# Patient Record
Sex: FEMALE | Race: WHITE | Employment: UNEMPLOYED | URBAN - METROPOLITAN AREA
[De-identification: names, ages, dates, MRNs, and addresses within clinical notes are randomized per-mention and may not be internally consistent; named-entity substitution may affect disease eponyms.]

---

## 2021-01-07 ENCOUNTER — OFFICE VISIT (OUTPATIENT)
Dept: FAMILY MEDICINE CLINIC | Age: 71
End: 2021-01-07
Payer: MEDICARE

## 2021-01-07 VITALS
BODY MASS INDEX: 27.49 KG/M2 | SYSTOLIC BLOOD PRESSURE: 128 MMHG | HEIGHT: 65 IN | DIASTOLIC BLOOD PRESSURE: 78 MMHG | HEART RATE: 106 BPM | WEIGHT: 165 LBS | OXYGEN SATURATION: 98 % | TEMPERATURE: 97.6 F

## 2021-01-07 DIAGNOSIS — R63.4 UNINTENDED WEIGHT LOSS: ICD-10-CM

## 2021-01-07 DIAGNOSIS — R10.30 LOWER ABDOMINAL PAIN: Primary | ICD-10-CM

## 2021-01-07 DIAGNOSIS — K14.3 BROWN HAIRY TONGUE: ICD-10-CM

## 2021-01-07 DIAGNOSIS — R63.0 DECREASED APPETITE: ICD-10-CM

## 2021-01-07 PROCEDURE — 1123F ACP DISCUSS/DSCN MKR DOCD: CPT | Performed by: NURSE PRACTITIONER

## 2021-01-07 PROCEDURE — G8427 DOCREV CUR MEDS BY ELIG CLIN: HCPCS | Performed by: NURSE PRACTITIONER

## 2021-01-07 PROCEDURE — 1036F TOBACCO NON-USER: CPT | Performed by: NURSE PRACTITIONER

## 2021-01-07 PROCEDURE — 1090F PRES/ABSN URINE INCON ASSESS: CPT | Performed by: NURSE PRACTITIONER

## 2021-01-07 PROCEDURE — 4040F PNEUMOC VAC/ADMIN/RCVD: CPT | Performed by: NURSE PRACTITIONER

## 2021-01-07 PROCEDURE — 3017F COLORECTAL CA SCREEN DOC REV: CPT | Performed by: NURSE PRACTITIONER

## 2021-01-07 PROCEDURE — G8484 FLU IMMUNIZE NO ADMIN: HCPCS | Performed by: NURSE PRACTITIONER

## 2021-01-07 PROCEDURE — G8400 PT W/DXA NO RESULTS DOC: HCPCS | Performed by: NURSE PRACTITIONER

## 2021-01-07 PROCEDURE — 99204 OFFICE O/P NEW MOD 45 MIN: CPT | Performed by: NURSE PRACTITIONER

## 2021-01-07 PROCEDURE — G8417 CALC BMI ABV UP PARAM F/U: HCPCS | Performed by: NURSE PRACTITIONER

## 2021-01-07 RX ORDER — IBUPROFEN 200 MG
200 TABLET ORAL DAILY PRN
COMMUNITY
End: 2021-01-14

## 2021-01-07 RX ORDER — MULTIVIT WITH MINERALS/LUTEIN
500 TABLET ORAL DAILY
COMMUNITY

## 2021-01-07 RX ORDER — CYANOCOBALAMIN (VITAMIN B-12) 1000 MCG
2 TABLET, EXTENDED RELEASE ORAL DAILY
Status: ON HOLD | COMMUNITY
End: 2021-02-18 | Stop reason: HOSPADM

## 2021-01-07 SDOH — ECONOMIC STABILITY: TRANSPORTATION INSECURITY
IN THE PAST 12 MONTHS, HAS LACK OF TRANSPORTATION KEPT YOU FROM MEETINGS, WORK, OR FROM GETTING THINGS NEEDED FOR DAILY LIVING?: NO

## 2021-01-07 SDOH — ECONOMIC STABILITY: TRANSPORTATION INSECURITY
IN THE PAST 12 MONTHS, HAS THE LACK OF TRANSPORTATION KEPT YOU FROM MEDICAL APPOINTMENTS OR FROM GETTING MEDICATIONS?: NO

## 2021-01-07 SDOH — ECONOMIC STABILITY: INCOME INSECURITY: HOW HARD IS IT FOR YOU TO PAY FOR THE VERY BASICS LIKE FOOD, HOUSING, MEDICAL CARE, AND HEATING?: NOT ASKED

## 2021-01-07 ASSESSMENT — PATIENT HEALTH QUESTIONNAIRE - PHQ9
2. FEELING DOWN, DEPRESSED OR HOPELESS: 0
1. LITTLE INTEREST OR PLEASURE IN DOING THINGS: 0
SUM OF ALL RESPONSES TO PHQ QUESTIONS 1-9: 0

## 2021-01-07 ASSESSMENT — ENCOUNTER SYMPTOMS
NAUSEA: 0
VOMITING: 0
CONSTIPATION: 0
ABDOMINAL PAIN: 1
DIARRHEA: 0

## 2021-01-07 NOTE — PATIENT INSTRUCTIONS
Patient Education        Abdominal Pain: Care Instructions  Your Care Instructions     Abdominal pain has many possible causes. Some aren't serious and get better on their own in a few days. Others need more testing and treatment. If your pain continues or gets worse, you need to be rechecked and may need more tests to find out what is wrong. You may need surgery to correct the problem. Don't ignore new symptoms, such as fever, nausea and vomiting, urination problems, pain that gets worse, and dizziness. These may be signs of a more serious problem. Your doctor may have recommended a follow-up visit in the next 8 to 12 hours. If you are not getting better, you may need more tests or treatment. The doctor has checked you carefully, but problems can develop later. If you notice any problems or new symptoms, get medical treatment right away. Follow-up care is a key part of your treatment and safety. Be sure to make and go to all appointments, and call your doctor if you are having problems. It's also a good idea to know your test results and keep a list of the medicines you take. How can you care for yourself at home? · Rest until you feel better. · To prevent dehydration, drink plenty of fluids, enough so that your urine is light yellow or clear like water. Choose water and other caffeine-free clear liquids until you feel better. If you have kidney, heart, or liver disease and have to limit fluids, talk with your doctor before you increase the amount of fluids you drink. · If your stomach is upset, eat mild foods, such as rice, dry toast or crackers, bananas, and applesauce. Try eating several small meals instead of two or three large ones. · Wait until 48 hours after all symptoms have gone away before you have spicy foods, alcohol, and drinks that contain caffeine. · Do not eat foods that are high in fat. · Avoid anti-inflammatory medicines such as aspirin, ibuprofen (Advil, Motrin), and naproxen (Aleve). These can cause stomach upset. Talk to your doctor if you take daily aspirin for another health problem. When should you call for help? Call 911 anytime you think you may need emergency care. For example, call if:    · You passed out (lost consciousness).     · You pass maroon or very bloody stools.     · You vomit blood or what looks like coffee grounds.     · You have new, severe belly pain. Call your doctor now or seek immediate medical care if:    · Your pain gets worse, especially if it becomes focused in one area of your belly.     · You have a new or higher fever.     · Your stools are black and look like tar, or they have streaks of blood.     · You have unexpected vaginal bleeding.     · You have symptoms of a urinary tract infection. These may include:  ? Pain when you urinate. ? Urinating more often than usual.  ? Blood in your urine.     · You are dizzy or lightheaded, or you feel like you may faint. Watch closely for changes in your health, and be sure to contact your doctor if:    · You are not getting better after 1 day (24 hours). Where can you learn more? Go to https://WeDemand.[employer]. org and sign in to your Ezakus account. Enter D466 in the UpNext box to learn more about \"Abdominal Pain: Care Instructions. \"     If you do not have an account, please click on the \"Sign Up Now\" link. Current as of: June 26, 2019               Content Version: 12.6  © 9355-1579 [employer], [employer]. Care instructions adapted under license by [employer]. If you have questions about a medical condition or this instruction, always ask your healthcare professional. [place] [address] any warranty or liability for your use of this information.

## 2021-01-07 NOTE — PROGRESS NOTES
 Smokeless tobacco: Never Used   Substance and Sexual Activity    Alcohol use: Yes     Comment: occasional- socially    Drug use: Not on file    Sexual activity: Yes   Lifestyle    Physical activity     Days per week: Not on file     Minutes per session: Not on file    Stress: Not on file   Relationships    Social connections     Talks on phone: Not on file     Gets together: Not on file     Attends Caodaism service: Not on file     Active member of club or organization: Not on file     Attends meetings of clubs or organizations: Not on file     Relationship status: Not on file    Intimate partner violence     Fear of current or ex partner: Not on file     Emotionally abused: Not on file     Physically abused: Not on file     Forced sexual activity: Not on file   Other Topics Concern    Not on file   Social History Narrative    Not on file     Family History   Problem Relation Age of Onset    Breast Cancer Mother     Heart Disease Father     Atrial Fibrillation Brother      Allergies   Allergen Reactions    Aspirin Other (See Comments)     GI issue     Current Outpatient Medications   Medication Sig Dispense Refill    ibuprofen (ADVIL;MOTRIN) 200 MG tablet Take 200 mg by mouth daily as needed for Pain      calcium citrate-vitamin D (CITRICAL + D) 315-250 MG-UNIT TABS per tablet Take 2 tablets by mouth daily      Ascorbic Acid (VITAMIN C) 250 MG tablet Take 500 mg by mouth daily       No current facility-administered medications for this visit. Review of Systems   Constitutional: Positive for appetite change and unexpected weight change. Negative for chills, fatigue and fever. HENT: Negative for congestion, rhinorrhea, sinus pressure, sinus pain and sore throat. Respiratory: Negative for cough, shortness of breath and wheezing. Gastrointestinal: Positive for abdominal pain. Negative for constipation, diarrhea, nausea and vomiting. Genitourinary: Negative for difficulty urinating and pelvic pain. Musculoskeletal: Negative for myalgias. Skin: Negative for rash. Neurological: Negative for dizziness, light-headedness and headaches. Psychiatric/Behavioral: Negative for agitation, confusion and decreased concentration. The patient is not nervous/anxious. Objective:   /78 (Site: Right Upper Arm, Position: Sitting, Cuff Size: Large Adult)   Pulse 106   Temp 97.6 °F (36.4 °C) (Temporal)   Ht 5' 5\" (1.651 m)   Wt 165 lb (74.8 kg)   SpO2 98%   BMI 27.46 kg/m²     Physical Exam  Vitals signs reviewed. Constitutional:       General: She is not in acute distress. Appearance: Normal appearance. She is normal weight. She is not ill-appearing. HENT:      Head: Normocephalic and atraumatic. Right Ear: Hearing, ear canal and external ear normal. No middle ear effusion. No foreign body. Tympanic membrane is not injected, erythematous or bulging. Left Ear: Hearing, ear canal and external ear normal.  No middle ear effusion. No foreign body. Tympanic membrane is not injected, erythematous or bulging. Nose: Nose normal. No congestion or rhinorrhea. Right Nostril: No foreign body. Left Nostril: No foreign body. Right Turbinates: Not enlarged. Left Turbinates: Not enlarged. Right Sinus: No maxillary sinus tenderness or frontal sinus tenderness. Left Sinus: No maxillary sinus tenderness or frontal sinus tenderness. Mouth/Throat:      Lips: Pink. Mouth: Mucous membranes are moist.      Pharynx: Uvula midline. No pharyngeal swelling, oropharyngeal exudate, posterior oropharyngeal erythema or uvula swelling. Tonsils: No tonsillar exudate or tonsillar abscesses. Comments: She has a brown coat on her tongue, worse on the left side   Eyes:      General: Lids are normal.         Right eye: No foreign body. Left eye: No foreign body. Extraocular Movements: Extraocular movements intact. Conjunctiva/sclera: Conjunctivae normal.   Neck:      Musculoskeletal: Normal range of motion. Cardiovascular:      Rate and Rhythm: Normal rate and regular rhythm. Pulses: Normal pulses. Heart sounds: Normal heart sounds, S1 normal and S2 normal. No murmur. Comments: Apical  HR 92  Pulmonary:      Effort: Pulmonary effort is normal. No tachypnea, accessory muscle usage or respiratory distress. Breath sounds: Normal breath sounds. No wheezing or rhonchi. Abdominal:      General: Abdomen is flat. Bowel sounds are normal.      Palpations: Abdomen is soft. There is no mass. Tenderness: There is no abdominal tenderness. There is no guarding. Negative signs include Lopez's sign and McBurney's sign. Comments: There is no pain at this time of exam even on palpation    Musculoskeletal: Normal range of motion. Right lower leg: No edema. Left lower leg: No edema. Lymphadenopathy:      Cervical: No cervical adenopathy. Upper Body:      Right upper body: No supraclavicular adenopathy. Left upper body: No supraclavicular adenopathy. Skin:     General: Skin is warm and dry. Capillary Refill: Capillary refill takes less than 2 seconds. Neurological:      General: No focal deficit present. Mental Status: She is alert and oriented to person, place, and time. Cranial Nerves: Cranial nerves are intact. Gait: Gait is intact. Psychiatric:         Attention and Perception: Attention normal.         Mood and Affect: Mood normal.         Speech: Speech normal.         Behavior: Behavior normal. Behavior is cooperative. Thought Content: Thought content normal.         Judgment: Judgment normal.         Assessment:       Diagnosis Orders   1. Lower abdominal pain  XR ABDOMEN (2 VIEWS)   2. Decreased appetite     3. Unintended weight loss     4.  Brown hairy tongue No results found for this visit on 01/07/21. Plan:   Encouraged her to switch to tylenol instead of Motrin for now. Encouraged her to brush her tongue when she brushes her teeth and to use mouth wash. Explained the importance of establishing care to catch up on routine screenings and dig deeper into this. Her abd Xray came back that she does have moderate stool. She does admit to having hard stools sometimes, she doesn't go everyday. Discussed trying miralax daily to see if she feels any improvement. Then titrate to where she is having good BMs but not loose. She is going to follow up with PCP. Assessment & Plan   Gustavo Franco was seen today for abdominal pain and anorexia. Diagnoses and all orders for this visit:    Lower abdominal pain  -     XR ABDOMEN (2 VIEWS); Future    Decreased appetite    Unintended weight loss    Brown hairy tongue      Orders Placed This Encounter   Procedures    XR ABDOMEN (2 VIEWS)     Standing Status:   Future     Number of Occurrences:   1     Standing Expiration Date:   1/7/2022     Order Specific Question:   Reason for exam:     Answer:   abd pain for one month     No orders of the defined types were placed in this encounter. There are no discontinued medications. Return for Follow up with PCP-establish care ASAP. Reviewed with the patient/family: current clinical status & medications. Side effects of the medication prescribed today, as well as treatment plan/rationale and result expectations have been discussed with the patient/family who expresses understanding. Patient will be discharged home in stable condition. Follow up with PCP to evaluate treatment results or return if symptoms worsen or fail to improve. Discussed signs and symptoms which require immediate follow-up in ED/call to 911. Understanding verbalized. I have reviewed the patient's medical history in detail and updated the computerized patient record.     Vanita García, APRN - CNP

## 2021-01-10 ASSESSMENT — ENCOUNTER SYMPTOMS
SINUS PRESSURE: 0
SORE THROAT: 0
COUGH: 0
WHEEZING: 0
SHORTNESS OF BREATH: 0
RHINORRHEA: 0
SINUS PAIN: 0

## 2021-01-14 ENCOUNTER — OFFICE VISIT (OUTPATIENT)
Dept: FAMILY MEDICINE CLINIC | Age: 71
End: 2021-01-14
Payer: MEDICARE

## 2021-01-14 VITALS
DIASTOLIC BLOOD PRESSURE: 78 MMHG | SYSTOLIC BLOOD PRESSURE: 130 MMHG | BODY MASS INDEX: 26.66 KG/M2 | RESPIRATION RATE: 12 BRPM | OXYGEN SATURATION: 98 % | WEIGHT: 160 LBS | HEART RATE: 111 BPM | TEMPERATURE: 97.4 F | HEIGHT: 65 IN

## 2021-01-14 DIAGNOSIS — Z12.31 ENCOUNTER FOR SCREENING MAMMOGRAM FOR MALIGNANT NEOPLASM OF BREAST: ICD-10-CM

## 2021-01-14 DIAGNOSIS — Z11.59 NEED FOR HEPATITIS C SCREENING TEST: ICD-10-CM

## 2021-01-14 DIAGNOSIS — Z13.220 SCREENING, LIPID: ICD-10-CM

## 2021-01-14 DIAGNOSIS — R10.9 ABDOMINAL PAIN, UNSPECIFIED ABDOMINAL LOCATION: ICD-10-CM

## 2021-01-14 DIAGNOSIS — R10.9 ABDOMINAL PAIN, UNSPECIFIED ABDOMINAL LOCATION: Primary | ICD-10-CM

## 2021-01-14 DIAGNOSIS — Z12.11 COLON CANCER SCREENING: ICD-10-CM

## 2021-01-14 LAB
ALBUMIN SERPL-MCNC: 4.1 G/DL (ref 3.5–4.6)
ALP BLD-CCNC: 112 U/L (ref 40–130)
ALT SERPL-CCNC: 15 U/L (ref 0–33)
ANION GAP SERPL CALCULATED.3IONS-SCNC: 21 MEQ/L (ref 9–15)
AST SERPL-CCNC: 18 U/L (ref 0–35)
BILIRUB SERPL-MCNC: 0.6 MG/DL (ref 0.2–0.7)
BUN BLDV-MCNC: 17 MG/DL (ref 8–23)
CALCIUM SERPL-MCNC: 9.8 MG/DL (ref 8.5–9.9)
CHLORIDE BLD-SCNC: 97 MEQ/L (ref 95–107)
CHOLESTEROL, FASTING: 157 MG/DL (ref 0–199)
CO2: 25 MEQ/L (ref 20–31)
CREAT SERPL-MCNC: 0.72 MG/DL (ref 0.5–0.9)
GFR AFRICAN AMERICAN: >60
GFR NON-AFRICAN AMERICAN: >60
GLOBULIN: 3.3 G/DL (ref 2.3–3.5)
GLUCOSE BLD-MCNC: 126 MG/DL (ref 70–99)
HDLC SERPL-MCNC: 45 MG/DL (ref 40–59)
HEPATITIS C ANTIBODY INTERPRETATION: NORMAL
LDL CHOLESTEROL CALCULATED: 87 MG/DL (ref 0–129)
POTASSIUM SERPL-SCNC: 3.5 MEQ/L (ref 3.4–4.9)
SODIUM BLD-SCNC: 143 MEQ/L (ref 135–144)
TOTAL PROTEIN: 7.4 G/DL (ref 6.3–8)
TRIGLYCERIDE, FASTING: 123 MG/DL (ref 0–150)

## 2021-01-14 PROCEDURE — 1036F TOBACCO NON-USER: CPT | Performed by: FAMILY MEDICINE

## 2021-01-14 PROCEDURE — G8427 DOCREV CUR MEDS BY ELIG CLIN: HCPCS | Performed by: FAMILY MEDICINE

## 2021-01-14 PROCEDURE — G8484 FLU IMMUNIZE NO ADMIN: HCPCS | Performed by: FAMILY MEDICINE

## 2021-01-14 PROCEDURE — 1123F ACP DISCUSS/DSCN MKR DOCD: CPT | Performed by: FAMILY MEDICINE

## 2021-01-14 PROCEDURE — 3017F COLORECTAL CA SCREEN DOC REV: CPT | Performed by: FAMILY MEDICINE

## 2021-01-14 PROCEDURE — 4040F PNEUMOC VAC/ADMIN/RCVD: CPT | Performed by: FAMILY MEDICINE

## 2021-01-14 PROCEDURE — G8400 PT W/DXA NO RESULTS DOC: HCPCS | Performed by: FAMILY MEDICINE

## 2021-01-14 PROCEDURE — G8417 CALC BMI ABV UP PARAM F/U: HCPCS | Performed by: FAMILY MEDICINE

## 2021-01-14 PROCEDURE — 1090F PRES/ABSN URINE INCON ASSESS: CPT | Performed by: FAMILY MEDICINE

## 2021-01-14 PROCEDURE — 99203 OFFICE O/P NEW LOW 30 MIN: CPT | Performed by: FAMILY MEDICINE

## 2021-01-14 NOTE — PROGRESS NOTES
General appearance - alert, well appearing, and in no distress  Mental Status - alert, oriented to person, place, and time  Eyes - pupils equal and reactive, extraocular eye movements intact   Ears - bilateral TM's and external ear canals normal   Nose - normal and patent, no erythema, discharge or polyps   Sinuses - Normal paranasal sinuses without tenderness   Throat - mucous membranes moist, pharynx normal without lesions   Neck - supple, no significant adenopathy   Thyroid - thyroid is normal in size without nodules or tenderness    Chest - clear to auscultation, no wheezes, rales or rhonchi, symmetric air entry   Heart - normal rate, regular rhythm, normal S1, S2, no murmurs, rubs, clicks or gallops  Abdomen - soft, nontender, nondistended, no masses or organomegaly. Bowel sounds are active  Back exam - full range of motion, no tenderness, palpable spasm or pain on motion   Neurological - alert, oriented, normal speech, no focal findings or movement disorder noted   Musculoskeletal - no joint tenderness, deformity or swelling   Extremities - peripheral pulses normal, no pedal edema, no clubbing or cyanosis   Skin - normal coloration and turgor, no rashes, no suspicious skin lesions noted    Labs   No results found for: TSHREFLEX  No results found for: TSH        A/P: Clementina Lick 69 y.o. female presenting for     1. Abdominal pain, unspecified abdominal location  Resolved. Still having decreased appetite. Will monitor   - Comprehensive Metabolic Panel; Future    2. Encounter for screening mammogram for malignant neoplasm of breast    - JUANA DIGITAL SCREEN W OR WO CAD BILATERAL; Future    3. Screening, lipid    - Lipid, Fasting; Future    4. Need for hepatitis C screening test    - Hepatitis C Antibody; Future    5. Colon cancer screening    - Cologuard;  Future          Please note, this report has been partially produced using speech recognition software and may cause  and /or contain errors related to that system including grammar, punctuation and spelling as well as words and phrases that may seem inappropriate. If there are questions or concerns please feel free to contact me to clarify.

## 2021-01-14 NOTE — PATIENT INSTRUCTIONS
Patient Education        Panic Attacks: Care Instructions  Your Care Instructions     During a panic attack, you may have a feeling of intense fear or terror, trouble breathing, chest pain or tightness, heartbeat changes, dizziness, sweating, and shaking. A panic attack starts suddenly and usually lasts from 5 to 20 minutes but may last even longer. You have the most anxiety about 10 minutes after the attack starts. An attack can begin with a stressful event, or it can happen without a cause. Although panic attacks can cause scary symptoms, you can learn to manage them with self-care, counseling, and medicine. Follow-up care is a key part of your treatment and safety. Be sure to make and go to all appointments, and call your doctor if you are having problems. It's also a good idea to know your test results and keep a list of the medicines you take. How can you care for yourself at home? · Take your medicine exactly as directed. Call your doctor if you think you are having a problem with your medicine. · Go to your counseling sessions and follow-up appointments. · Recognize and accept your anxiety. Then, when you are in a situation that makes you anxious, say to yourself, \"This is not an emergency. I feel uncomfortable, but I am not in danger. I can keep going even if I feel anxious. \"  · Be kind to your body:  ? Relieve tension with exercise or a massage. ? Get enough rest.  ? Avoid alcohol, caffeine, nicotine, and illegal drugs. They can increase your anxiety level, cause sleep problems, or trigger a panic attack. ? Learn and do relaxation techniques. See below for more about these techniques. · Engage your mind. Get out and do something you enjoy. Go to a funny movie, or take a walk or hike. Plan your day. Having too much or too little to do can make you anxious. · Keep a record of your symptoms. Discuss your fears with a good friend or family member, or join a support group for people with similar problems. Talking to others sometimes relieves stress. · Get involved in social groups, or volunteer to help others. Being alone sometimes makes things seem worse than they are. · Get at least 30 minutes of exercise on most days of the week to relieve stress. Walking is a good choice. You also may want to do other activities, such as running, swimming, cycling, or playing tennis or team sports. Relaxation techniques  Do relaxation exercises for 10 to 20 minutes a day. You can play soothing, relaxing music while you do them, if you wish. · Tell others in your house that you are going to do your relaxation exercises. Ask them not to disturb you. · Find a comfortable place, away from all distractions and noise. · Lie down on your back, or sit with your back straight. · Focus on your breathing. Make it slow and steady. · Breathe in through your nose. Breathe out through either your nose or mouth. · Breathe deeply, filling up the area between your navel and your rib cage. Breathe so that your belly goes up and down. · Do not hold your breath. · Breathe like this for 5 to 10 minutes. Notice the feeling of calmness throughout your whole body. As you continue to breathe slowly and deeply, relax by doing the following for another 5 to 10 minutes:  · Tighten and relax each muscle group in your body. You can begin at your toes and work your way up to your head. · Imagine your muscle groups relaxing and becoming heavy. · Empty your mind of all thoughts. · Let yourself relax more and more deeply. · Become aware of the state of calmness that surrounds you. · When your relaxation time is over, you can bring yourself back to alertness by moving your fingers and toes and then your hands and feet and then stretching and moving your entire body. Sometimes people fall asleep during relaxation, but they usually wake up shortly afterward. · Always give yourself time to return to full alertness before you drive a car or do anything that might cause an accident if you are not fully alert. Never play a relaxation tape while driving a car. When should you call for help? Call 911 anytime you think you may need emergency care. For example, call if:    · You feel you cannot stop from hurting yourself or someone else. Watch closely for changes in your health, and be sure to contact your doctor if:    · Your panic attacks get worse.     · You have new or different anxiety.     · You are not getting better as expected. Where can you learn more? Go to https://Embibe.Ethonova. org and sign in to your SignStorey account. Enter H601 in the PreViser box to learn more about \"Panic Attacks: Care Instructions. \"     If you do not have an account, please click on the \"Sign Up Now\" link. Current as of: January 31, 2020               Content Version: 12.6  © 2006-2020 [employer]. Care instructions adapted under license by [facility] ([facility]). If you have questions about a medical condition or this instruction, always ask your healthcare professional. [place] [address] any warranty or liability for your use of this information.

## 2021-01-19 ENCOUNTER — OFFICE VISIT (OUTPATIENT)
Dept: FAMILY MEDICINE CLINIC | Age: 71
End: 2021-01-19
Payer: MEDICARE

## 2021-01-19 VITALS
OXYGEN SATURATION: 97 % | SYSTOLIC BLOOD PRESSURE: 138 MMHG | WEIGHT: 160.6 LBS | HEIGHT: 65 IN | BODY MASS INDEX: 26.76 KG/M2 | DIASTOLIC BLOOD PRESSURE: 90 MMHG | TEMPERATURE: 97.4 F | HEART RATE: 120 BPM

## 2021-01-19 DIAGNOSIS — F41.9 ANXIETY: ICD-10-CM

## 2021-01-19 DIAGNOSIS — R63.0 DECREASED APPETITE: Primary | ICD-10-CM

## 2021-01-19 DIAGNOSIS — R10.30 LOWER ABDOMINAL PAIN: ICD-10-CM

## 2021-01-19 PROCEDURE — 4040F PNEUMOC VAC/ADMIN/RCVD: CPT | Performed by: FAMILY MEDICINE

## 2021-01-19 PROCEDURE — 1090F PRES/ABSN URINE INCON ASSESS: CPT | Performed by: FAMILY MEDICINE

## 2021-01-19 PROCEDURE — 1036F TOBACCO NON-USER: CPT | Performed by: FAMILY MEDICINE

## 2021-01-19 PROCEDURE — G8417 CALC BMI ABV UP PARAM F/U: HCPCS | Performed by: FAMILY MEDICINE

## 2021-01-19 PROCEDURE — 1123F ACP DISCUSS/DSCN MKR DOCD: CPT | Performed by: FAMILY MEDICINE

## 2021-01-19 PROCEDURE — G8400 PT W/DXA NO RESULTS DOC: HCPCS | Performed by: FAMILY MEDICINE

## 2021-01-19 PROCEDURE — G8484 FLU IMMUNIZE NO ADMIN: HCPCS | Performed by: FAMILY MEDICINE

## 2021-01-19 PROCEDURE — 99214 OFFICE O/P EST MOD 30 MIN: CPT | Performed by: FAMILY MEDICINE

## 2021-01-19 PROCEDURE — 3017F COLORECTAL CA SCREEN DOC REV: CPT | Performed by: FAMILY MEDICINE

## 2021-01-19 PROCEDURE — G8427 DOCREV CUR MEDS BY ELIG CLIN: HCPCS | Performed by: FAMILY MEDICINE

## 2021-01-19 RX ORDER — MIRTAZAPINE 7.5 MG/1
7.5 TABLET, FILM COATED ORAL NIGHTLY
Qty: 30 TABLET | Refills: 0 | Status: SHIPPED | OUTPATIENT
Start: 2021-01-19 | End: 2021-02-01

## 2021-01-19 ASSESSMENT — PATIENT HEALTH QUESTIONNAIRE - PHQ9
SUM OF ALL RESPONSES TO PHQ QUESTIONS 1-9: 1
2. FEELING DOWN, DEPRESSED OR HOPELESS: 1
1. LITTLE INTEREST OR PLEASURE IN DOING THINGS: 0
SUM OF ALL RESPONSES TO PHQ QUESTIONS 1-9: 1
SUM OF ALL RESPONSES TO PHQ9 QUESTIONS 1 & 2: 1
SUM OF ALL RESPONSES TO PHQ QUESTIONS 1-9: 1

## 2021-01-19 NOTE — PROGRESS NOTES
Chief Complaint   Patient presents with    Anorexia     States since she started with the abd pain about 2 weeks ago she has just not had an appetite. States her spouse will make her something & she will maybe take a couple of bites & other times she may not touch it. Denies any vomiting. States she will have like an acid reflux thing & will start coughing & will dry heave. Denies any SOB, chest pain or heart palpitations. Denies any constipation or diarrhea. Abdominal has gone, no longer having. HPI: Sivakumar Kevin 69 y.o. female presenting for    [facility] [place] care     Abdominal Pain   Patient was having lower abdominal pain. Denies any nausea or vomiting. Was associated with some constipation. Patient was given miralax and has been having regular bowel movement. Denies any blood in the stools. Patient currently has a soft diet. f/u  Denies having any more stomach. Abdominal pain is gone . Patient denies any changes in her abdomen. Patient has some ups and down. Denies any nausea or constipation. Patient reports that with her anxiety she gets acid reflux. pateint was taking tums  And it made it worse. Patient has a fear of eating at this time. Has fear that it will reproduce her pain. Panic Attacks   Admits still having panic attacks   Patinet reports with crowds it increases. Current Outpatient Medications   Medication Sig Dispense Refill    mirtazapine (REMERON) 7.5 MG tablet Take 1 tablet by mouth nightly 30 tablet 0    calcium citrate-vitamin D (CITRICAL + D) 315-250 MG-UNIT TABS per tablet Take 2 tablets by mouth daily      Ascorbic Acid (VITAMIN C) 250 MG tablet Take 500 mg by mouth daily       No current facility-administered medications for this visit. ROS  CONSTITUTIONAL: The patient denies fevers, chills, sweats and body ache. HEENT: Denies headache, blurry vision, eye pain, tinnitus, vertigo,  sore throat, neck or thyroid masses. RESPIRATORY: Denies cough, sputum, hemoptysis. CARDIAC: Denies chest pain, pressure, palpitations, Denies lower extremity edema. GASTROINTESTINAL: Denies abdominal pain (resolved), constipation, diarrhea, bleeding in the stools,   GENITOURINARY: Denies dysuria, hematuria, nocturia or frequency, urinary incontinence. NEUROLOGIC: Denies headaches, dizziness, syncope, weakness  MUSCULOSKELETAL: denies changes in range of motion, joint pain, stiffness. ENDOCRINOLOGY: Denies heat or cold intolerance. HEMATOLOGY: Denies easy bleeding or blood transfusion,anemia  DERMATOLOGY: Denies changes in moles or pigmentation changes. PSYCHIATRY: Denies depression, agitation or anxiety.     Past Medical History:   Diagnosis Date    Anxiety     panic attacks    Fibroids 1998    has complete hysterctomy    Kidney stone     14 years ago        Past Surgical History:   Procedure Laterality Date    HYSTERECTOMY, TOTAL ABDOMINAL          Family History   Problem Relation Age of Onset    Breast Cancer Mother     Heart Disease Father     Atrial Fibrillation Brother         Social History     Socioeconomic History    Marital status:      Spouse name: Not on file    Number of children: Not on file    Years of education: Not on file    Highest education level: Not on file   Occupational History    Not on file   Social Needs    Financial resource strain: Not on file    Food insecurity     Worry: Never true     Inability: Never true    Transportation needs     Medical: No     Non-medical: No   Tobacco Use    Smoking status: Never Smoker    Smokeless tobacco: Never Used   Substance and Sexual Activity    Alcohol use: Yes     Comment: occasional- socially    Drug use: Never    Sexual activity: Yes   Lifestyle    Physical activity     Days per week: Not on file     Minutes per session: Not on file    Stress: Not on file   Relationships    Social connections     Talks on phone: Not on file Gets together: Not on file     Attends Buddhist service: Not on file     Active member of club or organization: Not on file     Attends meetings of clubs or organizations: Not on file     Relationship status: Not on file    Intimate partner violence     Fear of current or ex partner: Not on file     Emotionally abused: Not on file     Physically abused: Not on file     Forced sexual activity: Not on file   Other Topics Concern    Not on file   Social History Narrative    Retired- worked with  in law firm     Hobbies- Genology     3 [place]. [place] Others in [address] [facility] up in [address]. BP (!) 138/90   Pulse 120   Temp 97.4 °F (36.3 °C) (Oral)   Ht 5' 5\" (1.651 m)   Wt 160 lb 9.6 oz (72.8 kg)   SpO2 97%   BMI 26.73 kg/m²        Physical Exam:    General appearance - alert, well appearing, and in no distress  Mental Status - alert, oriented to person, place, and time  Eyes - pupils equal and reactive, extraocular eye movements intact   Ears - bilateral TM's and external ear canals normal   Nose - normal and patent, no erythema, discharge or polyps   Sinuses - Normal paranasal sinuses without tenderness   Throat - mucous membranes moist, pharynx normal without lesions   Neck - supple, no significant adenopathy   Thyroid - thyroid is normal in size without nodules or tenderness    Chest - clear to auscultation, no wheezes, rales or rhonchi, symmetric air entry   Heart - normal rate, regular rhythm, normal S1, S2, no murmurs, rubs, clicks or gallops  Abdomen - soft, nontender, nondistended, no masses or organomegaly.  Bowel sounds are active  Back exam - full range of motion, no tenderness, palpable spasm or pain on motion   Neurological - alert, oriented, normal speech, no focal findings or movement disorder noted   Musculoskeletal - no joint tenderness, deformity or swelling   Extremities - peripheral pulses normal, no pedal edema, no clubbing or cyanosis Skin - normal coloration and turgor, no rashes, no suspicious skin lesions noted    Labs   No results found for: TSHREFLEX  No results found for: TSH    Lab Results   Component Value Date     01/14/2021    K 3.5 01/14/2021    CL 97 01/14/2021    CO2 25 01/14/2021    BUN 17 01/14/2021    CREATININE 0.72 01/14/2021    GLUCOSE 126 (H) 01/14/2021    CALCIUM 9.8 01/14/2021    PROT 7.4 01/14/2021    LABALBU 4.1 01/14/2021    BILITOT 0.6 01/14/2021    ALKPHOS 112 01/14/2021    AST 18 01/14/2021    ALT 15 01/14/2021    LABGLOM >60.0 01/14/2021    GFRAA >60.0 01/14/2021    GLOB 3.3 01/14/2021       No results found for: WBC, HGB, HCT, MCV, PLT  No results found for: LABA1C  No results found for: EAG      A/P: Roel Quezada 69 y.o. female presenting for     1. Decreased appetite  Decreased appetite despite improvement in abdominal pain. I wonder if this is secondary to anxiety. Will start Remeron to see if this can help with her symptoms. We will continue to monitor and have patient follow-up in 2 weeks. 2. Lower abdominal pain      3. Anxiety  We will start Remeron to help with her symptoms. We will have patient follow-up in 2 weeks. Please note, this report has been partially produced using speech recognition software  and may cause  and /or contain errors related to that system including grammar, punctuation and spelling as well as words and phrases that may seem inappropriate. If there are questions or concerns please feel free to contact me to clarify.

## 2021-02-01 ENCOUNTER — VIRTUAL VISIT (OUTPATIENT)
Dept: FAMILY MEDICINE CLINIC | Age: 71
End: 2021-02-01
Payer: MEDICARE

## 2021-02-01 ENCOUNTER — HOSPITAL ENCOUNTER (INPATIENT)
Age: 71
LOS: 17 days | Discharge: HOSPICE | DRG: 981 | End: 2021-02-18
Attending: EMERGENCY MEDICINE | Admitting: INTERNAL MEDICINE
Payer: MEDICARE

## 2021-02-01 ENCOUNTER — APPOINTMENT (OUTPATIENT)
Dept: CT IMAGING | Age: 71
DRG: 981 | End: 2021-02-01
Payer: MEDICARE

## 2021-02-01 DIAGNOSIS — E86.0 DEHYDRATION: ICD-10-CM

## 2021-02-01 DIAGNOSIS — R53.83 FATIGUE, UNSPECIFIED TYPE: Primary | ICD-10-CM

## 2021-02-01 DIAGNOSIS — R11.11 VOMITING WITHOUT NAUSEA, INTRACTABILITY OF VOMITING NOT SPECIFIED, UNSPECIFIED VOMITING TYPE: Primary | ICD-10-CM

## 2021-02-01 DIAGNOSIS — R19.8 GAGGING EPISODE: ICD-10-CM

## 2021-02-01 DIAGNOSIS — R18.0 MALIGNANT ASCITES: ICD-10-CM

## 2021-02-01 DIAGNOSIS — R11.2 INTRACTABLE VOMITING WITH NAUSEA, UNSPECIFIED VOMITING TYPE: ICD-10-CM

## 2021-02-01 LAB
ALBUMIN SERPL-MCNC: 3.8 G/DL (ref 3.5–4.6)
ALP BLD-CCNC: 193 U/L (ref 40–130)
ALT SERPL-CCNC: 49 U/L (ref 0–33)
ANION GAP SERPL CALCULATED.3IONS-SCNC: 16 MEQ/L (ref 9–15)
AST SERPL-CCNC: 53 U/L (ref 0–35)
BASOPHILS ABSOLUTE: 0 K/UL (ref 0–0.2)
BASOPHILS RELATIVE PERCENT: 0.8 %
BILIRUB SERPL-MCNC: 0.9 MG/DL (ref 0.2–0.7)
BUN BLDV-MCNC: 25 MG/DL (ref 8–23)
CALCIUM SERPL-MCNC: 9.6 MG/DL (ref 8.5–9.9)
CHLORIDE BLD-SCNC: 90 MEQ/L (ref 95–107)
CO2: 28 MEQ/L (ref 20–31)
CREAT SERPL-MCNC: 1.05 MG/DL (ref 0.5–0.9)
EOSINOPHILS ABSOLUTE: 0.1 K/UL (ref 0–0.7)
EOSINOPHILS RELATIVE PERCENT: 1 %
GFR AFRICAN AMERICAN: >60
GFR NON-AFRICAN AMERICAN: 51.5
GLOBULIN: 3.8 G/DL (ref 2.3–3.5)
GLUCOSE BLD-MCNC: 191 MG/DL (ref 70–99)
HCT VFR BLD CALC: 49.4 % (ref 37–47)
HEMOGLOBIN: 16.4 G/DL (ref 12–16)
INR BLD: 1
LACTIC ACID: 3.4 MMOL/L (ref 0.5–2.2)
LIPASE: 59 U/L (ref 12–95)
LYMPHOCYTES ABSOLUTE: 1.9 K/UL (ref 1–4.8)
LYMPHOCYTES RELATIVE PERCENT: 14 %
MCH RBC QN AUTO: 29.2 PG (ref 27–31.3)
MCHC RBC AUTO-ENTMCNC: 33.2 % (ref 33–37)
MCV RBC AUTO: 88.1 FL (ref 82–100)
METAMYELOCYTES RELATIVE PERCENT: 1 %
MONOCYTES ABSOLUTE: 1.4 K/UL (ref 0.2–0.8)
MONOCYTES RELATIVE PERCENT: 9.6 %
NEUTROPHILS ABSOLUTE: 10.4 K/UL (ref 1.4–6.5)
NEUTROPHILS RELATIVE PERCENT: 74 %
PDW BLD-RTO: 12.8 % (ref 11.5–14.5)
PLATELET # BLD: 469 K/UL (ref 130–400)
PLATELET SLIDE REVIEW: ABNORMAL
POC CREATININE WHOLE BLOOD: 1.1
POTASSIUM SERPL-SCNC: 5.5 MEQ/L (ref 3.4–4.9)
PROTHROMBIN TIME: 13.3 SEC (ref 12.3–14.9)
RBC # BLD: 5.61 M/UL (ref 4.2–5.4)
RBC # BLD: NORMAL 10*6/UL
SARS-COV-2, NAAT: NOT DETECTED
SODIUM BLD-SCNC: 134 MEQ/L (ref 135–144)
TOTAL PROTEIN: 7.6 G/DL (ref 6.3–8)
WBC # BLD: 13.9 K/UL (ref 4.8–10.8)

## 2021-02-01 PROCEDURE — 80053 COMPREHEN METABOLIC PANEL: CPT

## 2021-02-01 PROCEDURE — 36415 COLL VENOUS BLD VENIPUNCTURE: CPT

## 2021-02-01 PROCEDURE — 6360000004 HC RX CONTRAST MEDICATION: Performed by: EMERGENCY MEDICINE

## 2021-02-01 PROCEDURE — 2580000003 HC RX 258: Performed by: INTERNAL MEDICINE

## 2021-02-01 PROCEDURE — 85610 PROTHROMBIN TIME: CPT

## 2021-02-01 PROCEDURE — 6360000002 HC RX W HCPCS: Performed by: EMERGENCY MEDICINE

## 2021-02-01 PROCEDURE — 1210000000 HC MED SURG R&B

## 2021-02-01 PROCEDURE — 81003 URINALYSIS AUTO W/O SCOPE: CPT

## 2021-02-01 PROCEDURE — 83605 ASSAY OF LACTIC ACID: CPT

## 2021-02-01 PROCEDURE — 6360000002 HC RX W HCPCS: Performed by: INTERNAL MEDICINE

## 2021-02-01 PROCEDURE — 6370000000 HC RX 637 (ALT 250 FOR IP): Performed by: INTERNAL MEDICINE

## 2021-02-01 PROCEDURE — 2580000003 HC RX 258: Performed by: EMERGENCY MEDICINE

## 2021-02-01 PROCEDURE — 74177 CT ABD & PELVIS W/CONTRAST: CPT

## 2021-02-01 PROCEDURE — 83690 ASSAY OF LIPASE: CPT

## 2021-02-01 PROCEDURE — U0002 COVID-19 LAB TEST NON-CDC: HCPCS

## 2021-02-01 PROCEDURE — 85025 COMPLETE CBC W/AUTO DIFF WBC: CPT

## 2021-02-01 PROCEDURE — 96374 THER/PROPH/DIAG INJ IV PUSH: CPT

## 2021-02-01 PROCEDURE — 96361 HYDRATE IV INFUSION ADD-ON: CPT

## 2021-02-01 PROCEDURE — 99442 PR PHYS/QHP TELEPHONE EVALUATION 11-20 MIN: CPT | Performed by: FAMILY MEDICINE

## 2021-02-01 PROCEDURE — 99284 EMERGENCY DEPT VISIT MOD MDM: CPT

## 2021-02-01 RX ORDER — ONDANSETRON 2 MG/ML
4 INJECTION INTRAMUSCULAR; INTRAVENOUS ONCE
Status: COMPLETED | OUTPATIENT
Start: 2021-02-01 | End: 2021-02-01

## 2021-02-01 RX ORDER — 0.9 % SODIUM CHLORIDE 0.9 %
1000 INTRAVENOUS SOLUTION INTRAVENOUS ONCE
Status: COMPLETED | OUTPATIENT
Start: 2021-02-01 | End: 2021-02-01

## 2021-02-01 RX ORDER — PROMETHAZINE HYDROCHLORIDE 12.5 MG/1
12.5 TABLET ORAL EVERY 6 HOURS PRN
Status: DISCONTINUED | OUTPATIENT
Start: 2021-02-01 | End: 2021-02-18 | Stop reason: HOSPADM

## 2021-02-01 RX ORDER — ONDANSETRON 4 MG/1
4 TABLET, FILM COATED ORAL EVERY 12 HOURS PRN
Qty: 30 TABLET | Refills: 0 | Status: SHIPPED | OUTPATIENT
Start: 2021-02-01

## 2021-02-01 RX ORDER — SODIUM CHLORIDE 0.9 % (FLUSH) 0.9 %
10 SYRINGE (ML) INJECTION EVERY 12 HOURS SCHEDULED
Status: DISCONTINUED | OUTPATIENT
Start: 2021-02-01 | End: 2021-02-18 | Stop reason: HOSPADM

## 2021-02-01 RX ORDER — SENNA PLUS 8.6 MG/1
2 TABLET ORAL NIGHTLY
Status: DISCONTINUED | OUTPATIENT
Start: 2021-02-01 | End: 2021-02-18 | Stop reason: HOSPADM

## 2021-02-01 RX ORDER — ONDANSETRON 2 MG/ML
4 INJECTION INTRAMUSCULAR; INTRAVENOUS EVERY 6 HOURS PRN
Status: DISCONTINUED | OUTPATIENT
Start: 2021-02-01 | End: 2021-02-18 | Stop reason: HOSPADM

## 2021-02-01 RX ORDER — ACETAMINOPHEN 650 MG/1
650 SUPPOSITORY RECTAL EVERY 6 HOURS PRN
Status: DISCONTINUED | OUTPATIENT
Start: 2021-02-01 | End: 2021-02-18 | Stop reason: HOSPADM

## 2021-02-01 RX ORDER — ACETAMINOPHEN 325 MG/1
650 TABLET ORAL EVERY 6 HOURS PRN
Status: DISCONTINUED | OUTPATIENT
Start: 2021-02-01 | End: 2021-02-18 | Stop reason: HOSPADM

## 2021-02-01 RX ORDER — ONDANSETRON 4 MG/1
4 TABLET, FILM COATED ORAL EVERY 12 HOURS PRN
Qty: 30 TABLET | Refills: 0 | Status: SHIPPED | OUTPATIENT
Start: 2021-02-01 | End: 2021-02-01

## 2021-02-01 RX ORDER — SODIUM CHLORIDE 0.9 % (FLUSH) 0.9 %
10 SYRINGE (ML) INJECTION ONCE
Status: DISCONTINUED | OUTPATIENT
Start: 2021-02-01 | End: 2021-02-08 | Stop reason: HOSPADM

## 2021-02-01 RX ORDER — DOCUSATE SODIUM 100 MG/1
100 CAPSULE, LIQUID FILLED ORAL 2 TIMES DAILY
Status: DISCONTINUED | OUTPATIENT
Start: 2021-02-01 | End: 2021-02-09

## 2021-02-01 RX ORDER — SODIUM CHLORIDE 0.9 % (FLUSH) 0.9 %
10 SYRINGE (ML) INJECTION PRN
Status: DISCONTINUED | OUTPATIENT
Start: 2021-02-01 | End: 2021-02-18 | Stop reason: HOSPADM

## 2021-02-01 RX ORDER — BISACODYL 10 MG
10 SUPPOSITORY, RECTAL RECTAL DAILY
Status: DISCONTINUED | OUTPATIENT
Start: 2021-02-01 | End: 2021-02-09

## 2021-02-01 RX ADMIN — ENOXAPARIN SODIUM 40 MG: 40 INJECTION SUBCUTANEOUS at 20:53

## 2021-02-01 RX ADMIN — SENNOSIDES 17.2 MG: 8.6 TABLET, FILM COATED ORAL at 22:27

## 2021-02-01 RX ADMIN — Medication 10 ML: at 20:53

## 2021-02-01 RX ADMIN — ONDANSETRON 4 MG: 2 INJECTION INTRAMUSCULAR; INTRAVENOUS at 13:50

## 2021-02-01 RX ADMIN — IOPAMIDOL 100 ML: 612 INJECTION, SOLUTION INTRAVENOUS at 15:51

## 2021-02-01 RX ADMIN — SODIUM CHLORIDE 1000 ML: 9 INJECTION, SOLUTION INTRAVENOUS at 14:24

## 2021-02-01 RX ADMIN — DOCUSATE SODIUM 100 MG: 100 CAPSULE ORAL at 22:27

## 2021-02-01 RX ADMIN — SODIUM CHLORIDE 1000 ML: 9 INJECTION, SOLUTION INTRAVENOUS at 13:50

## 2021-02-01 RX ADMIN — ONDANSETRON 4 MG: 2 INJECTION INTRAMUSCULAR; INTRAVENOUS at 20:53

## 2021-02-01 ASSESSMENT — ENCOUNTER SYMPTOMS
NAUSEA: 1
ABDOMINAL PAIN: 0
CHEST TIGHTNESS: 0
ABDOMINAL PAIN: 0
STRIDOR: 0
EYE PAIN: 0
EYE ITCHING: 0
DIARRHEA: 0
SHORTNESS OF BREATH: 0
SORE THROAT: 0
APNEA: 0
VOMITING: 1
ABDOMINAL DISTENTION: 0
ABDOMINAL DISTENTION: 1
EYE DISCHARGE: 0
RHINORRHEA: 0
SHORTNESS OF BREATH: 0
SINUS PRESSURE: 0
CONSTIPATION: 1
CHEST TIGHTNESS: 0

## 2021-02-01 NOTE — ED PROVIDER NOTES
Except as noted above the remainder of the review of systems was reviewed and negative.        PAST MEDICAL HISTORY     Past Medical History:   Diagnosis Date    Anxiety     panic attacks    Fibroids 1998    has complete hysterctomy    Kidney stone     14 years ago         SURGICAL HISTORY       Past Surgical History:   Procedure Laterality Date    HYSTERECTOMY, TOTAL ABDOMINAL           CURRENT MEDICATIONS       Previous Medications    ASCORBIC ACID (VITAMIN C) 250 MG TABLET    Take 500 mg by mouth daily    CALCIUM CITRATE-VITAMIN D (CITRICAL + D) 315-250 MG-UNIT TABS PER TABLET    Take 2 tablets by mouth daily    ONDANSETRON (ZOFRAN) 4 MG TABLET    Take 1 tablet by mouth every 12 hours as needed for Nausea or Vomiting       ALLERGIES     Aspirin    FAMILY HISTORY       Family History   Problem Relation Age of Onset    Breast Cancer Mother     Heart Disease Father     Atrial Fibrillation Brother           SOCIAL HISTORY       Social History     Socioeconomic History    Marital status:      Spouse name: None    Number of children: None    Years of education: None    Highest education level: None   Occupational History    None   Social Needs    Financial resource strain: None    Food insecurity     Worry: Never true     Inability: Never true    Transportation needs     Medical: No     Non-medical: No   Tobacco Use    Smoking status: Never Smoker    Smokeless tobacco: Never Used   Substance and Sexual Activity    Alcohol use: Yes     Comment: occasional- socially    Drug use: Never    Sexual activity: Yes   Lifestyle    Physical activity     Days per week: None     Minutes per session: None    Stress: None   Relationships    Social connections     Talks on phone: None     Gets together: None     Attends Shinto service: None     Active member of club or organization: None     Attends meetings of clubs or organizations: None     Relationship status: None    Intimate partner violence Fear of current or ex partner: None     Emotionally abused: None     Physically abused: None     Forced sexual activity: None   Other Topics Concern    None   Social History Narrative    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 [place] in [place] Others in [address]. SCREENINGS        Vida Coma Scale  Eye Opening: Spontaneous  Best Verbal Response: Oriented  Best Motor Response: Obeys commands  Welcome Coma Scale Score: 15               PHYSICAL EXAM    (up to 7 for level 4, 8 or more for level 5)     ED Triage Vitals [02/01/21 1317]   BP Temp Temp src Pulse Resp SpO2 Height Weight   (!) 129/99 98.3 °F (36.8 °C) -- 119 19 98 % 5' 5\" (1.651 m) 160 lb (72.6 kg)       Physical Exam  Vitals signs and nursing note reviewed. Constitutional:       General: She is not in acute distress. Appearance: She is well-developed. She is not diaphoretic. HENT:      Head: Normocephalic and atraumatic. Right Ear: External ear normal.      Left Ear: External ear normal.      Mouth/Throat:      Pharynx: No oropharyngeal exudate. Eyes:      Conjunctiva/sclera: Conjunctivae normal.      Pupils: Pupils are equal, round, and reactive to light. Neck:      Musculoskeletal: Normal range of motion and neck supple. Thyroid: No thyromegaly. Vascular: No JVD. Trachea: No tracheal deviation. Cardiovascular:      Rate and Rhythm: Normal rate. Heart sounds: Normal heart sounds. No murmur. Pulmonary:      Effort: Pulmonary effort is normal. No respiratory distress. Breath sounds: Normal breath sounds. No wheezing. Abdominal:      General: Bowel sounds are normal. There is distension. Palpations: Abdomen is soft. Tenderness: There is no abdominal tenderness. There is no guarding. Comments: Abdomen firm and distended. Likely ascites. Musculoskeletal: Normal range of motion. Skin:     General: Skin is warm and dry. Coloration: Skin is pale. Findings: No rash. Neurological:      Mental Status: She is alert and oriented to person, place, and time. Cranial Nerves: No cranial nerve deficit. Psychiatric:         Behavior: Behavior normal.         DIAGNOSTIC RESULTS     EKG: All EKG's are interpreted by the Emergency Department Physician who either signs or Co-signs this chart in the absence of a cardiologist.        RADIOLOGY:   Non-plain film images such as CT, Ultrasound and MRI are read by the radiologist. Plain radiographic images are visualized and preliminarily interpreted by the emergency physician with the below findings:        Interpretation per the Radiologist below, if available at the time of this note:    CT ABDOMEN PELVIS W IV CONTRAST Additional Contrast? None    (Results Pending)     Thickening and nodularity of the omentum concerning for peritoneal metastasis or peritonitis. A large amount of ascites is present.       Two ill-defined lesions within the right lobe of the liver measuring 1 cm and 2 cm respectively are nonspecific but most concerning for metastatic lesions.       Mild perivesicular inflammation. Correlation with urinalysis recommended to exclude cystitis.       Large amount stool within the rectosigmoid colon may represent constipation and/or fecal impaction.       Subtle nodularity of the liver suggests cirrhosis.       Mild right-sided hydronephrosis without radiopaque calculus.       Debris within the gallbladder likely represents gallstones and/or gallbladder sludge.       Small right pleural effusion.            ED BEDSIDE ULTRASOUND:   Performed by ED Physician - none    LABS:  Labs Reviewed   CBC WITH AUTO DIFFERENTIAL - Abnormal; Notable for the following components:       Result Value    WBC 13.9 (*)     RBC 5.61 (*)     Hemoglobin 16.4 (*)     Hematocrit 49.4 (*)     Platelets 754 (*)     Neutrophils Absolute 10.4 (*)     Monocytes Absolute 1.4 (*) All other components within normal limits   COMPREHENSIVE METABOLIC PANEL - Abnormal; Notable for the following components:    Sodium 134 (*)     Potassium 5.5 (*)     Chloride 90 (*)     Anion Gap 16 (*)     Glucose 191 (*)     BUN 25 (*)     CREATININE 1.05 (*)     GFR Non- 51.5 (*)     Total Bilirubin 0.9 (*)     Alkaline Phosphatase 193 (*)     ALT 49 (*)     AST 53 (*)     Globulin 3.8 (*)     All other components within normal limits   LACTIC ACID, PLASMA - Abnormal; Notable for the following components:    Lactic Acid 3.4 (*)     All other components within normal limits    Narrative:     CALL  [facility]  [facility] tel. [phone],  Lactic Acid results called to and read back by Caleb HOGAN RN,  02/01/2021 14:22, by Joe Moreno 92 - Normal   LIPASE   PROTIME-INR   URINE RT REFLEX TO CULTURE       All other labs were within normal range or not returned as of this dictation. EMERGENCY DEPARTMENT COURSE and DIFFERENTIAL DIAGNOSIS/MDM:   Vitals:    Vitals:    02/01/21 1317   BP: (!) 129/99   Pulse: 119   Resp: 19   Temp: 98.3 °F (36.8 °C)   SpO2: 98%   Weight: 160 lb (72.6 kg)   Height: 5' 5\" (1.651 m)       Patient came in with intractable nausea and vomiting. Fatigue and weight loss. CT shows ascites. Possible nodularity of the omentum which could be consistent with peritoneal metastasis. Patient received 2 L of fluid and Zofran. Her nausea is gone. The patient is able to keep fluids down I believe she could be sent home and follow-up with interventional radiology. She will need a paracentesis and have the fluid tested. MDM      REASSESSMENT          CRITICAL CARE TIME   Total Critical Care time was 0 minutes, excluding separately reportable procedures. There was a high probability of clinically significant/life threatening deterioration in the patient's condition which required my urgent intervention.       CONSULTS:  None    PROCEDURES: Unless otherwise noted below, none     Procedures        FINAL IMPRESSION      1. Fatigue, unspecified type    2. Dehydration    3. Intractable vomiting with nausea, unspecified vomiting type    4. Malignant ascites          DISPOSITION/PLAN   DISPOSITION  Admit      PATIENT REFERRED TO:  No follow-up provider specified. DISCHARGE MEDICATIONS:  New Prescriptions    No medications on file     Controlled Substances Monitoring:     No flowsheet data found.     (Please note that portions of this note were completed with a voice recognition program.  Efforts were made to edit the dictations but occasionally words are mis-transcribed.)    Lizette Vasquez DO (electronically signed)  Attending Emergency Physician           Lizette Vasquez DO  02/01/21 [facility] 99, DO  02/01/21 9813

## 2021-02-01 NOTE — PROGRESS NOTES
2021    TELEHEALTH EVALUATION -- Audio/Visual (During OFUDP-86 public health emergency)    Due to COVID 19 outbreak, patient's office visit was converted to a virtual visit. Patient was contacted and agreed to proceed with a virtual visit via Telephone Visit  The risks and benefits of converting to a virtual visit were discussed in light of the current infectious disease epidemic. Patient also understood that insurance coverage and co-pays are up to their individual insurance plans. HPI:    Anthony Santiago (:  1950) has requested an audio/video evaluation for the following concern(s):    Decreased appetite, weight loss, nausea vomiting  Patient is coming here for follow-up of decreased appetite. As you may recall her symptoms were first associated with abdominal pain. Since then her abdominal pain has resolved but still has decreased appetite and which has now progressed to vomiting. Associated at times with nausea. Patient drinks about 10 to 16 ounces and most of the day. Per , patient's oral mucosa is dry. No change in urination. Patient had tried Prilosec to help with her acid reflux symptoms. Denies any blood in her stools or blood in her vomit. Patient is not been able to weigh herself. Patient denies any fevers, chills, vomiting, chest pain, shortness of breath, abdominal pain, urination, change in stools. Patient has been drinking Pedialyte but unable to tolerate any Ensure. Patient was placed on mirtazapine 2 weeks ago to help with her anxiety and to stimulate her appetite. Patient reports that it helps her to think about food but once food is placed in front of her she is unable to eat. Patient reports that her constipation is improved with taking MiraLAX. Review of Systems   Constitutional: Positive for fatigue. Negative for activity change, appetite change and fever. HENT: Negative for congestion, dental problem, hearing loss, mouth sores, rhinorrhea and sinus pressure. Eyes: Negative for discharge and itching. Respiratory: Negative for apnea, chest tightness, shortness of breath and stridor. Cardiovascular: Negative for chest pain and leg swelling. Gastrointestinal: Positive for constipation. Negative for abdominal distention, abdominal pain and diarrhea. Endocrine: Negative for cold intolerance. Genitourinary: Negative for difficulty urinating, dyspareunia, dysuria, enuresis and pelvic pain. Allergic/Immunologic: Negative for environmental allergies. Neurological: Negative for dizziness, seizures, facial asymmetry and numbness. Hematological: Negative for adenopathy. Does not bruise/bleed easily. Psychiatric/Behavioral: Negative for agitation, behavioral problems and self-injury. The patient is not nervous/anxious and is not hyperactive. Prior to Visit Medications    Medication Sig Taking?  Authorizing Provider   ondansetron (ZOFRAN) 4 MG tablet Take 1 tablet by mouth every 12 hours as needed for Nausea or Vomiting Yes Jesse Woodruff MD   calcium citrate-vitamin D (CITRICAL + D) 315-250 MG-UNIT TABS per tablet Take 2 tablets by mouth daily  Historical Provider, MD   Ascorbic Acid (VITAMIN C) 250 MG tablet Take 500 mg by mouth daily  Historical Provider, MD       Social History     Tobacco Use    Smoking status: Never Smoker    Smokeless tobacco: Never Used   Substance Use Topics    Alcohol use: Yes     Comment: occasional- socially    Drug use: Never        Allergies   Allergen Reactions    Aspirin Other (See Comments)     GI issue   ,   Past Medical History:   Diagnosis Date    Anxiety     panic attacks    Fibroids 1998    has complete hysterctomy    Kidney stone     14 years ago   ,   Past Surgical History:   Procedure Laterality Date    HYSTERECTOMY, TOTAL ABDOMINAL     ,   Social History     Tobacco Use  Smoking status: Never Smoker    Smokeless tobacco: Never Used   Substance Use Topics    Alcohol use: Yes     Comment: occasional- socially    Drug use: Never   ,   Family History   Problem Relation Age of Onset    Breast Cancer Mother     Heart Disease Father     Atrial Fibrillation Brother    ,   Immunization History   Administered Date(s) Administered    Influenza Virus Vaccine 09/10/2020   ,   Health Maintenance   Topic Date Due    COVID-19 Vaccine (1 of 2) 03/27/1965    DTaP/Tdap/Td vaccine (1 - Tdap) 03/27/1968    Breast cancer screen  03/27/1989    Shingles Vaccine (1 of 2) 03/27/1999    Colon cancer screen colonoscopy  03/27/1999    DEXA (modify frequency per FRAX score)  03/27/2004    Annual Wellness Visit (AWV)  01/07/2021    Pneumococcal 65+ years Vaccine (1 of 1 - PPSV23) 01/19/2022 (Originally 3/27/2014)    Lipid screen  01/14/2026    Flu vaccine  Completed    Hepatitis C screen  Completed    Hepatitis A vaccine  Aged Out    Hepatitis B vaccine  Aged Out    Hib vaccine  Aged Out    Meningococcal (ACWY) vaccine  Aged Out       PHYSICAL EXAMINATION:  [ INSTRUCTIONS:  \"[x]\" Indicates a positive item  \"[]\" Indicates a negative item  -- DELETE ALL ITEMS NOT EXAMINED]  [x] Alert  [x] Oriented to person/place/time    [x] No apparent distress  [] Toxic appearing    [] Face flushed appearing [] Sclera clear  [] Lips are cyanotic      [x] Breathing appears normal  [] Appears tachypneic      [] Rash on visible skin    [] Cranial Nerves II-XII grossly intact    [] Motor grossly intact in visible upper extremities    [] Motor grossly intact in visible lower extremities    [x] Normal Mood  [] Anxious appearing    [] Depressed appearing  [] Confused appearing      [] Poor short term memory  [] Poor long term memory    [] OTHER: Due to this being a TeleHealth encounter, evaluation of the following organ systems is limited: Vitals/Constitutional/EENT/Resp/CV/GI//MS/Neuro/Skin/Heme-Lymph-Imm. ASSESSMENT/PLAN:  1. Vomiting without nausea, intractability of vomiting not specified, unspecified vomiting type  Due to patient's clinical dehydration will likely need IV fluids to help with her symptoms. Unsure the etiology of her vomiting. Patient not tried acid reflux medication at this time could possibly be uncontrolled acid reflux. But given her history of weight loss and vomiting may need to have a scope. - [id number] [facility] [place]    2. Gagging episode    - [id number] [facility] [place]    3. Dehydration  Patient is clinically dehydrated. This is based off of the history from her and her   Will likely need IV fluids to help with her symptoms as she is unable to tolerate anything by mouth. No follow-ups on file. An  electronic signature was used to authenticate this note. --Radha Rivero MD on 2/1/2021 at 12:15 PM        Pursuant to the emergency declaration under the [facility], [facility] waiver authority and the [employer] and [employer] Act, this Virtual  Visit was conducted, with patient's consent, to reduce the patient's risk of exposure to COVID-19 and provide continuity of care for an established patient. Services were provided through a video synchronous discussion virtually to substitute for in-person clinic visit. Timmy Nieves is a 69 y.o. female evaluated via telephone on 2/1/2021. Consent:  She and/or health care decision maker is aware that that she may receive a bill for this telephone service, depending on her insurance coverage, and has provided verbal consent to proceed: Yes      Documentation:  I communicated with the patient and/or health care decision maker about decreased appetite and nausea. Details of this discussion including any medical advice provided:       I affirm this is a Patient Initiated Episode with a Patient who has not had a related appointment within my department in the past 7 days or scheduled within the next 24 hours.     Patient identification was verified at the start of the visit: Yes    Total Time: minutes: 11-20 minutes    Note: not billable if this call serves to triage the patient into an appointment for the relevant concern      Arlet Wisdom

## 2021-02-02 ENCOUNTER — APPOINTMENT (OUTPATIENT)
Dept: ULTRASOUND IMAGING | Age: 71
DRG: 981 | End: 2021-02-02
Payer: MEDICARE

## 2021-02-02 LAB
ALBUMIN FLUID: 2.4 G/DL
ALBUMIN SERPL-MCNC: 3.1 G/DL (ref 3.5–4.6)
ALP BLD-CCNC: 139 U/L (ref 40–130)
ALT SERPL-CCNC: 38 U/L (ref 0–33)
AMYLASE FLUID: 13 U/L
ANION GAP SERPL CALCULATED.3IONS-SCNC: 16 MEQ/L (ref 9–15)
APPEARANCE FLUID: NORMAL
AST SERPL-CCNC: 32 U/L (ref 0–35)
BASO FLUID: 1 %
BASOPHILS ABSOLUTE: 0.1 K/UL (ref 0–0.2)
BASOPHILS RELATIVE PERCENT: 0.9 %
BILIRUB SERPL-MCNC: 0.6 MG/DL (ref 0.2–0.7)
BILIRUBIN DIRECT: 0.3 MG/DL (ref 0–0.4)
BILIRUBIN URINE: ABNORMAL
BILIRUBIN, INDIRECT: 0.3 MG/DL (ref 0–0.6)
BLOOD, URINE: NEGATIVE
BUN BLDV-MCNC: 21 MG/DL (ref 8–23)
CA 125: 382.2 U/ML (ref 0–35)
CALCIUM SERPL-MCNC: 8.6 MG/DL (ref 8.5–9.9)
CEA: 7.6 NG/ML (ref 0–5.5)
CELL COUNT FLUID TYPE: NORMAL
CHLORIDE BLD-SCNC: 96 MEQ/L (ref 95–107)
CLARITY: CLEAR
CLOT EVALUATION: NORMAL
CO2: 22 MEQ/L (ref 20–31)
COLOR FLUID: YELLOW
COLOR: ABNORMAL
CREAT SERPL-MCNC: 0.8 MG/DL (ref 0.5–0.9)
EOSINOPHIL FLUID: 2 %
EOSINOPHILS ABSOLUTE: 0.1 K/UL (ref 0–0.7)
EOSINOPHILS RELATIVE PERCENT: 0.5 %
FLUID PATH CONSULT: YES
FLUID TYPE: NORMAL
GFR AFRICAN AMERICAN: 59
GFR AFRICAN AMERICAN: >60
GFR NON-AFRICAN AMERICAN: 49
GFR NON-AFRICAN AMERICAN: >60
GLUCOSE BLD-MCNC: 116 MG/DL (ref 70–99)
GLUCOSE URINE: NEGATIVE MG/DL
GLUCOSE, FLUID: 104 MG/DL
GRAM STAIN RESULT: NORMAL
HCT VFR BLD CALC: 41.7 % (ref 37–47)
HEMOGLOBIN: 14 G/DL (ref 12–16)
HEPATITIS B SURFACE ANTIGEN INTERPRETATION: NORMAL
KETONES, URINE: 15 MG/DL
LD, FLUID: 219 U/L
LEUKOCYTE ESTERASE, URINE: NEGATIVE
LYMPHOCYTES ABSOLUTE: 1.6 K/UL (ref 1–4.8)
LYMPHOCYTES RELATIVE PERCENT: 14.6 %
LYMPHOCYTES, BODY FLUID: 83 %
MCH RBC QN AUTO: 29.7 PG (ref 27–31.3)
MCHC RBC AUTO-ENTMCNC: 33.5 % (ref 33–37)
MCV RBC AUTO: 88.6 FL (ref 82–100)
MONOCYTE, FLUID: 9 %
MONOCYTES ABSOLUTE: 1.1 K/UL (ref 0.2–0.8)
MONOCYTES RELATIVE PERCENT: 9.7 %
NEUTROPHIL, FLUID: 5 %
NEUTROPHILS ABSOLUTE: 8.1 K/UL (ref 1.4–6.5)
NEUTROPHILS RELATIVE PERCENT: 74.3 %
NITRITE, URINE: NEGATIVE
NUCLEATED CELLS FLUID: 648 /CUMM
NUMBER OF CELLS COUNTED FLUID: 100
PDW BLD-RTO: 13.4 % (ref 11.5–14.5)
PERFORMED ON: ABNORMAL
PH UA: 5.5 (ref 5–9)
PLATELET # BLD: 393 K/UL (ref 130–400)
POC CREATININE: 1.1 MG/DL (ref 0.6–1.2)
POC SAMPLE TYPE: ABNORMAL
POTASSIUM REFLEX MAGNESIUM: 3.7 MEQ/L (ref 3.4–4.9)
PROTEIN FLUID: 4.1 G/DL
PROTEIN UA: ABNORMAL MG/DL
RBC # BLD: 4.71 M/UL (ref 4.2–5.4)
RBC FLUID: 2677 /CUMM
SODIUM BLD-SCNC: 134 MEQ/L (ref 135–144)
SPECIFIC GRAVITY UA: 1.06 (ref 1–1.03)
TOTAL PROTEIN: 5.9 G/DL (ref 6.3–8)
URINE REFLEX TO CULTURE: ABNORMAL
UROBILINOGEN, URINE: 1 E.U./DL
WBC # BLD: 10.9 K/UL (ref 4.8–10.8)

## 2021-02-02 PROCEDURE — 99223 1ST HOSP IP/OBS HIGH 75: CPT | Performed by: SPECIALIST

## 2021-02-02 PROCEDURE — 86376 MICROSOMAL ANTIBODY EACH: CPT

## 2021-02-02 PROCEDURE — 84157 ASSAY OF PROTEIN OTHER: CPT

## 2021-02-02 PROCEDURE — 6360000002 HC RX W HCPCS: Performed by: INTERNAL MEDICINE

## 2021-02-02 PROCEDURE — 86304 IMMUNOASSAY TUMOR CA 125: CPT

## 2021-02-02 PROCEDURE — 2709999900 US GUIDED PARACENTESIS

## 2021-02-02 PROCEDURE — 76705 ECHO EXAM OF ABDOMEN: CPT | Performed by: RADIOLOGY

## 2021-02-02 PROCEDURE — 89051 BODY FLUID CELL COUNT: CPT

## 2021-02-02 PROCEDURE — 76705 ECHO EXAM OF ABDOMEN: CPT

## 2021-02-02 PROCEDURE — 82150 ASSAY OF AMYLASE: CPT

## 2021-02-02 PROCEDURE — 87070 CULTURE OTHR SPECIMN AEROBIC: CPT

## 2021-02-02 PROCEDURE — 85025 COMPLETE CBC W/AUTO DIFF WBC: CPT

## 2021-02-02 PROCEDURE — 82378 CARCINOEMBRYONIC ANTIGEN: CPT

## 2021-02-02 PROCEDURE — 49083 ABD PARACENTESIS W/IMAGING: CPT | Performed by: RADIOLOGY

## 2021-02-02 PROCEDURE — 88342 IMHCHEM/IMCYTCHM 1ST ANTB: CPT

## 2021-02-02 PROCEDURE — 82042 OTHER SOURCE ALBUMIN QUAN EA: CPT

## 2021-02-02 PROCEDURE — 2500000003 HC RX 250 WO HCPCS: Performed by: RADIOLOGY

## 2021-02-02 PROCEDURE — 87205 SMEAR GRAM STAIN: CPT

## 2021-02-02 PROCEDURE — 83615 LACTATE (LD) (LDH) ENZYME: CPT

## 2021-02-02 PROCEDURE — 6370000000 HC RX 637 (ALT 250 FOR IP): Performed by: INTERNAL MEDICINE

## 2021-02-02 PROCEDURE — 88305 TISSUE EXAM BY PATHOLOGIST: CPT

## 2021-02-02 PROCEDURE — 99222 1ST HOSP IP/OBS MODERATE 55: CPT | Performed by: RADIOLOGY

## 2021-02-02 PROCEDURE — 88341 IMHCHEM/IMCYTCHM EA ADD ANTB: CPT

## 2021-02-02 PROCEDURE — 86301 IMMUNOASSAY TUMOR CA 19-9: CPT

## 2021-02-02 PROCEDURE — 80048 BASIC METABOLIC PNL TOTAL CA: CPT

## 2021-02-02 PROCEDURE — 1210000000 HC MED SURG R&B

## 2021-02-02 PROCEDURE — 83516 IMMUNOASSAY NONANTIBODY: CPT

## 2021-02-02 PROCEDURE — 88112 CYTOPATH CELL ENHANCE TECH: CPT

## 2021-02-02 PROCEDURE — 82945 GLUCOSE OTHER FLUID: CPT

## 2021-02-02 PROCEDURE — 36415 COLL VENOUS BLD VENIPUNCTURE: CPT

## 2021-02-02 PROCEDURE — 87340 HEPATITIS B SURFACE AG IA: CPT

## 2021-02-02 PROCEDURE — 80076 HEPATIC FUNCTION PANEL: CPT

## 2021-02-02 RX ORDER — LACTULOSE 10 G/15ML
30 SOLUTION ORAL ONCE
Status: COMPLETED | OUTPATIENT
Start: 2021-02-02 | End: 2021-02-02

## 2021-02-02 RX ORDER — LIDOCAINE HYDROCHLORIDE 20 MG/ML
INJECTION, SOLUTION INFILTRATION; PERINEURAL
Status: COMPLETED | OUTPATIENT
Start: 2021-02-02 | End: 2021-02-02

## 2021-02-02 RX ADMIN — LACTULOSE 30 G: 20 SOLUTION ORAL at 21:38

## 2021-02-02 RX ADMIN — ONDANSETRON 4 MG: 2 INJECTION INTRAMUSCULAR; INTRAVENOUS at 10:36

## 2021-02-02 RX ADMIN — ONDANSETRON 4 MG: 2 INJECTION INTRAMUSCULAR; INTRAVENOUS at 03:53

## 2021-02-02 RX ADMIN — SENNOSIDES 17.2 MG: 8.6 TABLET, FILM COATED ORAL at 21:38

## 2021-02-02 RX ADMIN — DOCUSATE SODIUM 100 MG: 100 CAPSULE ORAL at 21:38

## 2021-02-02 RX ADMIN — LIDOCAINE HYDROCHLORIDE 10 ML: 20 INJECTION, SOLUTION INFILTRATION; PERINEURAL at 11:58

## 2021-02-02 NOTE — H&P
Vitals: BP (!) 158/100   Pulse 102   Temp 98.3 °F (36.8 °C)   Resp 16   Ht 5' 5\" (1.651 m)   Wt 160 lb (72.6 kg)   SpO2 95%   BMI 26.63 kg/m²   General appearance: alert, appears stated age and cooperative  Skin:  No rashes or lesions on exposed skin  HEENT: Head: Normal, normocephalic, atraumatic. [place]: supple, symmetrical, trachea midline  Lungs: clear to auscultation bilaterally  Heart: regular rate and rhythm  Abdomen: Distended; dullness in the flanks; no tenderness  Extremities: +1 edema  Neurologic: Non focal    Recent Labs     02/01/21  1345   WBC 13.9*   HGB 16.4*   *     Recent Labs     02/01/21  1345   *   K 5.5*   CL 90*   CO2 28   BUN 25*   CREATININE 1.05*   GLUCOSE 191*   AST 53*   ALT 49*   BILITOT 0.9*   ALKPHOS 193*     Troponin T: No results for input(s): TROPONINI in the last 72 hours. ABGs: No results found for: PHART, PO2ART, QOA6ANR  INR:   Recent Labs     02/01/21  1345   INR 1.0     URINALYSIS:No results for input(s): NITRITE, COLORU, PHUR, LABCAST, WBCUA, RBCUA, MUCUS, TRICHOMONAS, YEAST, BACTERIA, CLARITYU, SPECGRAV, LEUKOCYTESUR, UROBILINOGEN, BILIRUBINUR, BLOODU, GLUCOSEU, AMORPHOUS in the last 72 hours.     Invalid input(s): KETONESU  -----------------------------------------------------------------   Ct Abdomen Pelvis W Iv Contrast Additional Contrast? None    Result Date: 2/1/2021 EXAM:  CT ABDOMEN PELVIS W IV CONTRAST History: Abdominal distention. Abdominal pain. Technique: Multiple contiguous axial images were obtained of the abdomen and pelvis from an level of the lung bases through the ischial tuberosities with IV contrast. Multiplanar reformats were obtained. Delayed images were obtained. Comparison: None available Findings: Small right pleural effusion. Bibasilar subsegmental atelectasis. Subtle nodularity of the liver. A 1 cm hypodense lesion within the right lobe of the liver is seen on axial series 2 image 29 and an ill-defined area of hypodensity within the inferior right lobe of the liver measuring approximately 2 cm is seen on axial  series 2 image 38. The gallbladder is physiologically distended and contains multiple small densities within the dependent portion. No gallbladder wall thickening is identified. The stomach, spleen, pancreas, and adrenal glands appear within normal limits. Small hiatal hernia. There is thickening and nodularity of the omentum. There is a large amount of ascites demonstrating simple fluid density. The kidneys enhance uniformly. There is mild right-sided hydronephrosis however no radiopaque or urinary tract calculi identified. No left-sided urinary tract calculi or hydronephrosis. Urinary bladder is suboptimally distended. There is mild perivesicular inflammation. The uterus is surgically absent Abdominal aorta is nonaneurysmal  . No retroperitoneal or abdominal/pelvic lymphadenopathy. No small bowel obstruction. No overt colonic mass or pericolonic inflammation although the large amount of ascites obscures evaluation for inflammation. There is a large amount of stool within the rectosigmoid colon. The appendix is not definitively visualized. No pneumoperitoneum or portal venous gas. No acute or aggressive osseous abnormality. Degenerative changes of the spine. Thickening and nodularity of the omentum concerning for peritoneal metastasis or peritonitis. A large amount of ascites is present. Two ill-defined lesions within the right lobe of the liver measuring 1 cm and 2 cm respectively are nonspecific but most concerning for metastatic lesions. Mild perivesicular inflammation. Correlation with urinalysis recommended to exclude cystitis. Large amount stool within the rectosigmoid colon may represent constipation and/or fecal impaction. Subtle nodularity of the liver suggests cirrhosis. Mild right-sided hydronephrosis without radiopaque calculus. Debris within the gallbladder likely represents gallstones and/or gallbladder sludge. Small right pleural effusion. All CT scans at this facility use dose modulation, iterative reconstruction, and/or weight based dosing when appropriate to reduce radiation dose to as low as reasonably achievable. Assessment and Plan   1. Suspected metastatic cancer with malignant ascites:  IR guided paracentesis; oncology for their opinion on possible need for lesion biopsy  2. Nausea/vomiting:  Likely related to constipation; aggressive bowel regimen  3. Right sided pleural effusion:  Likely from the ascites; plan for drainage tomorrow  4. Functional Status: Fall precautions. Up with assistance. PT OT  5. Diet: Clear liquid diet  6. DVT ppx: Lovenox SCDs  7. Disposition: Dependent on hospital course. Will discharge once medically stable. SW on board for discharge planning.      Patient Active Problem List   Diagnosis Code    Malignant ascites R18.0       Delores Quintero MD  Admitting Hospitalist    Emergency Contact:

## 2021-02-02 NOTE — SEDATION DOCUMENTATION
NO SEDATION    1135 U/S tech obtained images prior to start of procedure using U/S. Pt  VSS. 1135 Procedure explained, consent signed prior to patient arriving to ultrasound with floor nurse. .1156. Timeout completed for Ultrasound Guided Paracentesis. Using U/S, Dr. Filipe Eisenberg marked, prepped LLQ with Chloraprep and draped with sterile drape and towels. 1158 Site numbed with lidocaine. Krs5lzat Centesis 5F catheter placed using Ultrasound guidance. Fluid appears clear yellow. Dr. Filipe Eisenberg collected 70 ml fluid , which was taken to laboratory for diagnostic testing. Catheter tubing connected to [employer] machine to remove fluid. 1233 Pt tolerated well. Total 4440 ml removed. Catheter removed, pressure held and bandaid applied. Verbal and tactile reassurance given throughout.

## 2021-02-02 NOTE — CARE COORDINATION
[facility] Case Management Initial Discharge Assessment    Met with Patient and Family to discuss discharge plan. PCP: Bridger Shaw MD                                Date of Last Visit: VIRTUAL VISIT 2/1/21    If no PCP, list provided? N/A    Discharge Planning    Living Arrangements: independently at home    Who do you live with? SPOUSE    Who helps you with your care:  spouse    If lives at home:     Do you have any barriers navigating in your home? no    Patient can perform ADL? Yes    Current Services (outpatient and in home) :  None    Dialysis: No    Is transportation available to get to your appointments? Yes, DRIVES    DME Equipment:  no    Respiratory equipment: None    Respiratory provider:  no     [facility]?  No      Patient agreeable to [facility]? No    Patient agreeable to SNF/Rehab? No    Other discharge needs identified? N/A    Freedom of choice list provided with basic dialogue that supports the patient's individualized plan of care/goals and shares the quality data associated with the providers. Yes    Does Patient Have a High-Risk for Readmission Diagnosis (CHF, PN, MI, COPD)? No    If Yes,    ? Consult with pulmonologist? N/A  ? Consult with cardiologist? N/A  ? Cardiac Rehab referral if EF <35%? N/A  ? Consult with Pharmacy for medication assessment prior to discharge? N/A  ? Consult with Behavioral health to aid in depression, anxiety, or coping issues? N/A  ? Palliative Care Consult? N/A  ? Pulmonary Rehab order for COPD, PN, and CHF (if EF > 35%)? N/A   ? Does patient have a reliable scale and know how to read it (for CHF)? N/A  ? Nutrition consult for CHF? N/A  ? Respiratory therapy consult that includes bedside instruction on administration of nebulizers and/or inhalers, and assessment of oxygen and equipment needs in the home?  N/A The plan for Transition of Care is related to the following treatment goals: TO HOME WITH NO NEEDS AT THIS TIME. Initial Discharge Plan? (Note: please see concurrent daily documentation for any updates after initial note). FROM HOME WITH SPOUSE. HAS BEEN VERY INDEPENDENT AT HOME. SPOUSE DOES NOT FEEL THAT THERE WILL BE ANY SERVICES OR EQUIPMENT NEEDED FOR DC.  LSW TO FOLLOW AS DC NEEDS MAY CHANGE PENDING MEDICAL COURSE.       The Patient and/or patient representative: PT SPOUSE was provided with choice of any post-acute providers for care and equipment and agrees with discharge plan  Yes    Electronically signed by CASS Pennington on 2/2/2021 at 2:33 PM

## 2021-02-02 NOTE — PROGRESS NOTES
Assessed pt this morning. C/o nausea and refused oral meds/liquids. Fleets enema given and pt did have three bowel movements but states she feels she needs to go more. Paracentesis done this morning and pt reported improvement in discomfort. No other concerns at this time. Will continue to monitor.

## 2021-02-02 NOTE — ACP (ADVANCE CARE PLANNING)
Advance Care Planning     Advance Care Planning Activator (Inpatient)  Conversation Note      Date of ACP Conversation: 2/1/2021    Conversation Conducted with: Patient with Decision Making Capacity and her spouse at bedside. Dr. Rozina Morgan also spoke with patient about her wishes. ACP Activator: [facility] Decision Maker:     Current Designated Health Care Decision Maker:     Primary Decision Maker: Jd Elizondo Spouse - [phone]     Click here to complete Healthcare Decision Makers including section of the Healthcare Decision Maker Relationship (ie \"Primary\")  Today we documented Decision Maker(s) consistent with Legal Next of Kin hierarchy. Care Preferences    Ventilation: \"If you were in your present state of health and suddenly became very ill and were unable to breathe on your own, what would your preference be about the use of a ventilator (breathing machine) if it were available to you? \"      Would the patient desire the use of ventilator (breathing machine)?: yes    \"If your health worsens and it becomes clear that your chance of recovery is unlikely, what would your preference be about the use of a ventilator (breathing machine) if it were available to you? \"     Would the patient desire the use of ventilator (breathing machine)?: No      Resuscitation  \"CPR works best to restart the heart when there is a sudden event, like a heart attack, in someone who is otherwise healthy. Unfortunately, CPR does not typically restart the heart for people who have serious health conditions or who are very sick. \"    \"In the event your heart stopped as a result of an underlying serious health condition, would you want attempts to be made to restart your heart (answer \"yes\" for attempt to resuscitate) or would you prefer a natural death (answer \"no\" for do not attempt to resuscitate)? \" yes [x] Yes   [] No   Educated Patient / Lencho Bauman regarding differences between Advance Directives and portable DNR orders. Length of ACP Conversation in minutes:      Conversation Outcomes:  [x] ACP discussion completed  [] Existing advance directive reviewed with patient; no changes to patient's previously recorded wishes  [] New Advance Directive completed  [] Portable Do Not Rescitate prepared for Provider review and signature  [] POLST/POST/MOLST/MOST prepared for Provider review and signature      Follow-up plan:    [x] Schedule follow-up conversation to continue planning  [x] Referred individual to Provider for additional questions/concerns   [] Advised patient/agent/surrogate to review completed ACP document and update if needed with changes in condition, patient preferences or care setting    [x] This note routed to one or more involved healthcare providers    CM educated patient and her spouse regarding the purpose of Living [place] and HC-POA's. Patient states her  is an  and is her POA. CM clarified that a legal/financial POA is not the same as a HC-POA and a Living Will ensures that the patient's wishes are honored. Patient will request a spiritual care consult if/when she decides to complete Advance Directives.

## 2021-02-02 NOTE — FLOWSHEET NOTE
Pt admitted to room 476 and admission completed. Pt is a/o x4. Vitals stable. Pt c/o pain in RUQ of abdomen, nausea, and constipation. She is medicated per MAR. Pt oriented to room and call light, orders reviewed at bedside. Pt denies needs. Home meds reconciled. Pt is aware of order for soap suds enema, states she wants to try that \"in the morning. \"     Verbalizes improvement of nausea after dose of zofran given. No further complaints. Resting in bed. Call light in reach. Will continue to monitor.

## 2021-02-02 NOTE — CONSULTS
Consults    Patient Name: Michail Bamberger Date: 2021  1:30 PM  MR #: [id number]  : 1950    Attending Physician: Adam Echols MD  Reason for consult: Ascites    History of Presenting Illness:      Johnnie Cobos is a 69 y.o. female on hospital day 1 with a history of increasing abdominal distention and leg swelling since last few months, patient was not able to maintain her p.o. intake because of her increasing distention and she feels that she may have lost few pounds, no nausea no vomiting, no history of any hematemesis or melena. No fever no chills patient feels that she may have lost some muscle mass, no history of any pruritus. ,  Appetite was decreased, no prior history of any chronic liver disease, no history of any cardiac issues,, patient had a large volume paracentesis today with relief of her pain and discomfort. Social history does not smoke drinks alcohol very occasionally.  History Obtained From:  patient, spouse      History:      Past Medical History:   Diagnosis Date    Anxiety     panic attacks    Fibroids     has complete hysterctomy    Kidney stone     14 years ago     Past Surgical History:   Procedure Laterality Date    HYSTERECTOMY, TOTAL ABDOMINAL      PARACENTESIS Left 2021    4,440 ml removed by Dr. Jamal Wilkinson History  Family History   Problem Relation Age of Onset    Breast Cancer Mother     Heart Disease Father     Atrial Fibrillation Brother      [] Unable to obtain due to ventilated and/ or neurologic status    Social History     Socioeconomic History    Marital status:      Spouse name: Not on file    Number of children: Not on file    Years of education: Not on file    Highest education level: Not on file   Occupational History    Not on file   Social Needs    Financial resource strain: Not on file    Food insecurity     Worry: Never true     Inability: Never true    Transportation needs     Medical: No     Non-medical: No Tobacco Use    Smoking status: Never Smoker    Smokeless tobacco: Never Used   Substance and Sexual Activity    Alcohol use: Yes     Comment: occasional- socially    Drug use: Never    Sexual activity: Yes   Lifestyle    Physical activity     Days per week: Not on file     Minutes per session: Not on file    Stress: Not on file   Relationships    Social connections     Talks on phone: Not on file     Gets together: Not on file     Attends Church service: Not on file     Active member of club or organization: Not on file     Attends meetings of clubs or organizations: Not on file     Relationship status: Not on file    Intimate partner violence     Fear of current or ex partner: Not on file     Emotionally abused: Not on file     Physically abused: Not on file     Forced sexual activity: Not on file   Other Topics Concern    Not on file   Social History Narrative    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 [place] in [place] Others in [facility] up in [address]. [] Unable to obtain due to ventilated and/ or neurologic status      Home Medications:      Medications Prior to Admission: ondansetron (ZOFRAN) 4 MG tablet, Take 1 tablet by mouth every 12 hours as needed for Nausea or Vomiting  calcium citrate-vitamin D (CITRICAL + D) 315-250 MG-UNIT TABS per tablet, Take 2 tablets by mouth daily  Ascorbic Acid (VITAMIN C) 250 MG tablet, Take 500 mg by mouth daily    Current Hospital Medications:     Scheduled Meds:   lactulose  30 g Oral Once    sodium chloride flush  10 mL Intravenous Once    sodium chloride flush  10 mL Intravenous 2 times per day    enoxaparin  40 mg Subcutaneous Daily    senna  2 tablet Oral Nightly    docusate sodium  100 mg Oral BID    bisacodyl  10 mg Rectal Daily     Continuous Infusions:  PRN Meds:.sodium chloride flush, promethazine **OR** ondansetron, acetaminophen **OR** acetaminophen  . Allergies:      Allergies BUN 25*  --  21   CREATININE 1.05* 1.1 0.80     Recent Labs     02/01/21  1345   AST 53*   ALT 49*   BILITOT 0.9*   ALKPHOS 193*     Recent Labs     02/01/21  1345   LIPASE 59     Recent Labs     02/01/21  1345   PROT 7.6   INR 1.0     Ct Abdomen Pelvis W Iv Contrast Additional Contrast? None    Result Date: 2/1/2021 EXAM:  CT ABDOMEN PELVIS W IV CONTRAST History: Abdominal distention. Abdominal pain. Technique: Multiple contiguous axial images were obtained of the abdomen and pelvis from an level of the lung bases through the ischial tuberosities with IV contrast. Multiplanar reformats were obtained. Delayed images were obtained. Comparison: None available Findings: Small right pleural effusion. Bibasilar subsegmental atelectasis. Subtle nodularity of the liver. A 1 cm hypodense lesion within the right lobe of the liver is seen on axial series 2 image 29 and an ill-defined area of hypodensity within the inferior right lobe of the liver measuring approximately 2 cm is seen on axial  series 2 image 38. The gallbladder is physiologically distended and contains multiple small densities within the dependent portion. No gallbladder wall thickening is identified. The stomach, spleen, pancreas, and adrenal glands appear within normal limits. Small hiatal hernia. There is thickening and nodularity of the omentum. There is a large amount of ascites demonstrating simple fluid density. The kidneys enhance uniformly. There is mild right-sided hydronephrosis however no radiopaque or urinary tract calculi identified. No left-sided urinary tract calculi or hydronephrosis. Urinary bladder is suboptimally distended. There is mild perivesicular inflammation. The uterus is surgically absent Abdominal aorta is nonaneurysmal  . No retroperitoneal or abdominal/pelvic lymphadenopathy. No small bowel obstruction. No overt colonic mass or pericolonic inflammation although the large amount of ascites obscures evaluation for inflammation. There is a large amount of stool within the rectosigmoid colon. The appendix is not definitively visualized. No pneumoperitoneum or portal venous gas. No acute or aggressive osseous abnormality. Degenerative changes of the spine. Thickening and nodularity of the omentum concerning for peritoneal metastasis or peritonitis. A large amount of ascites is present. Two ill-defined lesions within the right lobe of the liver measuring 1 cm and 2 cm respectively are nonspecific but most concerning for metastatic lesions. Mild perivesicular inflammation. Correlation with urinalysis recommended to exclude cystitis. Large amount stool within the rectosigmoid colon may represent constipation and/or fecal impaction. Subtle nodularity of the liver suggests cirrhosis. Mild right-sided hydronephrosis without radiopaque calculus. Debris within the gallbladder likely represents gallstones and/or gallbladder sludge. Small right pleural effusion. All CT scans at this facility use dose modulation, iterative reconstruction, and/or weight based dosing when appropriate to reduce radiation dose to as low as reasonably achievable. Xr Abdomen (2 Views)    Result Date: 1/7/2021  EXAMINATION: XR ABDOMEN (2 VIEWS) DATE AND TIME:1/7/2021 12:44 PM CLINICAL HISTORY: Acute abdominal pain R10.30 Lower abdominal pain ICD10 COMPARISONS: None available. FINDINGS  There are nondilated loops of large and small bowel identified, with normal solid organ outlines. There is no evidence of free air or significant air-fluid levels on the upright view. No signs of bowel obstruction. Visualized lung bases are clear. . There are no visible renal or ureteral stones. The pelvis there is a calcification in the left likely a vascular phleboliths. Moderate volume of fecal debris. NORMAL GAS DISTRIBUTION IN A NONOBSTRUCTIVE PATTERN AS DISCUSSED ABOVE.         Impression: 69-year-old female admitted with progressive ascites and leg swelling, had therapeutic and diagnostic paracentesis today and fluid  so far are consistent with transudate. Serum ascites albumin gradient is 1.4, and cell count are not suggestive of SBP, sciatic fluid protein is 4.1 which is concerning for right-sided heart failure. Differential diagnosis includes intrinsic liver disease with cirrhosis, vascular disease such as portal vein thrombosis or hepatic vein thrombosis, metastatic disease or hepatic congestion secondary to right-sided heart failure.,  Patient has normal platelets and normal albumin and normal pro time. Plan: Will get Doppler study of the hepatic and portal vein tumor markers, serological markers for chronic hepatitis BC PBC and autoimmune hepatitis. Acetic fluid cytology is pending  Comments: Thank you for allowing us to participate in the care of this patient. Will continue to follow. Please call if questions or concerns arise.     Electronically signed by Amanda Menjivar MD on 2/2/2021 at 6:01 PM

## 2021-02-02 NOTE — PROGRESS NOTES
Hospitalist Progress Note      PCP: Anitha Dobson MD    Date of Admission: 2/1/2021    Chief Complaint:    Chief Complaint   Patient presents with    Fatigue     sent by dr Larry Montalvo for dehydration work up , patient has been vomiting and not keeping anything down and is very fatigued     Subjective: The patient feels much better today; her abdomen feels improved and her appetite is improved. She has had 3 large BM but feels like she has more to go. 12 point ROS negative other than mentioned above     Medications:  Reviewed    Infusion Medications   Scheduled Medications    lactulose  30 g Oral Once    sodium chloride flush  10 mL Intravenous Once    sodium chloride flush  10 mL Intravenous 2 times per day    enoxaparin  40 mg Subcutaneous Daily    senna  2 tablet Oral Nightly    docusate sodium  100 mg Oral BID    bisacodyl  10 mg Rectal Daily     PRN Meds: sodium chloride flush, promethazine **OR** ondansetron, acetaminophen **OR** acetaminophen      Intake/Output Summary (Last 24 hours) at 2/2/2021 1835  Last data filed at 2/2/2021 0505  Gross per 24 hour   Intake 144 ml   Output 100 ml   Net 44 ml     Exam:    /78   Pulse 91   Temp 97.9 °F (36.6 °C)   Resp 16   Ht 5' 5\" (1.651 m)   Wt 153 lb 14.4 oz (69.8 kg)   SpO2 95%   BMI 25.61 kg/m²     General appearance: No apparent distress, appears stated age and cooperative. HEENT: Conjunctivae/corneas clear. Neck:  Trachea midline. Respiratory:  Normal respiratory effort. Clear to auscultation  Cardiovascular: Regular rate and rhythm   Abdomen: Soft, non-tender, non-distended with normal bowel sounds. Musculoskeletal: No clubbing, cyanosis or edema bilaterally.     Neuro: Non Focal  Capillary Refill: Brisk,< 3 seconds   Peripheral Pulses: +2 palpable, equal bilaterally     Labs:   Recent Labs     02/01/21  1345 02/02/21  0607   WBC 13.9* 10.9*   HGB 16.4* 14.0   HCT 49.4* 41.7   * 393     Recent Labs     02/01/21  1345 02/01/21 1418 02/02/21  0607   *  --  134*   K 5.5*  --  3.7   CL 90*  --  96   CO2 28  --  22   BUN 25*  --  21   CREATININE 1.05* 1.1 0.80   CALCIUM 9.6  --  8.6     Recent Labs     02/01/21  1345 02/02/21  0607   AST 53* 32   ALT 49* 38*   BILIDIR  --  0.3   BILITOT 0.9* 0.6   ALKPHOS 193* 139*     Recent Labs     02/01/21  1345   INR 1.0     No results for input(s): Tara Sutton in the last 72 hours. Urinalysis:      Lab Results   Component Value Date    NITRU Negative 02/01/2021    BLOODU Negative 02/01/2021    SPECGRAV 1.065 02/01/2021    GLUCOSEU Negative 02/01/2021       Radiology:  CT ABDOMEN PELVIS W IV CONTRAST Additional Contrast? None   Final Result      Thickening and nodularity of the omentum concerning for peritoneal metastasis or peritonitis. A large amount of ascites is present. Two ill-defined lesions within the right lobe of the liver measuring 1 cm and 2 cm respectively are nonspecific but most concerning for metastatic lesions. Mild perivesicular inflammation. Correlation with urinalysis recommended to exclude cystitis. Large amount stool within the rectosigmoid colon may represent constipation and/or fecal impaction. Subtle nodularity of the liver suggests cirrhosis. Mild right-sided hydronephrosis without radiopaque calculus. Debris within the gallbladder likely represents gallstones and/or gallbladder sludge. Small right pleural effusion. All CT scans at this facility use dose modulation, iterative reconstruction, and/or weight based dosing when appropriate to reduce radiation dose to as low as reasonably achievable.       US GUIDED PARACENTESIS    (Results Pending)   US ABDOMEN LIMITED    (Results Pending)   US DUP ABD PEL RETRO SCROT COMPLETE    (Results Pending)     Assessment/Plan: 1. Suspected metastatic cancer with malignant ascites:  s/p paracentesis with 4L removed. Unclear etiology still. Recalculated SAAG based on this mornings LFTs (added on) and SAAG was 0.7 suggestive of possible exudative effusion  2. Nausea/vomiting:  Likely related to constipation; aggressive bowel regimen resulted in improvement  3. Right sided pleural effusion:  Likely from the ascites; ascites drained  4. Functional Status: Fall precautions. Up with assistance. PT OT  5. Diet: Clear liquid diet  6. DVT ppx: Lovenox SCDs  7. Disposition: Dependent on hospital course. Will discharge once medically stable. SW on board for discharge planning. Active Hospital Problems    Diagnosis Date Noted    Fatigue [R53.83]     Malignant ascites [R18.0] 02/01/2021     Additional work up or/and treatment plan may be added today or then after based on clinical progression. I am managing a portion of pt care. Some medical issues are handled by other specialists. Additional work up and treatment should be done in out pt setting by pt PCP and other out pt providers. In addition to examining and evaluating pt, I spent additional time explaining care, normal and abnormal findings, and treatment plan. All of pt questions were answered. Counseling, diet and education were  provided. Case will be discussed with nursing staff when appropriate. Family will be updated if and when appropriate.       Diet: DIET CLEAR LIQUID;    Code Status: Full Code    PT/OT Eval     Electronically signed by Marquis Reece MD on 2/2/2021 at 6:35 PM

## 2021-02-03 ENCOUNTER — APPOINTMENT (OUTPATIENT)
Dept: ULTRASOUND IMAGING | Age: 71
DRG: 981 | End: 2021-02-03
Payer: MEDICARE

## 2021-02-03 LAB
ANION GAP SERPL CALCULATED.3IONS-SCNC: 14 MEQ/L (ref 9–15)
BASOPHILS ABSOLUTE: 0.1 K/UL (ref 0–0.2)
BASOPHILS RELATIVE PERCENT: 0.5 %
BUN BLDV-MCNC: 18 MG/DL (ref 8–23)
CALCIUM SERPL-MCNC: 8.5 MG/DL (ref 8.5–9.9)
CHLORIDE BLD-SCNC: 100 MEQ/L (ref 95–107)
CO2: 24 MEQ/L (ref 20–31)
CREAT SERPL-MCNC: 0.72 MG/DL (ref 0.5–0.9)
EOSINOPHILS ABSOLUTE: 0 K/UL (ref 0–0.7)
EOSINOPHILS RELATIVE PERCENT: 0.1 %
GFR AFRICAN AMERICAN: >60
GFR NON-AFRICAN AMERICAN: >60
GLUCOSE BLD-MCNC: 145 MG/DL (ref 70–99)
HCT VFR BLD CALC: 42.5 % (ref 37–47)
HEMOGLOBIN: 14.2 G/DL (ref 12–16)
LYMPHOCYTES ABSOLUTE: 0.9 K/UL (ref 1–4.8)
LYMPHOCYTES RELATIVE PERCENT: 7.9 %
MAGNESIUM: 2.2 MG/DL (ref 1.7–2.4)
MCH RBC QN AUTO: 29.1 PG (ref 27–31.3)
MCHC RBC AUTO-ENTMCNC: 33.3 % (ref 33–37)
MCV RBC AUTO: 87.3 FL (ref 82–100)
MONOCYTES ABSOLUTE: 0.9 K/UL (ref 0.2–0.8)
MONOCYTES RELATIVE PERCENT: 8.5 %
NEUTROPHILS ABSOLUTE: 9.3 K/UL (ref 1.4–6.5)
NEUTROPHILS RELATIVE PERCENT: 83 %
PATH CONSULT FLUID: NORMAL
PDW BLD-RTO: 13.1 % (ref 11.5–14.5)
PLATELET # BLD: 403 K/UL (ref 130–400)
POTASSIUM REFLEX MAGNESIUM: 3.2 MEQ/L (ref 3.4–4.9)
RBC # BLD: 4.87 M/UL (ref 4.2–5.4)
SODIUM BLD-SCNC: 138 MEQ/L (ref 135–144)
WBC # BLD: 11.2 K/UL (ref 4.8–10.8)

## 2021-02-03 PROCEDURE — 6360000002 HC RX W HCPCS: Performed by: NURSE PRACTITIONER

## 2021-02-03 PROCEDURE — 2580000003 HC RX 258: Performed by: INTERNAL MEDICINE

## 2021-02-03 PROCEDURE — 85025 COMPLETE CBC W/AUTO DIFF WBC: CPT

## 2021-02-03 PROCEDURE — 83516 IMMUNOASSAY NONANTIBODY: CPT

## 2021-02-03 PROCEDURE — 93308 TTE F-UP OR LMTD: CPT

## 2021-02-03 PROCEDURE — 76856 US EXAM PELVIC COMPLETE: CPT

## 2021-02-03 PROCEDURE — 83735 ASSAY OF MAGNESIUM: CPT

## 2021-02-03 PROCEDURE — 6360000002 HC RX W HCPCS: Performed by: INTERNAL MEDICINE

## 2021-02-03 PROCEDURE — 80048 BASIC METABOLIC PNL TOTAL CA: CPT

## 2021-02-03 PROCEDURE — 93975 VASCULAR STUDY: CPT

## 2021-02-03 PROCEDURE — 36415 COLL VENOUS BLD VENIPUNCTURE: CPT

## 2021-02-03 PROCEDURE — 1210000000 HC MED SURG R&B

## 2021-02-03 PROCEDURE — 86039 ANTINUCLEAR ANTIBODIES (ANA): CPT

## 2021-02-03 PROCEDURE — 99232 SBSQ HOSP IP/OBS MODERATE 35: CPT | Performed by: SPECIALIST

## 2021-02-03 RX ORDER — METOCLOPRAMIDE HYDROCHLORIDE 5 MG/ML
10 INJECTION INTRAMUSCULAR; INTRAVENOUS ONCE
Status: COMPLETED | OUTPATIENT
Start: 2021-02-03 | End: 2021-02-03

## 2021-02-03 RX ADMIN — ONDANSETRON 4 MG: 2 INJECTION INTRAMUSCULAR; INTRAVENOUS at 13:38

## 2021-02-03 RX ADMIN — ONDANSETRON 4 MG: 2 INJECTION INTRAMUSCULAR; INTRAVENOUS at 06:35

## 2021-02-03 RX ADMIN — Medication 10 ML: at 19:47

## 2021-02-03 RX ADMIN — ONDANSETRON 4 MG: 2 INJECTION INTRAMUSCULAR; INTRAVENOUS at 00:25

## 2021-02-03 RX ADMIN — METOCLOPRAMIDE 10 MG: 5 INJECTION, SOLUTION INTRAMUSCULAR; INTRAVENOUS at 23:43

## 2021-02-03 RX ADMIN — ONDANSETRON 4 MG: 2 INJECTION INTRAMUSCULAR; INTRAVENOUS at 19:48

## 2021-02-03 RX ADMIN — ENOXAPARIN SODIUM 40 MG: 40 INJECTION SUBCUTANEOUS at 19:54

## 2021-02-03 ASSESSMENT — ENCOUNTER SYMPTOMS
CONSTIPATION: 0
SHORTNESS OF BREATH: 0
DIARRHEA: 0
WHEEZING: 0
BACK PAIN: 0
VOMITING: 1
COUGH: 0
ABDOMINAL DISTENTION: 1
EYES NEGATIVE: 1
PHOTOPHOBIA: 0
NAUSEA: 1
RESPIRATORY NEGATIVE: 1
ABDOMINAL PAIN: 1

## 2021-02-03 NOTE — CONSULTS
Medical: No     Non-medical: No   Tobacco Use    Smoking status: Never Smoker    Smokeless tobacco: Never Used   Substance and Sexual Activity    Alcohol use: Yes     Comment: occasional- socially    Drug use: Never    Sexual activity: Yes   Lifestyle    Physical activity     Days per week: None     Minutes per session: None    Stress: None   Relationships    Social connections     Talks on phone: None     Gets together: None     Attends Faith service: None     Active member of club or organization: None     Attends meetings of clubs or organizations: None     Relationship status: None    Intimate partner violence     Fear of current or ex partner: None     Emotionally abused: None     Physically abused: None     Forced sexual activity: None   Other Topics Concern    None   Social History Narrative    Retired- worked with  in [employer]     3 [place] Others in [address] up in [address]. Allergies   Allergen Reactions    Aspirin Other (See Comments)     GI issue       No current facility-administered medications on file prior to encounter. Current Outpatient Medications on File Prior to Encounter   Medication Sig Dispense Refill    ondansetron (ZOFRAN) 4 MG tablet Take 1 tablet by mouth every 12 hours as needed for Nausea or Vomiting 30 tablet 0    calcium citrate-vitamin D (CITRICAL + D) 315-250 MG-UNIT TABS per tablet Take 2 tablets by mouth daily      Ascorbic Acid (VITAMIN C) 250 MG tablet Take 500 mg by mouth daily         Review of Systems   Constitutional: Positive for fatigue. Negative for chills, diaphoresis and fever. HENT: Negative. Negative for congestion, ear pain, hearing loss and tinnitus. Eyes: Negative. Negative for photophobia. Respiratory: Negative. Negative for cough, shortness of breath and wheezing. Cardiovascular: Negative. Negative for chest pain, palpitations and leg swelling. Gastrointestinal: Positive for abdominal distention, abdominal pain, nausea and vomiting. Negative for constipation and diarrhea. Genitourinary: Negative. Negative for dysuria, flank pain, frequency, hematuria and urgency. Musculoskeletal: Negative. Negative for back pain and neck pain. Skin: Negative. Negative for rash. Allergic/Immunologic: Negative for environmental allergies. Neurological: Negative. Negative for dizziness, tremors, weakness and headaches. Hematological: Does not bruise/bleed easily. Psychiatric/Behavioral: Negative. Negative for hallucinations and suicidal ideas. The patient is not nervous/anxious. OBJECTIVE:  BP (!) 154/85   Pulse 94   Temp 97.9 °F (36.6 °C) (Oral)   Resp 16   Ht 5' 5\" (1.651 m)   Wt 153 lb 14.4 oz (69.8 kg)   SpO2 96%   BMI 25.61 kg/m²     Physical Exam  Constitutional:       General: She is not in acute distress. Appearance: She is well-developed. She is ill-appearing. She is not diaphoretic. HENT:      Head: Normocephalic and atraumatic. Nose: Nose normal.      Mouth/Throat:      Pharynx: No oropharyngeal exudate. Eyes:      General: No scleral icterus. Right eye: No discharge. Left eye: No discharge. Conjunctiva/sclera: Conjunctivae normal.   Neck:      Musculoskeletal: Neck supple. Thyroid: No thyromegaly. Vascular: No JVD. Trachea: No tracheal deviation. Cardiovascular:      Rate and Rhythm: Normal rate and regular rhythm. Heart sounds: Normal heart sounds. No murmur. No friction rub. No gallop. Pulmonary:      Effort: No respiratory distress. Breath sounds: No stridor. No wheezing or rales. Chest:      Chest wall: No tenderness. Abdominal:      General: Bowel sounds are normal. There is distension. Palpations: Abdomen is soft. There is no mass. Tenderness: There is abdominal tenderness. There is no guarding or rebound. Hernia: No hernia is present. Musculoskeletal:         General: No tenderness or deformity. Skin:     General: Skin is dry. Coloration: Skin is not pale. Findings: No erythema or rash. Neurological:      Mental Status: She is alert and oriented to person, place, and time. Cranial Nerves: No cranial nerve deficit. Psychiatric:         Behavior: Behavior normal.         Thought Content:  Thought content normal.         Judgment: Judgment normal.         ASSESSMENT ANDPLAN:    ASSESSMENT: Patient with ascites and thickened omentum, concerning for metastatic disease    PLAN: Diagnostic and therapeutic ultrasound-guided paracentesis    Jocelin Donis MD, [facility]

## 2021-02-03 NOTE — PROGRESS NOTES
Comprehensive Nutrition Assessment    Type and Reason for Visit:  Initial, Positive Nutrition Screen(poor po/wt loss)    Nutrition Recommendations/Plan:   Continue Clear liquid diet. Advance to General diet as tolerated when OK with MD.    Start clear liquid ONS TID    Nutrition Assessment:  Pt admitted with malignant ascites. UTD nutrition status pta, per notes poor po pta due to abdominal discomfort. S/P paracentesis today. Currently on clear liquid diet. Will start a clear liquid ONS, recommend advance to General diet as tolerated    Malnutrition Assessment:  Malnutrition Status: At risk for malnutrition (Comment)    Context:  Acute Illness     Findings of the 6 clinical characteristics of malnutrition:  Energy Intake:  Mild decrease in energy intake (Comment)  Weight Loss:  7 - Greater than 5% over 1 month     Body Fat Loss:  Unable to assess     Muscle Mass Loss:  Unable to assess    Fluid Accumulation:  Unable to assess     Strength:  Not Performed    Estimated Daily Nutrient Needs:  Energy (kcal):  0331-6031 (kg x 22-25); Weight Used for Energy Requirements:  Current(69.8 kg)     Protein (g):  73-79 gm (kg IBW x 1.3-1.4); Weight Used for Protein Requirements:  Ideal(56.8 kg)        Fluid (ml/day):  ~1700 ml; Method Used for Fluid Requirements:  1 ml/kcal      Nutrition Related Findings:  abdomen rounded/constipated. BM (2/2) 2+ BLE edema noted, paracentesis (2/2)      Wounds:  None       Current Nutrition Therapies:    DIET CLEAR LIQUID; Anthropometric Measures:  · Height: 5' 5\" (165.1 cm)  · Current Body Weight: 153 lb (69.4 kg)   · Admission Body Weight: 160 lb (72.6 kg)(stated)    · Usual Body Weight: 165 lb (74.8 kg)(1/7/21)     · Ideal Body Weight: 125 lbs; % Ideal Body Weight  > 100%  · BMI: 25.5  · BMI Categories: Overweight (BMI 25.0-29. 9)       Nutrition Diagnosis: · Inadequate oral intake related to other (comment)(malignant ascites) as evidenced by poor intake prior to admission, weight loss    Nutrition Interventions:   Food and/or Nutrient Delivery:  Continue Current Diet, Start Oral Nutrition Supplement(Continue Clear liquid diet.   Advance to General diet as tolerated when OK with MD.  Start clear liquid ONS TID)  Nutrition Education/Counseling:  No recommendation at this time   Coordination of Nutrition Care:  Continue to monitor while inpatient    Goals:  ability to tolerate diet advancement with po intake > 75% of meals and supplements, stable wt ~ 153 lb       Nutrition Monitoring and Evaluation:   Food/Nutrient Intake Outcomes:  Diet Advancement/Tolerance, Food and Nutrient Intake, Supplement Intake  Physical Signs/Symptoms Outcomes:  Biochemical Data, Fluid Status or Edema, Weight     Electronically signed by Diaz Schwartz RD, LD on 2/3/21 at 5:50 PM EST

## 2021-02-03 NOTE — PROGRESS NOTES
Patient alert and oriented times four. Patient is having nausea and dry heaves. We have given 2 soap suds enemas. The first had only brown liquid. Awaiting second result. Patient has ascites and tenderness with pressure. Bowel sounds are present. Lung sounds clear no coughing. No new complaints at this time.

## 2021-02-03 NOTE — PROGRESS NOTES
Hospitalist Progress Note      PCP: Janna Aranda MD    Date of Admission: 2/1/2021    Chief Complaint:    Chief Complaint   Patient presents with    Fatigue     sent by dr Kris Wasserman for dehydration work up , patient has been vomiting and not keeping anything down and is very fatigued     Subjective: The patient feels nauseous today; denies fevers, chills, sweats; no BM from the lactulose yesterday. 12 point ROS negative other than mentioned above     Medications:  Reviewed    Infusion Medications   Scheduled Medications    sodium chloride flush  10 mL Intravenous Once    sodium chloride flush  10 mL Intravenous 2 times per day    enoxaparin  40 mg Subcutaneous Daily    senna  2 tablet Oral Nightly    docusate sodium  100 mg Oral BID    bisacodyl  10 mg Rectal Daily     PRN Meds: sodium chloride flush, promethazine **OR** ondansetron, acetaminophen **OR** acetaminophen    No intake or output data in the 24 hours ending 02/03/21 1814  Exam:    BP (!) 154/85   Pulse 94   Temp 97.9 °F (36.6 °C) (Oral)   Resp 16   Ht 5' 5\" (1.651 m)   Wt 153 lb 14.4 oz (69.8 kg)   SpO2 96%   BMI 25.61 kg/m²     General appearance: No apparent distress, appears stated age and cooperative. HEENT: Conjunctivae/corneas clear. Neck:  Trachea midline. Respiratory:  Normal respiratory effort. Clear to auscultation  Cardiovascular: Regular rate and rhythm   Abdomen: Soft, non-tender, non-distended with normal bowel sounds. Musculoskeletal: No clubbing, cyanosis or edema bilaterally.     Neuro: Non Focal  Capillary Refill: Brisk,< 3 seconds   Peripheral Pulses: +2 palpable, equal bilaterally     Labs:   Recent Labs     02/01/21  1345 02/02/21  0607 02/03/21  0611   WBC 13.9* 10.9* 11.2*   HGB 16.4* 14.0 14.2   HCT 49.4* 41.7 42.5   * 393 403*     Recent Labs     02/01/21  1345 02/01/21  1418 02/02/21  0607 02/03/21  0611   *  --  134* 138   K 5.5*  --  3.7 3.2*   CL 90*  --  96 100   CO2 28  --  22 24 TECHNIQUE:  Transabdominal ultrasound of the right upper quadrant with both duplex color ultrasound and spectral Doppler ultrasound with interrogation of the hepatic and portal venous system was performed. FINDINGS:     The visualized portion of the right lobe of the liver shows no focal parenchymal abnormalities. The hypoechoic area seen at the inferior medial aspect of the right lobe of liver on CT is not appreciated. Within the field-of-view there are findings of cholelithiasis. There is ascites present. The hepatic veins and portal veins are patent. Normal direction seen within the portal veins and hepatic vein. IMPRESSION:        1. ASCITES IS PRESENT WITHIN THE FIELD-OF-VIEW. 2. CHOLELITHIASIS. 3. UNREMARKABLE SONOGRAPHIC EXAMINATION OF THE HEPATIC AND PORTAL VEIN. US GUIDED PARACENTESIS   Final Result   1. Status post technically successful ultrasound-guided paracentesis. Yelitza Zee is a Female of 69 years age, referred for Ultrasound Guided Paracentesis. PROCEDURE: Survey of the abdomen showed large amount of ascites fluid. After obtaining informed consent, the patient was positioned supine on the sonography table. Using ultrasound, the skin over the left hemiabdomen was locally anesthetized with 1% lidocaine. Following that, a Yueh needle was advanced into the fluid    pocket using ultrasound visualization. 4440cc, of clear yellow fluid were aspirated and sent for cytology, and pathology. The needle was removed, and hemostasis was obtained with pressure. A Band-Aid was placed. Post procedure images did not demonstrate hemorrhage at the target site. The patient tolerated the procedure well. The patient left the department in good condition. A radiology nurse was in presence monitoring vital signs, assisting throughout the procedure.             US ABDOMEN LIMITED   Final Result The portal veins, hepatic vein, and common hepatic artery are patent with normal direction of blood flow. No lesions are seen in the right liver lobe, though ultrasound is limited, and if there is clinical suspicion, MRI of the liver can be    obtained. CT ABDOMEN PELVIS W IV CONTRAST Additional Contrast? None   Final Result      Thickening and nodularity of the omentum concerning for peritoneal metastasis or peritonitis. A large amount of ascites is present. Two ill-defined lesions within the right lobe of the liver measuring 1 cm and 2 cm respectively are nonspecific but most concerning for metastatic lesions. Mild perivesicular inflammation. Correlation with urinalysis recommended to exclude cystitis. Large amount stool within the rectosigmoid colon may represent constipation and/or fecal impaction. Subtle nodularity of the liver suggests cirrhosis. Mild right-sided hydronephrosis without radiopaque calculus. Debris within the gallbladder likely represents gallstones and/or gallbladder sludge. Small right pleural effusion. All CT scans at this facility use dose modulation, iterative reconstruction, and/or weight based dosing when appropriate to reduce radiation dose to as low as reasonably achievable. Assessment/Plan:    1. Suspected metastatic cancer with malignant ascites:  s/p paracentesis with 4L removed. Unclear etiology still. Recalculated SAAG based on this mornings LFTs (added on) and SAAG was 0.7 suggestive of possible exudative effusion; oncology to see patient today  2. Nausea/vomiting:  Likely related to constipation; no BM from the lactulose; will give another enema today to see if it helps; discussed with GI and less likely GB pathology; coffman sign was negative  3. Right sided pleural effusion:  Likely from the ascites; ascites drained  4. Functional Status: Fall precautions. Up with assistance. PT OT  5.  Diet: Clear liquid diet 6. DVT ppx: Lovenox SCDs  7. Disposition: Dependent on hospital course. Will discharge once medically stable. SW on board for discharge planning. Active Hospital Problems    Diagnosis Date Noted    Fatigue [R53.83]     Malignant ascites [R18.0] 02/01/2021     Additional work up or/and treatment plan may be added today or then after based on clinical progression. I am managing a portion of pt care. Some medical issues are handled by other specialists. Additional work up and treatment should be done in out pt setting by pt PCP and other out pt providers. In addition to examining and evaluating pt, I spent additional time explaining care, normal and abnormal findings, and treatment plan. All of pt questions were answered. Counseling, diet and education were  provided. Case will be discussed with nursing staff when appropriate. Family will be updated if and when appropriate.       Diet: DIET CLEAR LIQUID;  Dietary Nutrition Supplements: Clear Liquid Oral Supplement    Code Status: Full Code    PT/OT Eval     Electronically signed by Yumiko Duke MD on 2/3/2021 at 6:14 PM

## 2021-02-03 NOTE — PROGRESS NOTES
Basic Nursing Care: Adult/Older Adult Patient Progress Note    Data:    [id number]: Pt events overnight pt remained nauseated and continued to complain of not being able to have a bowel movement. Vitals stable, pt medicated with zofran and dulcolax suppository. Will continue to monitor closely. Bed in low position, bed alarm on, call light within reach.

## 2021-02-03 NOTE — PROGRESS NOTES
Objective Doppler study of the hepatic and portal vein is normal, pelvic ultrasound showed status post hysterectomy and no ovarian tissue seen, SAAG on repeat fluid study is consistent with an exudate, it was 0.7. Serum CA-125 is abnormal, mildly elevated CEA level, cytology still pending  Assessment & Plan, probably malignant ascites. ,  If cytology is negative would need laparoscopy and peritoneal biopsy.     Regan Blue MD  2/3/2021

## 2021-02-03 NOTE — PROGRESS NOTES
Pt given SSE per orders. Pt tolerated approximately 500 mL before getting up to [facility]. Pt on BSC for five minutes. Pt emptied brown, watery stool. This LPN suggested to pt to attempt remaining SSE. Pt agreeable. Remainder of SSE administered. Pt tolerated moderately well. Pt up to [facility] at this time.

## 2021-02-03 NOTE — PLAN OF CARE
Nutrition Problem #1: Inadequate oral intake  Intervention: Food and/or Nutrient Delivery: Continue Current Diet, Start Oral Nutrition Supplement(Continue Clear liquid diet.   Advance to General diet as tolerated when OK with MD.  Start clear liquid ONS TID)  Nutritional Goals: ability to tolerate diet advancement with po intake > 75% of meals and supplements, stable wt ~ 153 lb

## 2021-02-04 ENCOUNTER — APPOINTMENT (OUTPATIENT)
Dept: GENERAL RADIOLOGY | Age: 71
DRG: 981 | End: 2021-02-04
Payer: MEDICARE

## 2021-02-04 LAB
ANION GAP SERPL CALCULATED.3IONS-SCNC: 15 MEQ/L (ref 9–15)
BASOPHILS ABSOLUTE: 0.1 K/UL (ref 0–0.2)
BASOPHILS RELATIVE PERCENT: 0.5 %
BUN BLDV-MCNC: 20 MG/DL (ref 8–23)
CA 19-9: 2664 U/ML (ref 0–35)
CALCIUM SERPL-MCNC: 8.5 MG/DL (ref 8.5–9.9)
CHLORIDE BLD-SCNC: 97 MEQ/L (ref 95–107)
CO2: 23 MEQ/L (ref 20–31)
CREAT SERPL-MCNC: 0.87 MG/DL (ref 0.5–0.9)
EOSINOPHILS ABSOLUTE: 0 K/UL (ref 0–0.7)
EOSINOPHILS RELATIVE PERCENT: 0.3 %
GFR AFRICAN AMERICAN: >60
GFR NON-AFRICAN AMERICAN: >60
GLUCOSE BLD-MCNC: 140 MG/DL (ref 70–99)
HCT VFR BLD CALC: 44.2 % (ref 37–47)
HEMOGLOBIN: 14.5 G/DL (ref 12–16)
LIVER-KIDNEY MICROSOME-1 AB IGG: 0.8 U (ref 0–24.9)
LYMPHOCYTES ABSOLUTE: 1.6 K/UL (ref 1–4.8)
LYMPHOCYTES RELATIVE PERCENT: 12.9 %
MCH RBC QN AUTO: 29 PG (ref 27–31.3)
MCHC RBC AUTO-ENTMCNC: 32.8 % (ref 33–37)
MCV RBC AUTO: 88.4 FL (ref 82–100)
MITOCHONDRIAL M2 AB, IGG: 2.3 UNITS (ref 0–24.9)
MONOCYTES ABSOLUTE: 1.2 K/UL (ref 0.2–0.8)
MONOCYTES RELATIVE PERCENT: 9.7 %
NEUTROPHILS ABSOLUTE: 9.2 K/UL (ref 1.4–6.5)
NEUTROPHILS RELATIVE PERCENT: 76.6 %
PDW BLD-RTO: 13 % (ref 11.5–14.5)
PLATELET # BLD: 448 K/UL (ref 130–400)
PLATELET SLIDE REVIEW: ABNORMAL
POTASSIUM REFLEX MAGNESIUM: 4.4 MEQ/L (ref 3.4–4.9)
RBC # BLD: 5 M/UL (ref 4.2–5.4)
RBC # BLD: NORMAL 10*6/UL
SODIUM BLD-SCNC: 135 MEQ/L (ref 135–144)
WBC # BLD: 12.1 K/UL (ref 4.8–10.8)

## 2021-02-04 PROCEDURE — 74018 RADEX ABDOMEN 1 VIEW: CPT

## 2021-02-04 PROCEDURE — 6360000002 HC RX W HCPCS: Performed by: INTERNAL MEDICINE

## 2021-02-04 PROCEDURE — 6370000000 HC RX 637 (ALT 250 FOR IP): Performed by: INTERNAL MEDICINE

## 2021-02-04 PROCEDURE — 99221 1ST HOSP IP/OBS SF/LOW 40: CPT | Performed by: COLON & RECTAL SURGERY

## 2021-02-04 PROCEDURE — 85025 COMPLETE CBC W/AUTO DIFF WBC: CPT

## 2021-02-04 PROCEDURE — 36415 COLL VENOUS BLD VENIPUNCTURE: CPT

## 2021-02-04 PROCEDURE — 1210000000 HC MED SURG R&B

## 2021-02-04 PROCEDURE — 80048 BASIC METABOLIC PNL TOTAL CA: CPT

## 2021-02-04 PROCEDURE — 99231 SBSQ HOSP IP/OBS SF/LOW 25: CPT | Performed by: SPECIALIST

## 2021-02-04 PROCEDURE — 2580000003 HC RX 258: Performed by: INTERNAL MEDICINE

## 2021-02-04 PROCEDURE — 71045 X-RAY EXAM CHEST 1 VIEW: CPT

## 2021-02-04 RX ORDER — SPIRONOLACTONE 25 MG/1
50 TABLET ORAL DAILY
Status: DISCONTINUED | OUTPATIENT
Start: 2021-02-04 | End: 2021-02-18 | Stop reason: HOSPADM

## 2021-02-04 RX ORDER — FUROSEMIDE 20 MG/1
20 TABLET ORAL DAILY
Status: DISCONTINUED | OUTPATIENT
Start: 2021-02-04 | End: 2021-02-18 | Stop reason: HOSPADM

## 2021-02-04 RX ORDER — DEXTROSE, SODIUM CHLORIDE, SODIUM LACTATE, POTASSIUM CHLORIDE, AND CALCIUM CHLORIDE 5; .6; .31; .03; .02 G/100ML; G/100ML; G/100ML; G/100ML; G/100ML
INJECTION, SOLUTION INTRAVENOUS CONTINUOUS
Status: DISCONTINUED | OUTPATIENT
Start: 2021-02-04 | End: 2021-02-06

## 2021-02-04 RX ADMIN — ONDANSETRON 4 MG: 2 INJECTION INTRAMUSCULAR; INTRAVENOUS at 05:14

## 2021-02-04 RX ADMIN — BISACODYL 10 MG: 10 SUPPOSITORY RECTAL at 09:04

## 2021-02-04 RX ADMIN — ENOXAPARIN SODIUM 40 MG: 40 INJECTION SUBCUTANEOUS at 09:03

## 2021-02-04 RX ADMIN — SODIUM CHLORIDE, SODIUM LACTATE, POTASSIUM CHLORIDE, CALCIUM CHLORIDE AND DEXTROSE MONOHYDRATE: 5; 600; 310; 30; 20 INJECTION, SOLUTION INTRAVENOUS at 14:47

## 2021-02-04 RX ADMIN — DOCUSATE SODIUM 100 MG: 100 CAPSULE ORAL at 09:04

## 2021-02-04 NOTE — CONSULTS
Hematology/Oncology Consult  Encounter Date: 2021 11:28 AM    Ms. Julienne Burton is a 69 y.o. female  : 1950  MRN: [id number]  Kaz Torrez Number: [de-identified]  Requesting Provider: Dr. Papi Cr    Reason for request: malignant ascites      CONSULTANT: Valorie Arriaga MD    HPI: The pt is a 70 yo WF with remote Hx of complete hysterectomy (pt unsure whether her ovaries were removed). She developed anorexia with N/V,constipation and then developed worsening abdominal distension . She had 20 lb weight loss over 2 months. No gross bleeding. Pt had increased N/V with generalized weakness. Pt has occ abd pain. No fever. Pt went to the ER and was admitted. She had paracentesis done and preliminary path report suspicious for  Malignancy. Cytology from 21 showed Atypical mesothelial cells, favor reactive, macrophages, RBCs and WBCs present.  Correlate results with concurrent cytology specimen results. No gross bleeding; No Headache or dizziness. No chest pain or SOB or cough. No back pain.      Patient Active Problem List   Diagnosis    Malignant ascites    Fatigue     Past Medical History:   Diagnosis Date    Anxiety     panic attacks    Fibroids     has complete hysterctomy    Kidney stone     14 years ago     Past Surgical History:   Procedure Laterality Date    HYSTERECTOMY, TOTAL ABDOMINAL      PARACENTESIS Left 2021    4,440 ml removed by Dr. Colletta Das     Family History   Problem Relation Age of Onset    Breast Cancer Mother     Heart Disease Father     Atrial Fibrillation Brother      Social History     Socioeconomic History    Marital status:      Spouse name: Not on file    Number of children: Not on file    Years of education: Not on file    Highest education level: Not on file   Occupational History    Not on file   Social Needs    Financial resource strain: Not on file    Food insecurity     Worry: Never true     Inability: Never true   [employer] needs  ondansetron (ZOFRAN) injection 4 mg  4 mg Intravenous Q6H PRN Reza Smalls MD   4 mg at 02/04/21 0514    acetaminophen (TYLENOL) tablet 650 mg  650 mg Oral Q6H PRN Reza Smalls MD        Or    acetaminophen (TYLENOL) suppository 650 mg  650 mg Rectal Q6H PRN Reza Smalls MD        senna (SENOKOT) tablet 17.2 mg  2 tablet Oral Nightly Reza Smalls MD   17.2 mg at 02/02/21 2138    docusate sodium (COLACE) capsule 100 mg  100 mg Oral BID Reza Smalls MD   100 mg at 02/04/21 8395    bisacodyl (DULCOLAX) suppository 10 mg  10 mg Rectal Daily Reza Smalls MD   10 mg at 02/04/21 0265     Outpatient Medications Marked as Taking for the 2/1/21 encounter [facility] Encounter)   Medication Sig Dispense Refill    ondansetron (ZOFRAN) 4 MG tablet Take 1 tablet by mouth every 12 hours as needed for Nausea or Vomiting 30 tablet 0    calcium citrate-vitamin D (CITRICAL + D) 315-250 MG-UNIT TABS per tablet Take 2 tablets by mouth daily      Ascorbic Acid (VITAMIN C) 250 MG tablet Take 500 mg by mouth daily       Allergies   Allergen Reactions    Aspirin Other (See Comments)     GI issue         ROS:  Unremarkable except for symptoms mentioned in HPI. PHYSICAL EXAMINATION:   VITAL SIGNS: BP (!) 142/78   Pulse 97   Temp 97.2 °F (36.2 °C) (Oral)   Resp 18   Ht 5' 5\" (1.651 m)   Wt 153 lb 14.4 oz (69.8 kg)   SpO2 100%   BMI 25.61 kg/m²         GENERAL: In no acute distress, well- nourished, well- developed, alert and oriented to person place and time. SKIN: Warm and dry, without jaundice, ecchymoses, or petechiae. HEENT: Normocephalic, sclera anicteric, oral mucosa moist without lesion or exudate in the visible oral cavity or oropharynx, tongue mid-line with good mobility and no deviation with extension. NECK supple; no JVD; no thyromegaly  NODES: No palpable adenopathy in the neck Levels I-V, bilateral supraclavicular fossae, axillary chains, or inguinal regions. LUNGS: Good inspiratory effort, no accessory muscle use, clear bilaterally, no focal wheeze, rales or rhonchi. CARDIAC: Normal HS; regular rhythm;   ABDOMINAL: High-pitched bowel sounds present,distended non-tender, no hepatomegaly;   +splenomegaly with spleen tip palpable 5 cm BLCM  MUSKL: no tenderness over spine or ribs  EXTREMITIES: trace bipedal edema; no calf tenderness  NEUROLOGIC: Gait not tested,  No grossly apparent focal deficits.   PSYCH: cooperative; pt has appropriate behavior and affect    LAB RESULTS:  Recent Results (from the past 24 hour(s))   CBC auto differential    Collection Time: 02/04/21  6:08 AM   Result Value Ref Range    WBC 12.1 (H) 4.8 - 10.8 K/uL    RBC 5.00 4.20 - 5.40 M/uL    Hemoglobin 14.5 12.0 - 16.0 g/dL    Hematocrit 44.2 37.0 - 47.0 %    MCV 88.4 82.0 - 100.0 fL    MCH 29.0 27.0 - 31.3 pg    MCHC 32.8 (L) 33.0 - 37.0 %    RDW 13.0 11.5 - 14.5 %    Platelets 540 (H) 165 - 400 K/uL    PLATELET SLIDE REVIEW Increased     Neutrophils % 76.6 %    Lymphocytes % 12.9 %    Monocytes % 9.7 %    Eosinophils % 0.3 %    Basophils % 0.5 %    Neutrophils Absolute 9.2 (H) 1.4 - 6.5 K/uL    Lymphocytes Absolute 1.6 1.0 - 4.8 K/uL    Monocytes Absolute 1.2 (H) 0.2 - 0.8 K/uL    Eosinophils Absolute 0.0 0.0 - 0.7 K/uL    Basophils Absolute 0.1 0.0 - 0.2 K/uL    RBC Morphology Normal    Basic Metabolic Panel w/ Reflex to MG    Collection Time: 02/04/21  6:08 AM   Result Value Ref Range    Sodium 135 135 - 144 mEq/L    Potassium reflex Magnesium 4.4 3.4 - 4.9 mEq/L    Chloride 97 95 - 107 mEq/L    CO2 23 20 - 31 mEq/L    Anion Gap 15 9 - 15 mEq/L    Glucose 140 (H) 70 - 99 mg/dL    BUN 20 8 - 23 mg/dL    CREATININE 0.87 0.50 - 0.90 mg/dL    GFR Non-African American >60.0 >60    GFR  >60.0 >60    Calcium 8.5 8.5 - 9.9 mg/dL     Recent Labs     02/01/21  1345 02/01/21  1345 02/04/21  0608   COLORU DARK YELLOW*  --   --    PHUR 5.5  --   --    CLARITYU Clear  --   -- SPECGRAV 1.065  --   --    LEUKOCYTESUR Negative  --   --    UROBILINOGEN 1.0  --   --    BILIRUBINUR MODERATE*  --   --    BLOODU Negative  --   --    GLUCOSE 191*   < > 140*    < > = values in this interval not displayed. RADIOLOGY RESULTS:  Echocardiogram Complete 2d With Doppler With Color    Result Date: 2/3/2021  Transthoracic Echocardiography Report (TTE)  Demographics  Patient Name    Shikha Bergman Gender               Female  Patient Number  [id number]       Race                                                  Ethnicity  Visit Number    [id number]      Room Number          D633  Corporate ID                   Date of Study        02/03/2021  Accession       [id number]     Referring Physician  Number  Date of Birth   1950     Sonographer          [address]  Age             69 year(s)     Interpreting         [facility])                                 Physician            Cardiology                                                      Eden Keen MD Procedure Type of Study  TTE procedure:ECHO COMPLETE 2D W/DOP W/COLOR. Procedure Date Date: 02/03/2021 Start: 08:33 AM Study Location: Portable Technical Quality: Poor visualization due to poor acoustical window. Indications:LVF. Patient Status: Routine Height: 65 inches Weight: 153 pounds BSA: 1.77 m^2 BMI: 25.46 kg/m^2  Conclusions  Summary  Very limited and technically difficult study. LV Function appears to be preserved. will need repeat at later time.  when more stable  Signature  ----------------------------------------------------------------  Electronically signed by Eden Keen MD(Interpreting  physician) on 02/03/2021 12:07 PM  ----------------------------------------------------------------    Us Pelvis Complete    Result Date: 2/3/2021 EXAMINATION:  US PELVIS COMPLETE CLINICAL HISTORY: Ascites. COMPARISONS:  NONE AVAILABLE TECHNIQUE:  Transabdominal ultrasound of the pelvis. FINDINGS:  The uterus is surgically absent. Both left and right ovaries are surgically absent. There is free fluid within the pelvis. FREE FLUID WITHIN THE PELVIS. EXAMINATION:  US DUP ABD PEL RETRO SCROT COMPLETE CLINICAL HISTORY: Abnormal CT scan. COMPARISONS:  NONE AVAILABLE TECHNIQUE:  Transabdominal ultrasound of the right upper quadrant with both duplex color ultrasound and spectral Doppler ultrasound with interrogation of the hepatic and portal venous system was performed. FINDINGS:  The visualized portion of the right lobe of the liver shows no focal parenchymal abnormalities. The hypoechoic area seen at the inferior medial aspect of the right lobe of liver on CT is not appreciated. Within the field-of-view there are findings of cholelithiasis. There is ascites present. The hepatic veins and portal veins are patent. Normal direction seen within the portal veins and hepatic vein. IMPRESSION:  1. ASCITES IS PRESENT WITHIN THE FIELD-OF-VIEW. 2. CHOLELITHIASIS. 3. UNREMARKABLE SONOGRAPHIC EXAMINATION OF THE HEPATIC AND PORTAL VEIN.     Ct Abdomen Pelvis W Iv Contrast Additional Contrast? None    Result Date: 2/1/2021 EXAM:  CT ABDOMEN PELVIS W IV CONTRAST History: Abdominal distention. Abdominal pain. Technique: Multiple contiguous axial images were obtained of the abdomen and pelvis from an level of the lung bases through the ischial tuberosities with IV contrast. Multiplanar reformats were obtained. Delayed images were obtained. Comparison: None available Findings: Small right pleural effusion. Bibasilar subsegmental atelectasis. Subtle nodularity of the liver. A 1 cm hypodense lesion within the right lobe of the liver is seen on axial series 2 image 29 and an ill-defined area of hypodensity within the inferior right lobe of the liver measuring approximately 2 cm is seen on axial  series 2 image 38. The gallbladder is physiologically distended and contains multiple small densities within the dependent portion. No gallbladder wall thickening is identified. The stomach, spleen, pancreas, and adrenal glands appear within normal limits. Small hiatal hernia. There is thickening and nodularity of the omentum. There is a large amount of ascites demonstrating simple fluid density. The kidneys enhance uniformly. There is mild right-sided hydronephrosis however no radiopaque or urinary tract calculi identified. No left-sided urinary tract calculi or hydronephrosis. Urinary bladder is suboptimally distended. There is mild perivesicular inflammation. The uterus is surgically absent Abdominal aorta is nonaneurysmal  . No retroperitoneal or abdominal/pelvic lymphadenopathy. No small bowel obstruction. No overt colonic mass or pericolonic inflammation although the large amount of ascites obscures evaluation for inflammation. There is a large amount of stool within the rectosigmoid colon. The appendix is not definitively visualized. No pneumoperitoneum or portal venous gas. No acute or aggressive osseous abnormality. Degenerative changes of the spine. 1.  Status post technically successful ultrasound-guided paracentesis. Corby Alicia is a Female of 69 years age, referred for Ultrasound Guided Paracentesis. PROCEDURE: Survey of the abdomen showed large amount of ascites fluid. After obtaining informed consent, the patient was positioned supine on the sonography table. Using ultrasound, the skin over the left hemiabdomen was locally anesthetized with 1% lidocaine. Following that, a Yueh needle was advanced into the fluid pocket using ultrasound visualization. 4440cc, of clear yellow fluid were aspirated and sent for cytology, and pathology. The needle was removed, and hemostasis was obtained with pressure. A Band-Aid was placed. Post procedure images did not demonstrate hemorrhage at the target site. The patient tolerated the procedure well. The patient left the department in good condition. A radiology nurse was in presence monitoring vital signs, assisting throughout the procedure. Xr Abdomen (2 Views)    Result Date: 1/7/2021  EXAMINATION: XR ABDOMEN (2 VIEWS) DATE AND TIME:1/7/2021 12:44 PM CLINICAL HISTORY: Acute abdominal pain R10.30 Lower abdominal pain ICD10 COMPARISONS: None available. FINDINGS  There are nondilated loops of large and small bowel identified, with normal solid organ outlines. There is no evidence of free air or significant air-fluid levels on the upright view. No signs of bowel obstruction. Visualized lung bases are clear. . There are no visible renal or ureteral stones. The pelvis there is a calcification in the left likely a vascular phleboliths. Moderate volume of fecal debris. NORMAL GAS DISTRIBUTION IN A NONOBSTRUCTIVE PATTERN AS DISCUSSED ABOVE. ASSESSMENT AND PLAN  1. Pt has ascites with peritoneal nodularity and elevated CA-125 level suspicious for abdominal carcinomatosis with malignant ascites from either ovarian cancer or possible primary peritoneal cancer. Final path report will be checked including cell block of ascitic fluid. If diagnosis of malignancy is confirmed, then pt will be considered for chemotx which can be done as out-pt. She will also be considered for referral to GYN oncologist . Also consider referral to [facility] for BRCA-1 and BRCA-2 testing as an out- pt.    2. Abd distension , constipation and N/V with high-pitched bowel sounds r/o bowel obstruction from her probable malignancy. 3. Possible liver lesions on Ct r/o mets vs cysts  Although not seen on liver ultrasound. Pt will be considered for possible MRI of liver . 4. Pt has palpable splenomegaly r/o portal HTN from possible liver cirrhosis but spleen was reported as  normal on CT. Thank you, Dr. Allen Guevara , for this consultation.        Electronically signed by Suhas Metz MD on 2/4/2021 at 11:28 AM

## 2021-02-04 NOTE — PROGRESS NOTES
Hospitalist Progress Note      PCP: Marvin Sherwood MD    Date of Admission: 2/1/2021    Chief Complaint:    Chief Complaint   Patient presents with    Fatigue     sent by dr Norman Foss for dehydration work up , patient has been vomiting and not keeping anything down and is very fatigued     Subjective: The patient feels nauseous today; denies fevers, chills, sweats; threw up from just a little tea. 12 point ROS negative other than mentioned above     Medications:  Reviewed    Infusion Medications    dextrose 5% in lactated ringers 100 mL/hr at 02/04/21 1447     Scheduled Medications    spironolactone  50 mg Oral Daily    furosemide  20 mg Oral Daily    sodium chloride flush  10 mL Intravenous Once    sodium chloride flush  10 mL Intravenous 2 times per day    enoxaparin  40 mg Subcutaneous Daily    senna  2 tablet Oral Nightly    docusate sodium  100 mg Oral BID    bisacodyl  10 mg Rectal Daily     PRN Meds: sodium chloride flush, promethazine **OR** ondansetron, acetaminophen **OR** acetaminophen      Intake/Output Summary (Last 24 hours) at 2/4/2021 1452  Last data filed at 2/4/2021 1014  Gross per 24 hour   Intake 850 ml   Output 100 ml   Net 750 ml     Exam:    BP (!) 142/78   Pulse 97   Temp 97.2 °F (36.2 °C) (Oral)   Resp 18   Ht 5' 5\" (1.651 m)   Wt 153 lb 14.4 oz (69.8 kg)   SpO2 100%   BMI 25.61 kg/m²     General appearance: No apparent distress, appears stated age and cooperative. HEENT: Conjunctivae/corneas clear. Neck:  Trachea midline. Respiratory:  Normal respiratory effort. Clear to auscultation  Cardiovascular: Regular rate and rhythm   Abdomen: Distended, tympanic, dullness in the flanks  Musculoskeletal: No clubbing, cyanosis or edema bilaterally.     Neuro: Non Focal  Capillary Refill: Brisk,< 3 seconds   Peripheral Pulses: +2 palpable, equal bilaterally     Labs:   Recent Labs     02/02/21  0607 02/03/21  0611 02/04/21  0608   WBC 10.9* 11.2* 12.1*   HGB 14.0 14.2 14.5 HCT 41.7 42.5 44.2    403* 448*     Recent Labs     02/02/21  0607 02/03/21  0611 02/04/21  0608   * 138 135   K 3.7 3.2* 4.4   CL 96 100 97   CO2 22 24 23   BUN 21 18 20   CREATININE 0.80 0.72 0.87   CALCIUM 8.6 8.5 8.5     Recent Labs     02/02/21  0607   AST 32   ALT 38*   BILIDIR 0.3   BILITOT 0.6   ALKPHOS 139*     No results for input(s): INR in the last 72 hours. No results for input(s): Lyndee Maninder in the last 72 hours. Urinalysis:      Lab Results   Component Value Date    NITRU Negative 02/01/2021    BLOODU Negative 02/01/2021    SPECGRAV 1.065 02/01/2021    GLUCOSEU Negative 02/01/2021       Radiology:  XR ABDOMEN (KUB) (SINGLE AP VIEW)   Final Result   DISTENDED DILATED LOOPS OF LARGE BOWEL WITH A TRANSITION POINT IN THE REGION OF THE DESCENDING COLON, SIGMOID. FINDINGS COULD SUGGEST THAT OF POSSIBLE HIGH-GRADE OBSTRUCTION. FURTHER EVALUATION IS WARRANTED. US PELVIS COMPLETE   Preliminary Result   FREE FLUID WITHIN THE PELVIS. EXAMINATION:  US DUP ABD PEL RETRO SCROT COMPLETE      CLINICAL HISTORY: Abnormal CT scan. COMPARISONS:  NONE AVAILABLE      TECHNIQUE:  Transabdominal ultrasound of the right upper quadrant with both duplex color ultrasound and spectral Doppler ultrasound with interrogation of the hepatic and portal venous system was performed. FINDINGS:     The visualized portion of the right lobe of the liver shows no focal parenchymal abnormalities. The hypoechoic area seen at the inferior medial aspect of the right lobe of liver on CT is not appreciated. Within the field-of-view there are findings of cholelithiasis. There is ascites present. The hepatic veins and portal veins are patent. Normal direction seen within the portal veins and hepatic vein. IMPRESSION:        1. ASCITES IS PRESENT WITHIN THE FIELD-OF-VIEW. 2. CHOLELITHIASIS. 3. UNREMARKABLE SONOGRAPHIC EXAMINATION OF THE HEPATIC AND PORTAL VEIN. US DUP ABD PEL RETRO SCROT COMPLETE   Preliminary Result   FREE FLUID WITHIN THE PELVIS. EXAMINATION:  US DUP ABD PEL RETRO SCROT COMPLETE      CLINICAL HISTORY: Abnormal CT scan. COMPARISONS:  NONE AVAILABLE      TECHNIQUE:  Transabdominal ultrasound of the right upper quadrant with both duplex color ultrasound and spectral Doppler ultrasound with interrogation of the hepatic and portal venous system was performed. FINDINGS:     The visualized portion of the right lobe of the liver shows no focal parenchymal abnormalities. The hypoechoic area seen at the inferior medial aspect of the right lobe of liver on CT is not appreciated. Within the field-of-view there are findings of cholelithiasis. There is ascites present. The hepatic veins and portal veins are patent. Normal direction seen within the portal veins and hepatic vein. IMPRESSION:        1. ASCITES IS PRESENT WITHIN THE FIELD-OF-VIEW. 2. CHOLELITHIASIS. 3. UNREMARKABLE SONOGRAPHIC EXAMINATION OF THE HEPATIC AND PORTAL VEIN. US GUIDED PARACENTESIS   Final Result   1. Status post technically successful ultrasound-guided paracentesis. Mercedes Richardson is a Female of 69 years age, referred for Ultrasound Guided Paracentesis. PROCEDURE: Survey of the abdomen showed large amount of ascites fluid. After obtaining informed consent, the patient was positioned supine on the sonography table. Using ultrasound, the skin over the left hemiabdomen was locally anesthetized with 1% lidocaine. Following that, a Yueh needle was advanced into the fluid    pocket using ultrasound visualization. 4440cc, of clear yellow fluid were aspirated and sent for cytology, and pathology. The needle was removed, and hemostasis was obtained with pressure. A Band-Aid was placed. Post procedure images did not demonstrate hemorrhage at the target site. The patient tolerated the procedure well. 3. Right sided pleural effusion:  Likely from the ascites; ascites drained  4. Functional Status: Fall precautions. Up with assistance. PT OT  5. Diet: NPO  6. DVT ppx: Lovenox SCDs  7. Disposition: Dependent on hospital course. Will discharge once medically stable. SW on board for discharge planning. Active Hospital Problems    Diagnosis Date Noted    Fatigue [R53.83]     Malignant ascites [R18.0] 02/01/2021     Additional work up or/and treatment plan may be added today or then after based on clinical progression. I am managing a portion of pt care. Some medical issues are handled by other specialists. Additional work up and treatment should be done in out pt setting by pt PCP and other out pt providers. In addition to examining and evaluating pt, I spent additional time explaining care, normal and abnormal findings, and treatment plan. All of pt questions were answered. Counseling, diet and education were  provided. Case will be discussed with nursing staff when appropriate. Family will be updated if and when appropriate.       Diet: Diet NPO Effective Now    Code Status: Full Code    PT/OT Eval     Electronically signed by Howard Dailey MD on 2/4/2021 at 2:52 PM

## 2021-02-04 NOTE — CONSULTS
Department of General Surgery - Adult  Surgical Service General surgery  Attending Consult Note      Reason for Consult: Malignant ascites      CHIEF COMPLAINT: Abdominal distention    History Obtained From:  patient, electronic medical record    HISTORY OF PRESENT ILLNESS:                The patient is a 69 y.o. female who presents with abdominal distention and ascites over the past 3 weeks. She has been having generalized fatigue which brought her in the hospital.  Her abdomen was quite distended with ascites and she had a paracentesis with over 4 L. Cytology was suspicious for malignancy. CT scan was suggestive of peritoneal carcinomatosis. She has been having issues with nausea. There was worries regarding a bowel obstruction. A nasogastric tube was placed prior to my consultation which had minimal output. Cytology has not returned with final diagnosis. Her CEA and CA-125 were elevated. Patient has not had a colonoscopy her whole life. She denies any family history of colon cancer. She denies any rectal bleeding. She has had weight loss over the past few months. I reviewed her CAT scan as well as recent abdominal x-ray.     Past Medical History:        Diagnosis Date    Anxiety     panic attacks    Fibroids 1998    has complete hysterctomy    Kidney stone     14 years ago     Past Surgical History:        Procedure Laterality Date    HYSTERECTOMY, TOTAL ABDOMINAL      PARACENTESIS Left 02/02/2021    4,440 ml removed by Dr. Richard Liu     Current Medications:   Current Facility-Administered Medications: spironolactone (ALDACTONE) tablet 50 mg, 50 mg, Oral, Daily  furosemide (LASIX) tablet 20 mg, 20 mg, Oral, Daily  dextrose 5 % in lactated ringers infusion, , Intravenous, Continuous  sodium chloride flush 0.9 % injection 10 mL, 10 mL, Intravenous, Once  sodium chloride flush 0.9 % injection 10 mL, 10 mL, Intravenous, 2 times per day sodium chloride flush 0.9 % injection 10 mL, 10 mL, Intravenous, PRN  enoxaparin (LOVENOX) injection 40 mg, 40 mg, Subcutaneous, Daily  promethazine (PHENERGAN) tablet 12.5 mg, 12.5 mg, Oral, Q6H PRN **OR** ondansetron (ZOFRAN) injection 4 mg, 4 mg, Intravenous, Q6H PRN  acetaminophen (TYLENOL) tablet 650 mg, 650 mg, Oral, Q6H PRN **OR** acetaminophen (TYLENOL) suppository 650 mg, 650 mg, Rectal, Q6H PRN  senna (SENOKOT) tablet 17.2 mg, 2 tablet, Oral, Nightly  docusate sodium (COLACE) capsule 100 mg, 100 mg, Oral, BID  bisacodyl (DULCOLAX) suppository 10 mg, 10 mg, Rectal, Daily  Allergies:  Aspirin    Social History:   TOBACCO:   reports that she has never smoked. She has never used smokeless tobacco.  ETOH:   reports current alcohol use. DRUGS:   reports no history of drug use. Family History:       Problem Relation Age of Onset    Breast Cancer Mother     Heart Disease Father     Atrial Fibrillation Brother        REVIEW OF SYSTEMS:    CONSTITUTIONAL:  positive for  fatigue and malaise and weight loss   EYES:  negative  HEENT:  negative  RESPIRATORY:  negative  CARDIOVASCULAR:  negative  GASTROINTESTINAL:  positive for change in bowel habits and abdominal distention  MUSCULOSKELETAL:  negative  NEUROLOGICAL:  negative  BEHAVIOR/PSYCH:  negative    PHYSICAL EXAM:    VITALS:  BP (!) 142/78   Pulse 97   Temp 97.2 °F (36.2 °C) (Oral)   Resp 18   Ht 5' 5\" (1.651 m)   Wt 153 lb 14.4 oz (69.8 kg)   SpO2 100%   BMI 25.61 kg/m²   CONSTITUTIONAL:  awake, alert, cooperative, no apparent distress, and appears stated age  EYES:  Lids and lashes normal, pupils equal, round and reactive to light, extra ocular muscles intact, sclera clear, conjunctiva normal  ENT:  Normocephalic, without obvious abnormality, atraumatic, sinuses nontender on palpation, external ears without lesions, oral pharynx with moist mucus membranes, tonsils without erythema or exudates, gums normal and good dentition. NECK:  Supple, symmetrical, trachea midline, no adenopathy, thyroid symmetric, not enlarged and no tenderness, skin normal  HEMATOLOGIC/LYMPHATICS:  no cervical lymphadenopathy  BACK:  Symmetric, no curvature, spinous processes are non-tender on palpation, paraspinous muscles are non-tender on palpation, no costal vertebral tenderness  LUNGS:  No increased work of breathing, good air exchange, clear to auscultation bilaterally, no crackles or wheezing  CARDIOVASCULAR:  Normal apical impulse, regular rate and rhythm, normal S1 and S2, no S3 or S4, and no murmur noted  ABDOMEN: Abdomen distended previous surgical incisions well-healed no abdominal wall hernias  MUSCULOSKELETAL:  There is no redness, warmth, or swelling of the joints. Full range of motion noted. Motor strength is 5 out of 5 all extremities bilaterally. Tone is normal.  NEUROLOGIC: Awake alert and oriented  SKIN:  no bruising or bleeding  DATA:    CBC:   Lab Results   Component Value Date    WBC 12.1 02/04/2021    RBC 5.00 02/04/2021    HGB 14.5 02/04/2021    HCT 44.2 02/04/2021    MCV 88.4 02/04/2021    MCH 29.0 02/04/2021    MCHC 32.8 02/04/2021    RDW 13.0 02/04/2021     02/04/2021     CMP:    Lab Results   Component Value Date     02/04/2021    K 4.4 02/04/2021    CL 97 02/04/2021    CO2 23 02/04/2021    BUN 20 02/04/2021    CREATININE 0.87 02/04/2021    GFRAA >60.0 02/04/2021    LABGLOM >60.0 02/04/2021    GLUCOSE 140 02/04/2021    PROT 5.9 02/02/2021    LABALBU 3.1 02/02/2021    CALCIUM 8.5 02/04/2021    BILITOT 0.6 02/02/2021    ALKPHOS 139 02/02/2021    AST 32 02/02/2021    ALT 38 02/02/2021     CEA CA-125 both reviewed and elevated  Radiology Review: CT scan reviewed. IMPRESSION/RECOMMENDATIONS:      Malignant ascites. Most common causes of malignant ascites is ovarian, colorectal, breast, and lung. Can await cytology but most likely cause is colorectal.  On looking at the scan there appears to be a transition point near the descending/sigmoid junction which I suspect might be a near obstructing cancer. I do not believe she has a small bowel obstruction and do not think the nasogastric tube is going to help her considerably. We can leave it in if we like to proceed with any type of small bowel follow-through but given the suggestion of a possible large bowel obstruction at the level of the distal descending/sigmoid junction, I would not put a large amount of volume into her intestines concerned about this obstruction. Best approach would be enemas followed by flexible sigmoidoscopy. Colonic stenting could be used to bypass any obstructing lesions. I am happy to assist with diagnosis and possible palliative stenting. Will await gastroenterology recommendations.

## 2021-02-04 NOTE — PROGRESS NOTES
Spiritual Care Services     Summary of Visit:      Spiritual Assessment/Intervention/Outcomes:    Encounter Summary  Services provided to[de-identified] Patient  Referral/Consult From[de-identified] Rounding  Support System: Spouse  Continue Visiting: No  Complexity of Encounter: Low  Length of Encounter: 15 minutes  Spiritual Assessment Completed: Yes  Routine  Type: Initial  Assessment: Approachable, Sleeping  Intervention: [place], Sustaining presence/ Ministry of presence  Outcome: Receptive              Advance Directives (For Healthcare)  Pre-existing DNR Comfort Care/DNR Arrest/DNI Order: No  Healthcare Directive: No, patient does not have an advance directive for healthcare treatment  Information on Healthcare Directives Requested: No  Patient Requests Assistance: No  Advance Directives: Pt. not interested at this time           Values / Beliefs  Do you have any ethnic, cultural, sacramental, or spiritual [employer]ism needs you would like us to be aware of while you are in the hospital?: No    Care Plan:        [id number] [address]   Electronically signed by Cat Hunt on 2/4/21 at 3:23 PM EST     To reach a  for emotional and spiritual support, place an [facility] consult request.   If a  is needed immediately, dial 0 and ask to page the on-call .

## 2021-02-04 NOTE — PROGRESS NOTES
Patient assessed and charted on by this RN. Vital signs are stable. Patient denies pain but states having nausea. Zofran administered. Patient has been dry heaving and complaining of feeling sick. Denies any more medications to help with nausea due to them being \"IM\". Patient is A&Ox4. Stomach is distended and bowel sounds active. Fall precautions are in place. Call light in reach. Will continue to monitor. 0646 - Patient stated Reglan helped patient sip water throughout night and sleep comfortably. Pt states drank a cup and a half throughout the night and it stayed down. Patient only urinated 100ml for the night. NP notified. Zofran administered. Patient did walk to the bathroom standby assist. Denies any further needs at this time.

## 2021-02-04 NOTE — PROGRESS NOTES
Julienne Burton is a 69 y.o. female patient. Current Facility-Administered Medications   Medication Dose Route Frequency Provider Last Rate Last Admin    spironolactone (ALDACTONE) tablet 50 mg  50 mg Oral Daily Krystle Alvarenga MD        furosemide (LASIX) tablet 20 mg  20 mg Oral Daily Krystle Alvarenga MD        dextrose 5 % in lactated ringers infusion   Intravenous Continuous Krystle Alvarenga  mL/hr at 02/04/21 1447 New Bag at 02/04/21 1447    sodium chloride flush 0.9 % injection 10 mL  10 mL Intravenous Once Angela O Evanman, DO        sodium chloride flush 0.9 % injection 10 mL  10 mL Intravenous 2 times per day Krystle Alvarenga MD   10 mL at 02/03/21 1947    sodium chloride flush 0.9 % injection 10 mL  10 mL Intravenous PRN Krystle Alvarenga MD        enoxaparin (LOVENOX) injection 40 mg  40 mg Subcutaneous Daily Krystle Alvarenga MD   40 mg at 02/04/21 0903    promethazine (PHENERGAN) tablet 12.5 mg  12.5 mg Oral Q6H PRN Krystle Alvarenga MD        Or    ondansetron (ZOFRAN) injection 4 mg  4 mg Intravenous Q6H PRN Krystle Alvarenga MD   4 mg at 02/04/21 0514    acetaminophen (TYLENOL) tablet 650 mg  650 mg Oral Q6H PRN Krystle Alvarenga MD        Or    acetaminophen (TYLENOL) suppository 650 mg  650 mg Rectal Q6H PRN Krystle Alvarenga MD        senna (SENOKOT) tablet 17.2 mg  2 tablet Oral Nightly Krystle Alvarenga MD   17.2 mg at 02/02/21 2138    docusate sodium (COLACE) capsule 100 mg  100 mg Oral BID Krystle Alvarenga MD   100 mg at 02/04/21 2948    bisacodyl (DULCOLAX) suppository 10 mg  10 mg Rectal Daily Krystle Alvarenga MD   10 mg at 02/04/21 3116     Allergies   Allergen Reactions    Aspirin Other (See Comments)     GI issue     Active Problems:    Malignant ascites    Fatigue  Resolved Problems:    * No resolved hospital problems.  * Blood pressure (!) 142/78, pulse 97, temperature 97.2 °F (36.2 °C), temperature source Oral, resp. rate 18, height 5' 5\" (1.651 m), weight 153 lb 14.4 oz (69.8 kg), SpO2 100 %, not currently breastfeeding. Subjective has abdominal discomfort with distention. ,  A nasogastric tube was placed with return of light brown fluid, having some BM. KUB showed dilated loops of bowel with a possible transition point in the descending or sigmoid colonObjective KUB showed dilated loops of bowel with a possible transition point in the descending or sigmoid colon, patient has abnormal CA 19-9 and CA-125, pelvic ultrasound showed absence of ovary. Acetic fluid cytology still pending  Assessment & Plan possible malignant ascites etiology unclear, GI neoplasm is a possibility. Will try sigmoidoscopy a.m. to rule out any colonic pathology, prepped the patient with enemas.     Felicia Lux MD  2/4/2021

## 2021-02-05 ENCOUNTER — ANESTHESIA (OUTPATIENT)
Dept: ENDOSCOPY | Age: 71
DRG: 981 | End: 2021-02-05
Payer: MEDICARE

## 2021-02-05 ENCOUNTER — ANCILLARY PROCEDURE (OUTPATIENT)
Dept: ENDOSCOPY | Age: 71
DRG: 981 | End: 2021-02-05
Payer: MEDICARE

## 2021-02-05 ENCOUNTER — ANESTHESIA EVENT (OUTPATIENT)
Dept: ENDOSCOPY | Age: 71
DRG: 981 | End: 2021-02-05
Payer: MEDICARE

## 2021-02-05 VITALS
SYSTOLIC BLOOD PRESSURE: 118 MMHG | RESPIRATION RATE: 13 BRPM | OXYGEN SATURATION: 99 % | DIASTOLIC BLOOD PRESSURE: 81 MMHG

## 2021-02-05 LAB
ANION GAP SERPL CALCULATED.3IONS-SCNC: 12 MEQ/L (ref 9–15)
ANTINUCLEAR AB INTERPRETIVE COMMENT: NORMAL
ANTINUCLEAR ANTIBODY, HEP-2, IGG: NORMAL
BASOPHILS ABSOLUTE: 0 K/UL (ref 0–0.2)
BASOPHILS RELATIVE PERCENT: 0.4 %
BODY FLUID CULTURE, STERILE: NORMAL
BUN BLDV-MCNC: 21 MG/DL (ref 8–23)
CALCIUM SERPL-MCNC: 8.4 MG/DL (ref 8.5–9.9)
CHLORIDE BLD-SCNC: 98 MEQ/L (ref 95–107)
CO2: 27 MEQ/L (ref 20–31)
CREAT SERPL-MCNC: 0.81 MG/DL (ref 0.5–0.9)
EOSINOPHILS ABSOLUTE: 0 K/UL (ref 0–0.7)
EOSINOPHILS RELATIVE PERCENT: 0.5 %
F-ACTIN AB IGA: 8.7 UNITS (ref 0–24.9)
GFR AFRICAN AMERICAN: >60
GFR NON-AFRICAN AMERICAN: >60
GLUCOSE BLD-MCNC: 196 MG/DL (ref 70–99)
HCT VFR BLD CALC: 41.8 % (ref 37–47)
HEMOGLOBIN: 14.1 G/DL (ref 12–16)
LYMPHOCYTES ABSOLUTE: 1.3 K/UL (ref 1–4.8)
LYMPHOCYTES RELATIVE PERCENT: 12.8 %
MAGNESIUM: 2.2 MG/DL (ref 1.7–2.4)
MCH RBC QN AUTO: 29.2 PG (ref 27–31.3)
MCHC RBC AUTO-ENTMCNC: 33.6 % (ref 33–37)
MCV RBC AUTO: 87 FL (ref 82–100)
MONOCYTES ABSOLUTE: 1.1 K/UL (ref 0.2–0.8)
MONOCYTES RELATIVE PERCENT: 10.4 %
NEUTROPHILS ABSOLUTE: 7.8 K/UL (ref 1.4–6.5)
NEUTROPHILS RELATIVE PERCENT: 75.9 %
PDW BLD-RTO: 12.8 % (ref 11.5–14.5)
PLATELET # BLD: 425 K/UL (ref 130–400)
POTASSIUM REFLEX MAGNESIUM: 3.4 MEQ/L (ref 3.4–4.9)
RBC # BLD: 4.81 M/UL (ref 4.2–5.4)
SODIUM BLD-SCNC: 137 MEQ/L (ref 135–144)
WBC # BLD: 10.3 K/UL (ref 4.8–10.8)

## 2021-02-05 PROCEDURE — 36415 COLL VENOUS BLD VENIPUNCTURE: CPT

## 2021-02-05 PROCEDURE — 2709999900 HC NON-CHARGEABLE SUPPLY: Performed by: SPECIALIST

## 2021-02-05 PROCEDURE — 7100000011 HC PHASE II RECOVERY - ADDTL 15 MIN: Performed by: SPECIALIST

## 2021-02-05 PROCEDURE — 1210000000 HC MED SURG R&B

## 2021-02-05 PROCEDURE — 2580000003 HC RX 258

## 2021-02-05 PROCEDURE — 2580000003 HC RX 258: Performed by: SPECIALIST

## 2021-02-05 PROCEDURE — 3700000000 HC ANESTHESIA ATTENDED CARE: Performed by: SPECIALIST

## 2021-02-05 PROCEDURE — 6360000002 HC RX W HCPCS

## 2021-02-05 PROCEDURE — 2580000003 HC RX 258: Performed by: INTERNAL MEDICINE

## 2021-02-05 PROCEDURE — 83735 ASSAY OF MAGNESIUM: CPT

## 2021-02-05 PROCEDURE — 6360000002 HC RX W HCPCS: Performed by: INTERNAL MEDICINE

## 2021-02-05 PROCEDURE — 85025 COMPLETE CBC W/AUTO DIFF WBC: CPT

## 2021-02-05 PROCEDURE — 2500000003 HC RX 250 WO HCPCS

## 2021-02-05 PROCEDURE — 3700000001 HC ADD 15 MINUTES (ANESTHESIA): Performed by: SPECIALIST

## 2021-02-05 PROCEDURE — 45330 DIAGNOSTIC SIGMOIDOSCOPY: CPT | Performed by: SPECIALIST

## 2021-02-05 PROCEDURE — 80048 BASIC METABOLIC PNL TOTAL CA: CPT

## 2021-02-05 PROCEDURE — 3609008300 HC SIGMOIDOSCOPY W/BIOPSY SINGLE/MULTIPLE: Performed by: SPECIALIST

## 2021-02-05 PROCEDURE — 7100000010 HC PHASE II RECOVERY - FIRST 15 MIN: Performed by: SPECIALIST

## 2021-02-05 RX ORDER — LORAZEPAM 2 MG/ML
0.5 INJECTION INTRAMUSCULAR ONCE
Status: COMPLETED | OUTPATIENT
Start: 2021-02-05 | End: 2021-02-05

## 2021-02-05 RX ORDER — LIDOCAINE HYDROCHLORIDE 10 MG/ML
1 INJECTION, SOLUTION EPIDURAL; INFILTRATION; INTRACAUDAL; PERINEURAL
Status: ACTIVE | OUTPATIENT
Start: 2021-02-05 | End: 2021-02-05

## 2021-02-05 RX ORDER — LIDOCAINE HYDROCHLORIDE 20 MG/ML
INJECTION, SOLUTION EPIDURAL; INFILTRATION; INTRACAUDAL; PERINEURAL PRN
Status: DISCONTINUED | OUTPATIENT
Start: 2021-02-05 | End: 2021-02-05 | Stop reason: SDUPTHER

## 2021-02-05 RX ORDER — SODIUM CHLORIDE 9 MG/ML
INJECTION, SOLUTION INTRAVENOUS CONTINUOUS PRN
Status: DISCONTINUED | OUTPATIENT
Start: 2021-02-05 | End: 2021-02-05 | Stop reason: SDUPTHER

## 2021-02-05 RX ORDER — SODIUM CHLORIDE 0.9 % (FLUSH) 0.9 %
10 SYRINGE (ML) INJECTION PRN
Status: DISCONTINUED | OUTPATIENT
Start: 2021-02-05 | End: 2021-02-08 | Stop reason: HOSPADM

## 2021-02-05 RX ORDER — SODIUM CHLORIDE 9 MG/ML
INJECTION, SOLUTION INTRAVENOUS CONTINUOUS
Status: DISCONTINUED | OUTPATIENT
Start: 2021-02-05 | End: 2021-02-06

## 2021-02-05 RX ORDER — MAGNESIUM HYDROXIDE 1200 MG/15ML
LIQUID ORAL PRN
Status: DISCONTINUED | OUTPATIENT
Start: 2021-02-05 | End: 2021-02-05 | Stop reason: ALTCHOICE

## 2021-02-05 RX ORDER — LORAZEPAM 2 MG/ML
INJECTION INTRAMUSCULAR
Status: COMPLETED
Start: 2021-02-05 | End: 2021-02-05

## 2021-02-05 RX ORDER — GLYCOPYRROLATE 1 MG/5 ML
SYRINGE (ML) INTRAVENOUS PRN
Status: DISCONTINUED | OUTPATIENT
Start: 2021-02-05 | End: 2021-02-05 | Stop reason: SDUPTHER

## 2021-02-05 RX ORDER — 0.9 % SODIUM CHLORIDE 0.9 %
500 INTRAVENOUS SOLUTION INTRAVENOUS ONCE
Status: COMPLETED | OUTPATIENT
Start: 2021-02-05 | End: 2021-02-05

## 2021-02-05 RX ORDER — SODIUM CHLORIDE 0.9 % (FLUSH) 0.9 %
10 SYRINGE (ML) INJECTION EVERY 12 HOURS SCHEDULED
Status: DISCONTINUED | OUTPATIENT
Start: 2021-02-05 | End: 2021-02-08 | Stop reason: HOSPADM

## 2021-02-05 RX ORDER — PROPOFOL 10 MG/ML
INJECTION, EMULSION INTRAVENOUS PRN
Status: DISCONTINUED | OUTPATIENT
Start: 2021-02-05 | End: 2021-02-05 | Stop reason: SDUPTHER

## 2021-02-05 RX ORDER — ONDANSETRON 2 MG/ML
4 INJECTION INTRAMUSCULAR; INTRAVENOUS
Status: ACTIVE | OUTPATIENT
Start: 2021-02-05 | End: 2021-02-05

## 2021-02-05 RX ADMIN — LIDOCAINE HYDROCHLORIDE 40 MG: 20 INJECTION, SOLUTION EPIDURAL; INFILTRATION; INTRACAUDAL; PERINEURAL at 14:06

## 2021-02-05 RX ADMIN — SODIUM CHLORIDE 500 ML: 9 INJECTION, SOLUTION INTRAVENOUS at 13:58

## 2021-02-05 RX ADMIN — LORAZEPAM 0.5 MG: 2 INJECTION INTRAMUSCULAR; INTRAVENOUS at 13:21

## 2021-02-05 RX ADMIN — PROPOFOL 160 MG: 10 INJECTION, EMULSION INTRAVENOUS at 14:06

## 2021-02-05 RX ADMIN — Medication 10 ML: at 22:16

## 2021-02-05 RX ADMIN — Medication 0.2 MG: at 14:14

## 2021-02-05 RX ADMIN — SODIUM CHLORIDE: 9 INJECTION, SOLUTION INTRAVENOUS at 14:02

## 2021-02-05 RX ADMIN — ONDANSETRON 4 MG: 2 INJECTION INTRAMUSCULAR; INTRAVENOUS at 23:41

## 2021-02-05 RX ADMIN — LORAZEPAM 0.5 MG: 2 INJECTION INTRAMUSCULAR at 13:21

## 2021-02-05 RX ADMIN — SODIUM CHLORIDE, SODIUM LACTATE, POTASSIUM CHLORIDE, CALCIUM CHLORIDE AND DEXTROSE MONOHYDRATE: 5; 600; 310; 30; 20 INJECTION, SOLUTION INTRAVENOUS at 00:36

## 2021-02-05 ASSESSMENT — PAIN SCALES - GENERAL
PAINLEVEL_OUTOF10: 0
PAINLEVEL_OUTOF10: 0

## 2021-02-05 NOTE — ANESTHESIA POSTPROCEDURE EVALUATION
Department of Anesthesiology  Postprocedure Note    Patient: Nancy Harris  MRN: [id number]  YOB: 1950  Date of evaluation: 2/5/2021  Time:  2:26 PM     Procedure Summary     Date: 02/05/21 Room / Location: [address] [place] [address]    Anesthesia Start: 1402 Anesthesia Stop: 9533    Procedure: [facility] [place] (N/A ) Diagnosis: (colon mass)    Surgeons: Ney Sanchez MD Responsible Provider: ALBERTINA Berrios CRNA    Anesthesia Type: MAC ASA Status: 3          Anesthesia Type: MAC    Stevie Phase I: Stevie Score: 10    Stevie Phase II:      Last vitals: Reviewed and per EMR flowsheets.        Anesthesia Post Evaluation    Patient location during evaluation: bedside  Patient participation: waiting for patient participation  Level of consciousness: sleepy but conscious  Pain score: 0  Airway patency: patent  Nausea & Vomiting: no nausea and no vomiting  Complications: no  Cardiovascular status: blood pressure returned to baseline and hemodynamically stable  Respiratory status: acceptable and nasal cannula  Hydration status: euvolemic

## 2021-02-05 NOTE — PROGRESS NOTES
Hospitalist Progress Note      PCP: Amalia Saunders MD    Date of Admission: 2/1/2021    Chief Complaint:    Chief Complaint   Patient presents with    Fatigue     sent by dr Anabella Pena for dehydration work up , patient has been vomiting and not keeping anything down and is very fatigued     Subjective: The patient has her NG in place; is in distress and stressed. 12 point ROS negative other than mentioned above     Medications:  Reviewed    Infusion Medications    sodium chloride      dextrose 5% in lactated ringers 100 mL/hr at 02/05/21 0036     Scheduled Medications    sodium chloride flush  10 mL Intravenous 2 times per day    sodium chloride  500 mL Intravenous Once    sodium chloride flush  10 mL Intravenous 2 times per day    spironolactone  50 mg Oral Daily    furosemide  20 mg Oral Daily    sodium chloride flush  10 mL Intravenous Once    sodium chloride flush  10 mL Intravenous 2 times per day    enoxaparin  40 mg Subcutaneous Daily    senna  2 tablet Oral Nightly    docusate sodium  100 mg Oral BID    bisacodyl  10 mg Rectal Daily     PRN Meds: sodium chloride flush, sterile water for irrigation, ondansetron, sodium chloride flush, lidocaine PF, sodium chloride flush, promethazine **OR** ondansetron, acetaminophen **OR** acetaminophen      Intake/Output Summary (Last 24 hours) at 2/5/2021 1507  Last data filed at 2/5/2021 1433  Gross per 24 hour   Intake 1899 ml   Output 100 ml   Net 1799 ml     Exam:    /85   Pulse 96   Temp 97.1 °F (36.2 °C)   Resp 16   Ht 5' 5\" (1.651 m)   Wt 150 lb (68 kg)   SpO2 99%   BMI 24.96 kg/m²     General appearance: No apparent distress, appears stated age and cooperative. HEENT: Conjunctivae/corneas clear. Neck:  Trachea midline. Respiratory:  Normal respiratory effort.  Clear to auscultation  Cardiovascular: Regular rate and rhythm   Abdomen: Distended, tympanic, dullness in the flanks Musculoskeletal: No clubbing, cyanosis or edema bilaterally. Neuro: Non Focal  Capillary Refill: Brisk,< 3 seconds   Peripheral Pulses: +2 palpable, equal bilaterally     Labs:   Recent Labs     02/03/21  0611 02/04/21  0608 02/05/21  0605   WBC 11.2* 12.1* 10.3   HGB 14.2 14.5 14.1   HCT 42.5 44.2 41.8   * 448* 425*     Recent Labs     02/03/21  0611 02/04/21  0608 02/05/21  0605    135 137   K 3.2* 4.4 3.4    97 98   CO2 24 23 27   BUN 18 20 21   CREATININE 0.72 0.87 0.81   CALCIUM 8.5 8.5 8.4*     No results for input(s): AST, ALT, BILIDIR, BILITOT, ALKPHOS in the last 72 hours. No results for input(s): INR in the last 72 hours. No results for input(s): Ananya Older in the last 72 hours. Urinalysis:      Lab Results   Component Value Date    NITRU Negative 02/01/2021    BLOODU Negative 02/01/2021    SPECGRAV 1.065 02/01/2021    GLUCOSEU Negative 02/01/2021       Radiology:  XR CHEST ABDOMEN NG PLACEMENT   Final Result   INTERVAL PLACEMENT OF NASOGASTRIC TUBE AS DESCRIBED ABOVE      XR ABDOMEN (KUB) (SINGLE AP VIEW)   Final Result   DISTENDED DILATED LOOPS OF LARGE BOWEL WITH A TRANSITION POINT IN THE REGION OF THE DESCENDING COLON, SIGMOID. FINDINGS COULD SUGGEST THAT OF POSSIBLE HIGH-GRADE OBSTRUCTION. FURTHER EVALUATION IS WARRANTED. US PELVIS COMPLETE   Preliminary Result   FREE FLUID WITHIN THE PELVIS. EXAMINATION:  US DUP ABD PEL RETRO SCROT COMPLETE      CLINICAL HISTORY: Abnormal CT scan. COMPARISONS:  NONE AVAILABLE      TECHNIQUE:  Transabdominal ultrasound of the right upper quadrant with both duplex color ultrasound and spectral Doppler ultrasound with interrogation of the hepatic and portal venous system was performed. FINDINGS:     The visualized portion of the right lobe of the liver shows no focal parenchymal abnormalities. The hypoechoic area seen at the inferior medial aspect of the right lobe of liver on CT is not appreciated. Within the field-of-view there are findings of cholelithiasis. There is ascites present. The hepatic veins and portal veins are patent. Normal direction seen within the portal veins and hepatic vein. IMPRESSION:        1. ASCITES IS PRESENT WITHIN THE FIELD-OF-VIEW. 2. CHOLELITHIASIS. 3. UNREMARKABLE SONOGRAPHIC EXAMINATION OF THE HEPATIC AND PORTAL VEIN. US DUP ABD PEL RETRO SCROT COMPLETE   Preliminary Result   FREE FLUID WITHIN THE PELVIS. EXAMINATION:  US DUP ABD PEL RETRO SCROT COMPLETE      CLINICAL HISTORY: Abnormal CT scan. COMPARISONS:  NONE AVAILABLE      TECHNIQUE:  Transabdominal ultrasound of the right upper quadrant with both duplex color ultrasound and spectral Doppler ultrasound with interrogation of the hepatic and portal venous system was performed. FINDINGS:     The visualized portion of the right lobe of the liver shows no focal parenchymal abnormalities. The hypoechoic area seen at the inferior medial aspect of the right lobe of liver on CT is not appreciated. Within the field-of-view there are findings of cholelithiasis. There is ascites present. The hepatic veins and portal veins are patent. Normal direction seen within the portal veins and hepatic vein. IMPRESSION:        1. ASCITES IS PRESENT WITHIN THE FIELD-OF-VIEW. 2. CHOLELITHIASIS. 3. UNREMARKABLE SONOGRAPHIC EXAMINATION OF THE HEPATIC AND PORTAL VEIN. US GUIDED PARACENTESIS   Final Result   1. Status post technically successful ultrasound-guided paracentesis. Christiano Ozuna is a Female of 69 years age, referred for Ultrasound Guided Paracentesis. PROCEDURE: Survey of the abdomen showed large amount of ascites fluid. After obtaining informed consent, the patient was positioned supine on the sonography table. Using ultrasound, the skin over the left hemiabdomen was locally anesthetized with 1% lidocaine. Following that, a Yueh needle was advanced into the fluid    pocket using ultrasound visualization. 4440cc, of clear yellow fluid were aspirated and sent for cytology, and pathology. The needle was removed, and hemostasis was obtained with pressure. A Band-Aid was placed. Post procedure images did not demonstrate hemorrhage at the target site. The patient tolerated the procedure well. The patient left the department in good condition. A radiology nurse was in presence monitoring vital signs, assisting throughout the procedure. US ABDOMEN LIMITED   Final Result   The portal veins, hepatic vein, and common hepatic artery are patent with normal direction of blood flow. No lesions are seen in the right liver lobe, though ultrasound is limited, and if there is clinical suspicion, MRI of the liver can be    obtained. CT ABDOMEN PELVIS W IV CONTRAST Additional Contrast? None   Final Result      Thickening and nodularity of the omentum concerning for peritoneal metastasis or peritonitis. A large amount of ascites is present. Two ill-defined lesions within the right lobe of the liver measuring 1 cm and 2 cm respectively are nonspecific but most concerning for metastatic lesions. Mild perivesicular inflammation. Correlation with urinalysis recommended to exclude cystitis. Large amount stool within the rectosigmoid colon may represent constipation and/or fecal impaction. Subtle nodularity of the liver suggests cirrhosis. Mild right-sided hydronephrosis without radiopaque calculus. Debris within the gallbladder likely represents gallstones and/or gallbladder sludge. Small right pleural effusion. All CT scans at this facility use dose modulation, iterative reconstruction, and/or weight based dosing when appropriate to reduce radiation dose to as low as reasonably achievable. MRI ABDOMEN W WO CONTRAST MRCP    (Results Pending)     Assessment/Plan:    1. Suspected metastatic cancer with malignant ascites; possible abdominal carcinomatosis:  s/p paracentesis with 4L removed. Possibly pancreatic vs cholangiocarcinoma based on CA 19-9; plan for MRCP  2. Small bowel obstruction:  Gen surg consult; NPO; NG to LIS; suppository; plan for OR today for sigmoidoscopy  3. Right sided pleural effusion:  Likely from the ascites; ascites drained  4. Functional Status: Fall precautions. Up with assistance. PT OT  5. Diet: NPO  6. DVT ppx: Lovenox SCDs  7. Disposition: Dependent on hospital course. Will discharge once medically stable. SW on board for discharge planning. Active Hospital Problems    Diagnosis Date Noted    Fatigue [R53.83]     Malignant ascites [R18.0] 02/01/2021     Additional work up or/and treatment plan may be added today or then after based on clinical progression. I am managing a portion of pt care. Some medical issues are handled by other specialists. Additional work up and treatment should be done in out pt setting by pt PCP and other out pt providers. In addition to examining and evaluating pt, I spent additional time explaining care, normal and abnormal findings, and treatment plan. All of pt questions were answered. Counseling, diet and education were  provided. Case will be discussed with nursing staff when appropriate. Family will be updated if and when appropriate.       Diet: Diet NPO Time Specified    Code Status: Full Code    PT/OT Eval     Electronically signed by Kristen Fu MD on 2/5/2021 at 3:07 PM

## 2021-02-05 NOTE — ANESTHESIA PRE PROCEDURE
Department of Anesthesiology  Preprocedure Note       Name:  Alli Boyle   Age:  69 y.o.  :  1950                                          MRN:  [id number]         Date:  2021      Surgeon: Malika Abdalla):  Ester Lucas MD    Procedure: Procedure(s):  SIGMOIDOSCOPY BIOPSY FLEXIBLE    Medications prior to admission:   Prior to Admission medications    Medication Sig Start Date End Date Taking?  Authorizing Provider   ondansetron (ZOFRAN) 4 MG tablet Take 1 tablet by mouth every 12 hours as needed for Nausea or Vomiting 21  Yes Cassius Murdock MD   calcium citrate-vitamin D (CITRICAL + D) 315-250 MG-UNIT TABS per tablet Take 2 tablets by mouth daily   Yes Historical Provider, MD   Ascorbic Acid (VITAMIN C) 250 MG tablet Take 500 mg by mouth daily   Yes Historical Provider, MD       Current medications:    Current Facility-Administered Medications   Medication Dose Route Frequency Provider Last Rate Last Admin    sodium chloride flush 0.9 % injection 10 mL  10 mL Intravenous 2 times per day Ester Lucas MD        sodium chloride flush 0.9 % injection 10 mL  10 mL Intravenous PRN Ester Lucas MD        sterile water for irrigation    PRN Ester Lucas MD   1,000 mL at 21 1311    spironolactone (ALDACTONE) tablet 50 mg  50 mg Oral Daily Efra Tellez MD        furosemide (LASIX) tablet 20 mg  20 mg Oral Daily Efra Tellez MD        dextrose 5 % in lactated ringers infusion   Intravenous Continuous Efra Tellez  mL/hr at 21 0036 New Bag at 21 0036    sodium chloride flush 0.9 % injection 10 mL  10 mL Intravenous Once Angela Garcia DO        sodium chloride flush 0.9 % injection 10 mL  10 mL Intravenous 2 times per day Efra Tellez MD   10 mL at 21 1947    sodium chloride flush 0.9 % injection 10 mL  10 mL Intravenous PRN Efra Tellez MD  enoxaparin (LOVENOX) injection 40 mg  40 mg Subcutaneous Daily Yumiko Duke MD   40 mg at 02/04/21 0903    promethazine (PHENERGAN) tablet 12.5 mg  12.5 mg Oral Q6H PRN Yumiko Duke MD        Or    ondansetron (ZOFRAN) injection 4 mg  4 mg Intravenous Q6H PRN Yumiko Duke MD   4 mg at 02/04/21 0514    acetaminophen (TYLENOL) tablet 650 mg  650 mg Oral Q6H PRN Yumiko Duke MD        Or    acetaminophen (TYLENOL) suppository 650 mg  650 mg Rectal Q6H PRN Yumiko Duke MD        senna (SENOKOT) tablet 17.2 mg  2 tablet Oral Nightly Yumiko Duke MD   17.2 mg at 02/02/21 2138    docusate sodium (COLACE) capsule 100 mg  100 mg Oral BID Yumiko Duke MD   100 mg at 02/04/21 0904    bisacodyl (DULCOLAX) suppository 10 mg  10 mg Rectal Daily Yumiko Duke MD   10 mg at 02/04/21 2488       Allergies:     Allergies   Allergen Reactions    Aspirin Other (See Comments)     GI issue       Problem List:    Patient Active Problem List   Diagnosis Code    Malignant ascites R18.0    Fatigue R53.83       Past Medical History:        Diagnosis Date    Anxiety     panic attacks    Fibroids 1998    has complete hysterctomy    Kidney stone     14 years ago       Past Surgical History:        Procedure Laterality Date    HYSTERECTOMY, TOTAL ABDOMINAL      PARACENTESIS Left 02/02/2021    4,440 ml removed by Dr. Martinez Patient History:    Social History     Tobacco Use    Smoking status: Never Smoker    Smokeless tobacco: Never Used   Substance Use Topics    Alcohol use: Yes     Comment: occasional- socially                                Counseling given: Not Answered      Vital Signs (Current):   Vitals:    02/04/21 2011 02/05/21 0730 02/05/21 0739 02/05/21 1348   BP: (!) 153/82 (!) 147/83 (!) 147/83    Pulse: 91 82 91 94   Resp:  16 16 16   Temp: 36.7 °C (98.1 °F) 36.3 °C (97.4 °F) 36.3 °C (97.3 °F) 36.2 °C (97.1 °F)   TempSrc:  Oral Oral    SpO2: 99% 99%  95%   Weight:    150 lb (68 kg) Height:    5' 5\" (1.651 m)                                              BP Readings from Last 3 Encounters:   02/05/21 (!) 147/83   01/19/21 (!) 138/90   01/14/21 130/78       NPO Status:                                                                                 BMI:   Wt Readings from Last 3 Encounters:   02/05/21 150 lb (68 kg)   01/19/21 160 lb 9.6 oz (72.8 kg)   01/14/21 160 lb (72.6 kg)     Body mass index is 24.96 kg/m². CBC:   Lab Results   Component Value Date    WBC 10.3 02/05/2021    RBC 4.81 02/05/2021    HGB 14.1 02/05/2021    HCT 41.8 02/05/2021    MCV 87.0 02/05/2021    RDW 12.8 02/05/2021     02/05/2021       CMP:   Lab Results   Component Value Date     02/05/2021    K 3.4 02/05/2021    CL 98 02/05/2021    CO2 27 02/05/2021    BUN 21 02/05/2021    CREATININE 0.81 02/05/2021    GFRAA >60.0 02/05/2021    LABGLOM >60.0 02/05/2021    GLUCOSE 196 02/05/2021    PROT 5.9 02/02/2021    CALCIUM 8.4 02/05/2021    BILITOT 0.6 02/02/2021    ALKPHOS 139 02/02/2021    AST 32 02/02/2021    ALT 38 02/02/2021       POC Tests: No results for input(s): POCGLU, POCNA, POCK, POCCL, POCBUN, POCHEMO, POCHCT in the last 72 hours.     Coags:   Lab Results   Component Value Date    PROTIME 13.3 02/01/2021    INR 1.0 02/01/2021       HCG (If Applicable): No results found for: PREGTESTUR, PREGSERUM, HCG, HCGQUANT     ABGs: No results found for: PHART, PO2ART, IQD7QTN, OKM5RVY, BEART, P1VNGUDJ     Type & Screen (If Applicable):  No results found for: LABABO, LABRH    Drug/Infectious Status (If Applicable):  No results found for: HIV, HEPCAB    COVID-19 Screening (If Applicable):   Lab Results   Component Value Date    COVID19 Not Detected 02/01/2021         Anesthesia Evaluation  Patient summary reviewed and Nursing notes reviewed no history of anesthetic complications:   Airway: Mallampati: II  TM distance: >3 FB   Neck ROM: full  Mouth opening: > = 3 FB Dental: normal exam         Pulmonary:normal exam Cardiovascular:          ECG reviewed      Echocardiogram reviewed                  Neuro/Psych:               GI/Hepatic/Renal:   (+) liver disease:, bowel prep,           Endo/Other:    (+) malignancy/cancer. Abdominal:           Vascular:                                      Anesthesia Plan      MAC     ASA 3       Induction: intravenous. Anesthetic plan and risks discussed with patient. Plan discussed with attending.                   ALBERTINA Berrios - JOAO   2/5/2021

## 2021-02-05 NOTE — PROGRESS NOTES
Shift assessment performed. Vitals stable. Patient is A&O x4, patient states that she feels very weak today- PT/Ot order placed to evaluate patient. Patient is able to get MRI of abdomen tomorrow with NG tube in PER radiology due to NG not having any metal in it. NG had 200 out with 60cc of free water flush. Denies any needs at this time.      Electronically signed by Aquilino Dumont RN on 2/5/2021 at 6:17 PM

## 2021-02-05 NOTE — PROGRESS NOTES
PCA to room to offer to help clean patient up for procedure today. Pt states she doesn't want to get cleaned up until after enemas.   Electronically signed by Shreyas Price RN on 2/5/2021 at 6:27 AM

## 2021-02-05 NOTE — PROGRESS NOTES
Comprehensive Nutrition Assessment    Type and Reason for Visit:  Initial, Positive Nutrition Screen(poor po/wt loss)    Nutrition Recommendations/Plan:   Recommend change IVF to PPN and consider obtaining central access for TPN if unable to tolerate diet advancement over the weekend    Nutrition Assessment:  Pt remains at high nutrition risk, no po intake noted with clear liquids/NPO x 4 days. Noted possible bowel obstruction, s/p sigmoidoscopy today. Suspected metastatic cancer with malignant ascites noted, awaiting cytology, NGT placed with no output noted per I/O's. Recommend change IVF to PPN and consider central access for TPN if unable to tolerate diet advancement. Malnutrition Assessment:  Malnutrition Status: At risk for malnutrition (Comment)    Context:  Acute Illness     Findings of the 6 clinical characteristics of malnutrition:  Energy Intake:  Mild decrease in energy intake (Comment)  Weight Loss:  7 - Greater than 5% over 1 month     Body Fat Loss:  Unable to assess     Muscle Mass Loss:  Unable to assess    Fluid Accumulation:  Unable to assess     Strength:  Not Performed    Estimated Daily Nutrient Needs:  Energy (kcal):  1957-0716 (kg x 22-25); Weight Used for Energy Requirements:  Current(69.8 kg)     Protein (g):  73-79 gm (kg IBW x 1.3-1.4); Weight Used for Protein Requirements:  Ideal(56.8 kg)        Fluid (ml/day):  ~1700 ml; Method Used for Fluid Requirements:  1 ml/kcal      Nutrition Related Findings:  Noted per hospitalist: 'Suspected metastatic cancer with malignant ascites; possible abdominal carcinomatosis:  s/p paracentesis with 4L removed. Possibly pancreatic vs cholangiocarcinoma based on CA 19-9; plan for MRCP. Small bowel obstruction:  Gen surg consult; NPO; NG to LIS; suppository; plan for OR today for sigmoidoscopy'. Multiple soap suds enemas past several days, IVF: D5 in LR @ 100 ml/hr via peripheral line. No po intake noted. labs/meds reviewed (Gluc 196)

## 2021-02-06 ENCOUNTER — APPOINTMENT (OUTPATIENT)
Dept: GENERAL RADIOLOGY | Age: 71
DRG: 981 | End: 2021-02-06
Payer: MEDICARE

## 2021-02-06 LAB
ANION GAP SERPL CALCULATED.3IONS-SCNC: 14 MEQ/L (ref 9–15)
BASOPHILS ABSOLUTE: 0 K/UL (ref 0–0.2)
BASOPHILS RELATIVE PERCENT: 0.4 %
BUN BLDV-MCNC: 19 MG/DL (ref 8–23)
CALCIUM SERPL-MCNC: 8.3 MG/DL (ref 8.5–9.9)
CHLORIDE BLD-SCNC: 101 MEQ/L (ref 95–107)
CO2: 25 MEQ/L (ref 20–31)
CREAT SERPL-MCNC: 0.83 MG/DL (ref 0.5–0.9)
EOSINOPHILS ABSOLUTE: 0 K/UL (ref 0–0.7)
EOSINOPHILS RELATIVE PERCENT: 0.4 %
GFR AFRICAN AMERICAN: >60
GFR NON-AFRICAN AMERICAN: >60
GLUCOSE BLD-MCNC: 160 MG/DL (ref 70–99)
HCT VFR BLD CALC: 42.1 % (ref 37–47)
HEMOGLOBIN: 13.9 G/DL (ref 12–16)
LYMPHOCYTES ABSOLUTE: 1.2 K/UL (ref 1–4.8)
LYMPHOCYTES RELATIVE PERCENT: 10.7 %
MAGNESIUM: 2.1 MG/DL (ref 1.7–2.4)
MCH RBC QN AUTO: 29 PG (ref 27–31.3)
MCHC RBC AUTO-ENTMCNC: 33.1 % (ref 33–37)
MCV RBC AUTO: 87.8 FL (ref 82–100)
MONOCYTES ABSOLUTE: 1 K/UL (ref 0.2–0.8)
MONOCYTES RELATIVE PERCENT: 8.9 %
NEUTROPHILS ABSOLUTE: 8.8 K/UL (ref 1.4–6.5)
NEUTROPHILS RELATIVE PERCENT: 79.6 %
PDW BLD-RTO: 13.3 % (ref 11.5–14.5)
PLATELET # BLD: 382 K/UL (ref 130–400)
POTASSIUM REFLEX MAGNESIUM: 3.4 MEQ/L (ref 3.4–4.9)
RBC # BLD: 4.79 M/UL (ref 4.2–5.4)
SODIUM BLD-SCNC: 140 MEQ/L (ref 135–144)
WBC # BLD: 11.1 K/UL (ref 4.8–10.8)

## 2021-02-06 PROCEDURE — 80048 BASIC METABOLIC PNL TOTAL CA: CPT

## 2021-02-06 PROCEDURE — 83735 ASSAY OF MAGNESIUM: CPT

## 2021-02-06 PROCEDURE — 74250 X-RAY XM SM INT 1CNTRST STD: CPT

## 2021-02-06 PROCEDURE — 74270 X-RAY XM COLON 1CNTRST STD: CPT

## 2021-02-06 PROCEDURE — 36415 COLL VENOUS BLD VENIPUNCTURE: CPT

## 2021-02-06 PROCEDURE — 2500000003 HC RX 250 WO HCPCS: Performed by: INTERNAL MEDICINE

## 2021-02-06 PROCEDURE — A4641 RADIOPHARM DX AGENT NOC: HCPCS | Performed by: SPECIALIST

## 2021-02-06 PROCEDURE — 6360000002 HC RX W HCPCS: Performed by: INTERNAL MEDICINE

## 2021-02-06 PROCEDURE — 2700000000 HC OXYGEN THERAPY PER DAY

## 2021-02-06 PROCEDURE — 99231 SBSQ HOSP IP/OBS SF/LOW 25: CPT | Performed by: SPECIALIST

## 2021-02-06 PROCEDURE — 2580000003 HC RX 258: Performed by: INTERNAL MEDICINE

## 2021-02-06 PROCEDURE — 2500000003 HC RX 250 WO HCPCS: Performed by: COLON & RECTAL SURGERY

## 2021-02-06 PROCEDURE — 1210000000 HC MED SURG R&B

## 2021-02-06 PROCEDURE — 6360000004 HC RX CONTRAST MEDICATION: Performed by: SPECIALIST

## 2021-02-06 PROCEDURE — 2580000003 HC RX 258: Performed by: SPECIALIST

## 2021-02-06 PROCEDURE — 85025 COMPLETE CBC W/AUTO DIFF WBC: CPT

## 2021-02-06 PROCEDURE — 6370000000 HC RX 637 (ALT 250 FOR IP): Performed by: INTERNAL MEDICINE

## 2021-02-06 RX ORDER — KETOROLAC TROMETHAMINE 15 MG/ML
15 INJECTION, SOLUTION INTRAMUSCULAR; INTRAVENOUS EVERY 6 HOURS PRN
Status: DISPENSED | OUTPATIENT
Start: 2021-02-06 | End: 2021-02-11

## 2021-02-06 RX ORDER — PANTOPRAZOLE SODIUM 40 MG/1
40 TABLET, DELAYED RELEASE ORAL
Status: DISCONTINUED | OUTPATIENT
Start: 2021-02-07 | End: 2021-02-18 | Stop reason: HOSPADM

## 2021-02-06 RX ADMIN — ENOXAPARIN SODIUM 40 MG: 40 INJECTION SUBCUTANEOUS at 18:27

## 2021-02-06 RX ADMIN — ACETAMINOPHEN 650 MG: 325 TABLET ORAL at 09:40

## 2021-02-06 RX ADMIN — ONDANSETRON 4 MG: 2 INJECTION INTRAMUSCULAR; INTRAVENOUS at 15:27

## 2021-02-06 RX ADMIN — BARIUM SULFATE 1900 ML: 1.05 SUSPENSION ORAL; RECTAL at 11:07

## 2021-02-06 RX ADMIN — SODIUM CHLORIDE: 9 INJECTION, SOLUTION INTRAVENOUS at 01:49

## 2021-02-06 RX ADMIN — SODIUM CHLORIDE: 9 INJECTION, SOLUTION INTRAVENOUS at 12:17

## 2021-02-06 RX ADMIN — FAMOTIDINE 20 MG: 10 INJECTION INTRAVENOUS at 20:17

## 2021-02-06 RX ADMIN — BARIUM SULFATE 352 ML: 960 POWDER, FOR SUSPENSION ORAL at 11:28

## 2021-02-06 RX ADMIN — ASCORBIC ACID, VITAMIN A PALMITATE, CHOLECALCIFEROL, THIAMINE HYDROCHLORIDE, RIBOFLAVIN-5 PHOSPHATE SODIUM, PYRIDOXINE HYDROCHLORIDE, NIACINAMIDE, DEXPANTHENOL, ALPHA-TOCOPHEROL ACETATE, VITAMIN K1, FOLIC ACID, BIOTIN, CYANOCOBALAMIN: 200; 3300; 200; 6; 3.6; 6; 40; 15; 10; 150; 600; 60; 5 INJECTION, SOLUTION INTRAVENOUS at 18:27

## 2021-02-06 RX ADMIN — KETOROLAC TROMETHAMINE 15 MG: 15 INJECTION, SOLUTION INTRAMUSCULAR; INTRAVENOUS at 21:38

## 2021-02-06 RX ADMIN — FAMOTIDINE 20 MG: 10 INJECTION INTRAVENOUS at 15:34

## 2021-02-06 RX ADMIN — SODIUM CHLORIDE, PRESERVATIVE FREE 10 ML: 5 INJECTION INTRAVENOUS at 09:29

## 2021-02-06 RX ADMIN — SODIUM CHLORIDE, PRESERVATIVE FREE 10 ML: 5 INJECTION INTRAVENOUS at 20:17

## 2021-02-06 RX ADMIN — Medication 10 ML: at 18:28

## 2021-02-06 RX ADMIN — KETOROLAC TROMETHAMINE 15 MG: 15 INJECTION, SOLUTION INTRAMUSCULAR; INTRAVENOUS at 15:27

## 2021-02-06 RX ADMIN — Medication 10 ML: at 09:30

## 2021-02-06 ASSESSMENT — PAIN DESCRIPTION - PAIN TYPE
TYPE: ACUTE PAIN
TYPE: ACUTE PAIN

## 2021-02-06 ASSESSMENT — PAIN SCALES - GENERAL: PAINLEVEL_OUTOF10: 5

## 2021-02-06 ASSESSMENT — PAIN DESCRIPTION - LOCATION
LOCATION: ABDOMEN
LOCATION: ABDOMEN

## 2021-02-06 NOTE — PROGRESS NOTES
Brisa Mojica is a 69 y.o. female patient.     Current Facility-Administered Medications   Medication Dose Route Frequency Provider Last Rate Last Admin    ketorolac (TORADOL) injection 30 mg  30 mg Intravenous Q6H PRN Jefferson Roth MD        famotidine (PEPCID) injection 20 mg  20 mg Intravenous BID Jefferson Roth MD        sodium chloride flush 0.9 % injection 10 mL  10 mL Intravenous 2 times per day John Botello MD        sodium chloride flush 0.9 % injection 10 mL  10 mL Intravenous PRN John Botello MD        0.9 % sodium chloride infusion   Intravenous Continuous John Botello  mL/hr at 02/06/21 1217 New Bag at 02/06/21 1217    sodium chloride flush 0.9 % injection 10 mL  10 mL Intravenous 2 times per day John Botello MD   10 mL at 02/05/21 2216    sodium chloride flush 0.9 % injection 10 mL  10 mL Intravenous PRN John Botello MD        spironolactone (ALDACTONE) tablet 50 mg  50 mg Oral Daily Jefferson Roth MD        furosemide (LASIX) tablet 20 mg  20 mg Oral Daily Jefferson Roth MD        dextrose 5 % in lactated ringers infusion   Intravenous Continuous Jefferson Roth  mL/hr at 02/05/21 0036 New Bag at 02/05/21 0036    sodium chloride flush 0.9 % injection 10 mL  10 mL Intravenous Once Angela Garcia DO        sodium chloride flush 0.9 % injection 10 mL  10 mL Intravenous 2 times per day Jefferson Roth MD   10 mL at 02/03/21 1947    sodium chloride flush 0.9 % injection 10 mL  10 mL Intravenous PRN Jefferson Roth MD        enoxaparin (LOVENOX) injection 40 mg  40 mg Subcutaneous Daily Jefferson Roth MD   40 mg at 02/04/21 0903    promethazine (PHENERGAN) tablet 12.5 mg  12.5 mg Oral Q6H PRN Jefferson Roth MD        Or    ondansetron (ZOFRAN) injection 4 mg  4 mg Intravenous Q6H PRN Jefferson Roth MD   4 mg at 02/05/21 2341    acetaminophen (TYLENOL) tablet 650 mg  650 mg Oral Q6H PRN Jefferson Roth MD   650 mg at 02/06/21 0957    Or  acetaminophen (TYLENOL) suppository 650 mg  650 mg Rectal Q6H PRN Esequiel Castaneda MD        senna (SENOKOT) tablet 17.2 mg  2 tablet Oral Nightly Esequiel Castaneda MD   17.2 mg at 02/02/21 2138    docusate sodium (COLACE) capsule 100 mg  100 mg Oral BID Esequiel Castaneda MD   100 mg at 02/04/21 3963    bisacodyl (DULCOLAX) suppository 10 mg  10 mg Rectal Daily Esequiel Castaneda MD   10 mg at 02/04/21 9298     Allergies   Allergen Reactions    Aspirin Other (See Comments)     GI issue     Active Problems:    Malignant ascites    Fatigue  Resolved Problems:    * No resolved hospital problems. *    Blood pressure (!) 143/96, pulse 100, temperature 97.7 °F (36.5 °C), temperature source Oral, resp. rate 16, height 5' 5\" (1.651 m), weight 150 lb (68 kg), SpO2 98 %, not currently breastfeeding. Subjective has abdominal discomfort, on NG suction  Objective distended abdomen, had barium enema and SBFT, results are pending, ascitic fluid cytology still pending. Assessment & Plan, Peritoneal carcinomatosis with possible bowel obstruction, continue current management, will start IV protonix since pt is on continious NG suction.     Rich Resendiz MD  2/6/2021

## 2021-02-06 NOTE — PROGRESS NOTES
Gave soap suds enema times one. Patient did not tolerate that well. Had to do it in 2  consecutive times. Patient was unable to hold for very long. Clear liquid present after enema.

## 2021-02-06 NOTE — PROGRESS NOTES
PHARMACY NOTE:   Renal Adjustment Per Protocol: ketorolac 30mg IV Q6 hours PRN changed to ketorolac 15mg IV Q6 hours PRN based on hospital renal dosing protocol for patients age > 61years old     Recent Labs     02/06/21  0619   CREATININE 0.83    Estimated Creatinine Clearance: 56 mL/min (based on SCr of 0.83 mg/dL).     Ling De Anda, PharmD   2/6/2021 3:19 PM

## 2021-02-06 NOTE — PROGRESS NOTES
Hospitalist Progress Note      PCP: Kaylie Santana MD    Date of Admission: 2/1/2021    Chief Complaint:    Chief Complaint   Patient presents with    Fatigue     sent by dr Pedro Elaine for dehydration work up , patient has been vomiting and not keeping anything down and is very fatigued     Subjective: The patient has her NG in place; is in distress. 12 point ROS negative other than mentioned above     Medications:  Reviewed    Infusion Medications    PN-Adult Premix 4.25/10 - Peripheral Line      sodium chloride 125 mL/hr at 02/06/21 1217    dextrose 5% in lactated ringers 100 mL/hr at 02/05/21 0036     Scheduled Medications    famotidine (PEPCID) injection  20 mg Intravenous BID    [START ON 2/7/2021] pantoprazole  40 mg Oral QAM AC    sodium chloride flush  10 mL Intravenous 2 times per day    sodium chloride flush  10 mL Intravenous 2 times per day    spironolactone  50 mg Oral Daily    furosemide  20 mg Oral Daily    sodium chloride flush  10 mL Intravenous Once    sodium chloride flush  10 mL Intravenous 2 times per day    enoxaparin  40 mg Subcutaneous Daily    senna  2 tablet Oral Nightly    docusate sodium  100 mg Oral BID    bisacodyl  10 mg Rectal Daily     PRN Meds: ketorolac, sodium chloride flush, sodium chloride flush, sodium chloride flush, promethazine **OR** ondansetron, acetaminophen **OR** acetaminophen      Intake/Output Summary (Last 24 hours) at 2/6/2021 1529  Last data filed at 2/6/2021 0516  Gross per 24 hour   Intake 1875 ml   Output 800 ml   Net 1075 ml     Exam:    BP (!) 143/96   Pulse 100   Temp 97.7 °F (36.5 °C) (Oral)   Resp 16   Ht 5' 5\" (1.651 m)   Wt 150 lb (68 kg)   SpO2 98%   BMI 24.96 kg/m²     General appearance: Slightly distressed  HEENT: Conjunctivae/corneas clear. Neck:  Trachea midline. Respiratory:  Normal respiratory effort.  Clear to auscultation  Cardiovascular: Regular rate and rhythm   Abdomen: Distended, tympanic Within the field-of-view there are findings of cholelithiasis. There is ascites present. The hepatic veins and portal veins are patent. Normal direction seen within the portal veins and hepatic vein. IMPRESSION:        1. ASCITES IS PRESENT WITHIN THE FIELD-OF-VIEW. 2. CHOLELITHIASIS. 3. UNREMARKABLE SONOGRAPHIC EXAMINATION OF THE HEPATIC AND PORTAL VEIN. US DUP ABD PEL RETRO SCROT COMPLETE   Preliminary Result   FREE FLUID WITHIN THE PELVIS. EXAMINATION:  US DUP ABD PEL RETRO SCROT COMPLETE      CLINICAL HISTORY: Abnormal CT scan. COMPARISONS:  NONE AVAILABLE      TECHNIQUE:  Transabdominal ultrasound of the right upper quadrant with both duplex color ultrasound and spectral Doppler ultrasound with interrogation of the hepatic and portal venous system was performed. FINDINGS:     The visualized portion of the right lobe of the liver shows no focal parenchymal abnormalities. The hypoechoic area seen at the inferior medial aspect of the right lobe of liver on CT is not appreciated. Within the field-of-view there are findings of cholelithiasis. There is ascites present. The hepatic veins and portal veins are patent. Normal direction seen within the portal veins and hepatic vein. IMPRESSION:        1. ASCITES IS PRESENT WITHIN THE FIELD-OF-VIEW. 2. CHOLELITHIASIS. 3. UNREMARKABLE SONOGRAPHIC EXAMINATION OF THE HEPATIC AND PORTAL VEIN. US GUIDED PARACENTESIS   Final Result   1. Status post technically successful ultrasound-guided paracentesis. Facundo Kasper is a Female of 69 years age, referred for Ultrasound Guided Paracentesis. PROCEDURE: Survey of the abdomen showed large amount of ascites fluid. After obtaining informed consent, the patient was positioned supine on the sonography table. Using ultrasound, the skin over the left hemiabdomen was locally anesthetized with 1% lidocaine. Following that, a Yueh needle was advanced into the fluid    pocket using ultrasound visualization. 4440cc, of clear yellow fluid were aspirated and sent for cytology, and pathology. The needle was removed, and hemostasis was obtained with pressure. A Band-Aid was placed. Post procedure images did not demonstrate hemorrhage at the target site. The patient tolerated the procedure well. The patient left the department in good condition. A radiology nurse was in presence monitoring vital signs, assisting throughout the procedure. US ABDOMEN LIMITED   Final Result   The portal veins, hepatic vein, and common hepatic artery are patent with normal direction of blood flow. No lesions are seen in the right liver lobe, though ultrasound is limited, and if there is clinical suspicion, MRI of the liver can be    obtained. CT ABDOMEN PELVIS W IV CONTRAST Additional Contrast? None   Final Result      Thickening and nodularity of the omentum concerning for peritoneal metastasis or peritonitis. A large amount of ascites is present. Two ill-defined lesions within the right lobe of the liver measuring 1 cm and 2 cm respectively are nonspecific but most concerning for metastatic lesions. Mild perivesicular inflammation. Correlation with urinalysis recommended to exclude cystitis. Large amount stool within the rectosigmoid colon may represent constipation and/or fecal impaction. Subtle nodularity of the liver suggests cirrhosis. Mild right-sided hydronephrosis without radiopaque calculus. Debris within the gallbladder likely represents gallstones and/or gallbladder sludge. Small right pleural effusion. All CT scans at this facility use dose modulation, iterative reconstruction, and/or weight based dosing when appropriate to reduce radiation dose to as low as reasonably achievable. MRI ABDOMEN W WO CONTRAST MRCP    (Results Pending)   FL BARIUM ENEMA    (Results Pending)   FL SMALL BOWEL FOLLOW THROUGH ONLY    (Results Pending)     Assessment/Plan:    1. 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 Possibly pancreatic vs cholangiocarcinoma based on CA 19-9; plan for MRCP when more stable; other sources being worked up by oncology  2. Small bowel obstruction:  S/p small bowel follow through; now back to LIS. Start PPN  3. Right sided pleural effusion:  Likely from the ascites; ascites drained  4. Functional Status: Fall precautions. Up with assistance. PT OT  5. Diet: NPO  6. DVT ppx: Lovenox SCDs  7. Disposition: Dependent on hospital course. Will discharge once medically stable. SW on board for discharge planning. Active Hospital Problems    Diagnosis Date Noted    Fatigue [R53.83]     Malignant ascites [R18.0] 02/01/2021     Additional work up or/and treatment plan may be added today or then after based on clinical progression. I am managing a portion of pt care. Some medical issues are handled by other specialists. Additional work up and treatment should be done in out pt setting by pt PCP and other out pt providers. In addition to examining and evaluating pt, I spent additional time explaining care, normal and abnormal findings, and treatment plan. All of pt questions were answered. Counseling, diet and education were  provided. Case will be discussed with nursing staff when appropriate. Family will be updated if and when appropriate.       Diet: Diet NPO Time Specified Exceptions are: Ice Chips  PN-Adult Premix 4.25/10 - Peripheral Line    Code Status: Full Code    PT/OT Laura Electronically signed by Esequiel Castaneda MD on 2/6/2021 at 3:29 PM

## 2021-02-07 LAB
ANION GAP SERPL CALCULATED.3IONS-SCNC: 14 MEQ/L (ref 9–15)
BASOPHILS ABSOLUTE: 0 K/UL (ref 0–0.2)
BASOPHILS RELATIVE PERCENT: 0.3 %
BUN BLDV-MCNC: 34 MG/DL (ref 8–23)
CALCIUM SERPL-MCNC: 8.3 MG/DL (ref 8.5–9.9)
CHLORIDE BLD-SCNC: 99 MEQ/L (ref 95–107)
CO2: 21 MEQ/L (ref 20–31)
CREAT SERPL-MCNC: 1.27 MG/DL (ref 0.5–0.9)
EOSINOPHILS ABSOLUTE: 0 K/UL (ref 0–0.7)
EOSINOPHILS RELATIVE PERCENT: 0 %
GFR AFRICAN AMERICAN: 50.1
GFR NON-AFRICAN AMERICAN: 41.4
GLUCOSE BLD-MCNC: 125 MG/DL (ref 60–115)
GLUCOSE BLD-MCNC: 197 MG/DL (ref 60–115)
GLUCOSE BLD-MCNC: 215 MG/DL (ref 60–115)
GLUCOSE BLD-MCNC: 230 MG/DL (ref 70–99)
HCT VFR BLD CALC: 42.9 % (ref 37–47)
HEMOGLOBIN: 14.2 G/DL (ref 12–16)
LYMPHOCYTES ABSOLUTE: 1.1 K/UL (ref 1–4.8)
LYMPHOCYTES RELATIVE PERCENT: 8.4 %
MAGNESIUM: 1.9 MG/DL (ref 1.7–2.4)
MCH RBC QN AUTO: 29 PG (ref 27–31.3)
MCHC RBC AUTO-ENTMCNC: 33.1 % (ref 33–37)
MCV RBC AUTO: 87.6 FL (ref 82–100)
MONOCYTES ABSOLUTE: 1 K/UL (ref 0.2–0.8)
MONOCYTES RELATIVE PERCENT: 7.4 %
NEUTROPHILS ABSOLUTE: 11.4 K/UL (ref 1.4–6.5)
NEUTROPHILS RELATIVE PERCENT: 83.9 %
PDW BLD-RTO: 13.1 % (ref 11.5–14.5)
PERFORMED ON: ABNORMAL
PHOSPHORUS: 1.9 MG/DL (ref 2.3–4.8)
PLATELET # BLD: 345 K/UL (ref 130–400)
POTASSIUM REFLEX MAGNESIUM: 3.3 MEQ/L (ref 3.4–4.9)
RBC # BLD: 4.9 M/UL (ref 4.2–5.4)
SLIDE REVIEW: ABNORMAL
SODIUM BLD-SCNC: 134 MEQ/L (ref 135–144)
WBC # BLD: 13.5 K/UL (ref 4.8–10.8)

## 2021-02-07 PROCEDURE — 97162 PT EVAL MOD COMPLEX 30 MIN: CPT

## 2021-02-07 PROCEDURE — 84100 ASSAY OF PHOSPHORUS: CPT

## 2021-02-07 PROCEDURE — 6360000002 HC RX W HCPCS: Performed by: INTERNAL MEDICINE

## 2021-02-07 PROCEDURE — 6370000000 HC RX 637 (ALT 250 FOR IP): Performed by: INTERNAL MEDICINE

## 2021-02-07 PROCEDURE — 83735 ASSAY OF MAGNESIUM: CPT

## 2021-02-07 PROCEDURE — 36415 COLL VENOUS BLD VENIPUNCTURE: CPT

## 2021-02-07 PROCEDURE — 85025 COMPLETE CBC W/AUTO DIFF WBC: CPT

## 2021-02-07 PROCEDURE — 99232 SBSQ HOSP IP/OBS MODERATE 35: CPT | Performed by: COLON & RECTAL SURGERY

## 2021-02-07 PROCEDURE — 1210000000 HC MED SURG R&B

## 2021-02-07 PROCEDURE — 97166 OT EVAL MOD COMPLEX 45 MIN: CPT

## 2021-02-07 PROCEDURE — 2500000003 HC RX 250 WO HCPCS: Performed by: INTERNAL MEDICINE

## 2021-02-07 PROCEDURE — 2580000003 HC RX 258: Performed by: SPECIALIST

## 2021-02-07 PROCEDURE — 80048 BASIC METABOLIC PNL TOTAL CA: CPT

## 2021-02-07 RX ORDER — NICOTINE POLACRILEX 4 MG
15 LOZENGE BUCCAL PRN
Status: DISCONTINUED | OUTPATIENT
Start: 2021-02-07 | End: 2021-02-18 | Stop reason: HOSPADM

## 2021-02-07 RX ORDER — DEXTROSE MONOHYDRATE 25 G/50ML
12.5 INJECTION, SOLUTION INTRAVENOUS PRN
Status: DISCONTINUED | OUTPATIENT
Start: 2021-02-07 | End: 2021-02-18 | Stop reason: HOSPADM

## 2021-02-07 RX ORDER — POTASSIUM CHLORIDE 7.45 MG/ML
10 INJECTION INTRAVENOUS PRN
Status: DISCONTINUED | OUTPATIENT
Start: 2021-02-07 | End: 2021-02-18 | Stop reason: HOSPADM

## 2021-02-07 RX ORDER — INSULIN GLARGINE 100 [IU]/ML
10 INJECTION, SOLUTION SUBCUTANEOUS NIGHTLY
Status: DISCONTINUED | OUTPATIENT
Start: 2021-02-07 | End: 2021-02-18 | Stop reason: HOSPADM

## 2021-02-07 RX ORDER — DEXTROSE MONOHYDRATE 50 MG/ML
100 INJECTION, SOLUTION INTRAVENOUS PRN
Status: DISCONTINUED | OUTPATIENT
Start: 2021-02-07 | End: 2021-02-18 | Stop reason: HOSPADM

## 2021-02-07 RX ORDER — MAGNESIUM SULFATE 1 G/100ML
1000 INJECTION INTRAVENOUS PRN
Status: DISCONTINUED | OUTPATIENT
Start: 2021-02-07 | End: 2021-02-18 | Stop reason: HOSPADM

## 2021-02-07 RX ADMIN — ONDANSETRON 4 MG: 2 INJECTION INTRAMUSCULAR; INTRAVENOUS at 22:50

## 2021-02-07 RX ADMIN — FAMOTIDINE 20 MG: 10 INJECTION INTRAVENOUS at 20:11

## 2021-02-07 RX ADMIN — SODIUM CHLORIDE, PRESERVATIVE FREE 10 ML: 5 INJECTION INTRAVENOUS at 20:11

## 2021-02-07 RX ADMIN — ONDANSETRON 4 MG: 2 INJECTION INTRAMUSCULAR; INTRAVENOUS at 10:13

## 2021-02-07 RX ADMIN — INSULIN LISPRO 1 UNITS: 100 INJECTION, SOLUTION INTRAVENOUS; SUBCUTANEOUS at 18:34

## 2021-02-07 RX ADMIN — ONDANSETRON 4 MG: 2 INJECTION INTRAMUSCULAR; INTRAVENOUS at 00:03

## 2021-02-07 RX ADMIN — KETOROLAC TROMETHAMINE 15 MG: 15 INJECTION, SOLUTION INTRAMUSCULAR; INTRAVENOUS at 16:24

## 2021-02-07 RX ADMIN — ASCORBIC ACID, VITAMIN A PALMITATE, CHOLECALCIFEROL, THIAMINE HYDROCHLORIDE, RIBOFLAVIN-5 PHOSPHATE SODIUM, PYRIDOXINE HYDROCHLORIDE, NIACINAMIDE, DEXPANTHENOL, ALPHA-TOCOPHEROL ACETATE, VITAMIN K1, FOLIC ACID, BIOTIN, CYANOCOBALAMIN: 200; 3300; 200; 6; 3.6; 6; 40; 15; 10; 150; 600; 60; 5 INJECTION, SOLUTION INTRAVENOUS at 20:12

## 2021-02-07 RX ADMIN — ONDANSETRON 4 MG: 2 INJECTION INTRAMUSCULAR; INTRAVENOUS at 16:24

## 2021-02-07 RX ADMIN — INSULIN LISPRO 2 UNITS: 100 INJECTION, SOLUTION INTRAVENOUS; SUBCUTANEOUS at 12:07

## 2021-02-07 RX ADMIN — ENOXAPARIN SODIUM 40 MG: 40 INJECTION SUBCUTANEOUS at 20:11

## 2021-02-07 RX ADMIN — KETOROLAC TROMETHAMINE 15 MG: 15 INJECTION, SOLUTION INTRAMUSCULAR; INTRAVENOUS at 22:50

## 2021-02-07 RX ADMIN — KETOROLAC TROMETHAMINE 15 MG: 15 INJECTION, SOLUTION INTRAMUSCULAR; INTRAVENOUS at 10:17

## 2021-02-07 RX ADMIN — Medication 10 ML: at 20:11

## 2021-02-07 RX ADMIN — FAMOTIDINE 20 MG: 10 INJECTION INTRAVENOUS at 10:13

## 2021-02-07 RX ADMIN — BENZOCAINE: 200 SPRAY DENTAL; ORAL; PERIODONTAL at 12:05

## 2021-02-07 ASSESSMENT — PAIN SCALES - GENERAL
PAINLEVEL_OUTOF10: 3
PAINLEVEL_OUTOF10: 9
PAINLEVEL_OUTOF10: 0

## 2021-02-07 ASSESSMENT — PAIN DESCRIPTION - PAIN TYPE: TYPE: ACUTE PAIN

## 2021-02-07 NOTE — PROGRESS NOTES
[facility] OCCUPATIONAL THERAPY EVALUATION - ACUTE     NAME: Clementina Hernandez  : 1950 (69 y.o.)  MRN: [id number]  CODE STATUS: Full Code  Room: [place] [address]    Date of Service: 2021    Patient Diagnosis(es): Malignant ascites [R18.0]   Chief Complaint   Patient presents with    Fatigue     sent by dr Almaz Bueno for dehydration work up , patient has been vomiting and not keeping anything down and is very fatigued     Patient Active Problem List    Diagnosis Date Noted    Fatigue     Malignant ascites 2021        Past Medical History:   Diagnosis Date    Anxiety     panic attacks    Fibroids     has complete hysterctomy    Kidney stone     14 years ago     Past Surgical History:   Procedure Laterality Date    HYSTERECTOMY, TOTAL ABDOMINAL      PARACENTESIS Left 2021    4,440 ml removed by Dr. Colletta Buttery N/A 2021    SIGMOIDOSCOPY BIOPSY FLEXIBLE performed by Nara Mendoza MD at [facility]        Restrictions  Restrictions/Precautions: Fall Risk  Position Activity Restriction  Other position/activity restrictions: NPO     Safety Devices: Safety Devices  Safety Devices in place: Yes  Type of devices:  All fall risk precautions in place        Subjective  Pre Treatment Pain Screening  Pain at present: 0  Intervention List: Patient able to continue with treatment  Comments / Details: abdominal discomfort    Pain Reassessment:   Pain Assessment  Pain Level: 0       Prior Level of Function:  Social/Functional History  Lives With: Spouse  Type of Home: House  Home Layout: One level  Home Access: Level entry  Bathroom Shower/Tub: Walk-in shower  Bathroom Toilet: Handicap height  Bathroom Equipment: Shower chair  ADL Assistance: Independent  Homemaking Assistance: Independent  Homemaking Responsibilities: Yes  Ambulation Assistance: Independent  Transfer Assistance: Independent  Active : Yes  Occupation: Retired  Type of occupation:  Leisure & Hobbies: \"lots of hobbies\"    OBJECTIVE:     Orientation Status:  Orientation  Overall Orientation Status: Within Functional Limits    Observation:  Observation/Palpation  Observation: NG tube, IV    Cognition Status:  Cognition  Overall Cognitive Status: WNL    Perception Status:  Perception  Overall Perceptual Status: WFL    Sensation Status:  Sensation  Overall Sensation Status: WFL    Vision and Hearing Status:  Vision  Vision: Impaired(one contact on R)  Hearing  Hearing: Within functional limits     ROM:   LUE AROM (degrees)  LUE AROM : WFL  RUE AROM (degrees)  RUE AROM : WFL    Strength:  LUE Strength  Gross LUE Strength: Exceptions to WFL  LUE Strength Comment: grossly 3+/5  RUE Strength  Gross RUE Strength: Exceptions to [facility]  RUE Strength Comment: grossly 3+/5    Coordination, Tone, Quality of Movement:    Tone RUE  RUE Tone: Normotonic  Tone LUE  LUE Tone: Normotonic  Coordination  Movements Are Fluid And Coordinated: Yes    Hand Dominance:  Hand Dominance  Hand Dominance: Left    ADL Status:  ADL  Feeding: NPO  Grooming: Setup  UE Bathing: Minimal assistance(decreased endurance)  LE Bathing: Maximum assistance  UE Dressing: Minimal assistance(decreased endurance)  LE Dressing: Maximum assistance  Toileting: Unable to assess(comment)(declined need)  Toilet Transfers  Toilet Transfer: Unable to assess       Therapy key for assistance levels    Independent = Pt. is able to perform task with no assistance but may require a device   Stand by assistance = Pt. does not perform task at an independent level but does not need physical assistance, requires verbal cues  Minimal, Moderate, Maximal Assistance = Pt. requires physical assistance (25%, 50%, 75% assist from helper) for task but is able to actively participate in task   Dependent = Pt. requires total assistance with task and is not able to actively participate with task completion     Functional Mobility:  Functional Mobility How much help for Bathing?: A Lot  How much help for Toileting?: A Lot  How much help for putting on and taking off regular upper body clothing?: A Little  How much help for taking care of personal grooming?: A Little  How much help for eating meals?: A Little  AM-PAC Inpatient Daily Activity Raw Score: 15  AM-PAC Inpatient ADL T-Scale Score : 34.69  ADL Inpatient CMS 0-100% Score: 56.46    Plan:  Plan  Times per week: 1-3x/week  Times per day: Daily  Plan weeks: duration of stay  Current Treatment Recommendations: Strengthening, Endurance Training, Patient/Caregiver Education & Training, Equipment Evaluation, Education, & procurement, Self-Care / ADL, Balance Training, Home Management Training, Functional Mobility Training    Goals:   Patient will:    - Improve functional endurance to tolerate/complete 30 mins of ADL's  - Be MOD I  in UB ADLs   - Be MOD I  in LB ADLs  - Be MOD I  in ADL transfers without LOB  - Be MOD I  in toileting tasks    Patient Goal: Patient goals : to get stronger and go home     Discussed and agreed upon: Yes Comments:     Therapy Time:   OT Individual Minutes  Time In: 1425  Time Out: 2765  Minutes: 12    Eval: 12 minutes     Electronically signed by:    DONNY Valdovinos  2/7/2021, 2:58 PM

## 2021-02-07 NOTE — PROGRESS NOTES
Hospitalist Progress Note      PCP: Vipul Mclaughlin MD    Date of Admission: 2/1/2021    Chief Complaint:    Chief Complaint   Patient presents with    Fatigue     sent by dr Rosalina Hayden for dehydration work up , patient has been vomiting and not keeping anything down and is very fatigued     Subjective: The patient has her NG in place; otherwise has no complaints other than her abdominal discomfort. 12 point ROS negative other than mentioned above     Medications:  Reviewed    Infusion Medications    dextrose      PN-Adult Premix 4.25/10 - Peripheral Line 75 mL/hr at 02/06/21 1827     Scheduled Medications    insulin glargine  10 Units Subcutaneous Nightly    insulin lispro  0-6 Units Subcutaneous Q6H    famotidine (PEPCID) injection  20 mg Intravenous BID    pantoprazole  40 mg Oral QAM AC    sodium chloride flush  10 mL Intravenous 2 times per day    sodium chloride flush  10 mL Intravenous 2 times per day    spironolactone  50 mg Oral Daily    furosemide  20 mg Oral Daily    sodium chloride flush  10 mL Intravenous Once    sodium chloride flush  10 mL Intravenous 2 times per day    enoxaparin  40 mg Subcutaneous Daily    senna  2 tablet Oral Nightly    docusate sodium  100 mg Oral BID    bisacodyl  10 mg Rectal Daily     PRN Meds: glucose, dextrose, glucagon (rDNA), dextrose, potassium chloride, magnesium sulfate, sodium phosphate IVPB **OR** sodium phosphate IVPB, benzocaine, ketorolac, sodium chloride flush, sodium chloride flush, sodium chloride flush, promethazine **OR** ondansetron, acetaminophen **OR** acetaminophen      Intake/Output Summary (Last 24 hours) at 2/7/2021 1314  Last data filed at 2/7/2021 0533  Gross per 24 hour   Intake 2269 ml   Output 200 ml   Net 2069 ml     Exam:    /77   Pulse 82   Temp 97 °F (36.1 °C) (Oral)   Resp 16   Ht 5' 5\" (1.651 m)   Wt 150 lb (68 kg)   SpO2 97%   BMI 24.96 kg/m²     General appearance: NAD  HEENT: Conjunctivae/corneas clear. Neck:  Trachea midline. Respiratory:  Normal respiratory effort. Clear to auscultation  Cardiovascular: Regular rate and rhythm   Abdomen: Distended, tympanic  Musculoskeletal: No clubbing, cyanosis or edema bilaterally. Neuro: Non Focal  Capillary Refill: Brisk,< 3 seconds   Peripheral Pulses: +2 palpable, equal bilaterally     Labs:   Recent Labs     02/05/21  0605 02/06/21  0619 02/07/21  0706   WBC 10.3 11.1* 13.5*   HGB 14.1 13.9 14.2   HCT 41.8 42.1 42.9   * 382 345     Recent Labs     02/05/21  0605 02/06/21  0619 02/07/21  0706    140 134*   K 3.4 3.4 3.3*   CL 98 101 99   CO2 27 25 21   BUN 21 19 34*   CREATININE 0.81 0.83 1.27*   CALCIUM 8.4* 8.3* 8.3*   PHOS  --   --  1.9*     No results for input(s): AST, ALT, BILIDIR, BILITOT, ALKPHOS in the last 72 hours. No results for input(s): INR in the last 72 hours. No results for input(s): Cata Gaster in the last 72 hours. Urinalysis:      Lab Results   Component Value Date    NITRU Negative 02/01/2021    BLOODU Negative 02/01/2021    SPECGRAV 1.065 02/01/2021    GLUCOSEU Negative 02/01/2021     Radiology:  XR CHEST ABDOMEN NG PLACEMENT   Final Result   INTERVAL PLACEMENT OF NASOGASTRIC TUBE AS DESCRIBED ABOVE      XR ABDOMEN (KUB) (SINGLE AP VIEW)   Final Result   DISTENDED DILATED LOOPS OF LARGE BOWEL WITH A TRANSITION POINT IN THE REGION OF THE DESCENDING COLON, SIGMOID. FINDINGS COULD SUGGEST THAT OF POSSIBLE HIGH-GRADE OBSTRUCTION. FURTHER EVALUATION IS WARRANTED. US PELVIS COMPLETE   Preliminary Result   FREE FLUID WITHIN THE PELVIS. EXAMINATION:  US DUP ABD PEL RETRO SCROT COMPLETE      CLINICAL HISTORY: Abnormal CT scan. COMPARISONS:  NONE AVAILABLE      TECHNIQUE:  Transabdominal ultrasound of the right upper quadrant with both duplex color ultrasound and spectral Doppler ultrasound with interrogation of the hepatic and portal venous system was performed.        FINDINGS: The visualized portion of the right lobe of the liver shows no focal parenchymal abnormalities. The hypoechoic area seen at the inferior medial aspect of the right lobe of liver on CT is not appreciated. Within the field-of-view there are findings of cholelithiasis. There is ascites present. The hepatic veins and portal veins are patent. Normal direction seen within the portal veins and hepatic vein. IMPRESSION:        1. ASCITES IS PRESENT WITHIN THE FIELD-OF-VIEW. 2. CHOLELITHIASIS. 3. UNREMARKABLE SONOGRAPHIC EXAMINATION OF THE HEPATIC AND PORTAL VEIN. US DUP ABD PEL RETRO SCROT COMPLETE   Preliminary Result   FREE FLUID WITHIN THE PELVIS. EXAMINATION:  US DUP ABD PEL RETRO SCROT COMPLETE      CLINICAL HISTORY: Abnormal CT scan. COMPARISONS:  NONE AVAILABLE      TECHNIQUE:  Transabdominal ultrasound of the right upper quadrant with both duplex color ultrasound and spectral Doppler ultrasound with interrogation of the hepatic and portal venous system was performed. FINDINGS:     The visualized portion of the right lobe of the liver shows no focal parenchymal abnormalities. The hypoechoic area seen at the inferior medial aspect of the right lobe of liver on CT is not appreciated. Within the field-of-view there are findings of cholelithiasis. There is ascites present. The hepatic veins and portal veins are patent. Normal direction seen within the portal veins and hepatic vein. IMPRESSION:        1. ASCITES IS PRESENT WITHIN THE FIELD-OF-VIEW. 2. CHOLELITHIASIS. 3. UNREMARKABLE SONOGRAPHIC EXAMINATION OF THE HEPATIC AND PORTAL VEIN. US GUIDED PARACENTESIS   Final Result   1. Status post technically successful ultrasound-guided paracentesis. George Gibson is a Female of 69 years age, referred for Ultrasound Guided Paracentesis. PROCEDURE: Survey of the abdomen showed large amount of ascites fluid. After obtaining informed consent, the patient was positioned supine on the sonography table. Using ultrasound, the skin over the left hemiabdomen was locally anesthetized with 1% lidocaine. Following that, a Yueh needle was advanced into the fluid    pocket using ultrasound visualization. 4440cc, of clear yellow fluid were aspirated and sent for cytology, and pathology. The needle was removed, and hemostasis was obtained with pressure. A Band-Aid was placed. Post procedure images did not demonstrate hemorrhage at the target site. The patient tolerated the procedure well. The patient left the department in good condition. A radiology nurse was in presence monitoring vital signs, assisting throughout the procedure. US ABDOMEN LIMITED   Final Result   The portal veins, hepatic vein, and common hepatic artery are patent with normal direction of blood flow. No lesions are seen in the right liver lobe, though ultrasound is limited, and if there is clinical suspicion, MRI of the liver can be    obtained. CT ABDOMEN PELVIS W IV CONTRAST Additional Contrast? None   Final Result      Thickening and nodularity of the omentum concerning for peritoneal metastasis or peritonitis. A large amount of ascites is present. Two ill-defined lesions within the right lobe of the liver measuring 1 cm and 2 cm respectively are nonspecific but most concerning for metastatic lesions. Mild perivesicular inflammation. Correlation with urinalysis recommended to exclude cystitis. Large amount stool within the rectosigmoid colon may represent constipation and/or fecal impaction. Subtle nodularity of the liver suggests cirrhosis. Mild right-sided hydronephrosis without radiopaque calculus. Debris within the gallbladder likely represents gallstones and/or gallbladder sludge. Small right pleural effusion. All CT scans at this facility use dose modulation, iterative reconstruction, and/or weight based dosing when appropriate to reduce radiation dose to as low as reasonably achievable. MRI ABDOMEN W WO CONTRAST MRCP    (Results Pending)   FL BARIUM ENEMA    (Results Pending)   FL SMALL BOWEL FOLLOW THROUGH ONLY    (Results Pending)     Assessment/Plan:    1. 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 Possibly pancreatic vs cholangiocarcinoma based on CA 19-9; plan for MRCP when more stable; other sources being worked up by oncology  2. High grade obstruction of descending colon: Will need colectomy; patient is deciding; if she is agreeable then plan for OR tomorrow at 3pm  3. Right sided pleural effusion:  Likely from the ascites; ascites drained  4. Functional Status: Fall precautions. Up with assistance. PT OT  5. Diet: NPO  6. DVT ppx: Lovenox SCDs  7. Disposition: Dependent on hospital course. Will discharge once medically stable. SW on board for discharge planning. Active Hospital Problems    Diagnosis Date Noted    Fatigue [R53.83]     Malignant ascites [R18.0] 02/01/2021     Additional work up or/and treatment plan may be added today or then after based on clinical progression. I am managing a portion of pt care. Some medical issues are handled by other specialists. Additional work up and treatment should be done in out pt setting by pt PCP and other out pt providers. In addition to examining and evaluating pt, I spent additional time explaining care, normal and abnormal findings, and treatment plan. All of pt questions were answered. Counseling, diet and education were  provided. Case will be discussed with nursing staff when appropriate. Family will be updated if and when appropriate.       Diet: Diet NPO Time Specified Exceptions are: Ice Chips  PN-Adult Premix 4.25/10 - Peripheral Line    Code Status: Full Code    PT/OT Laura Electronically signed by Ana Arora MD on 2/7/2021 at 1:14 PM

## 2021-02-07 NOTE — PROGRESS NOTES
Physician Progress Note      PATIENT:               Pasquale ALMEIDA #:                  [id number]  :                       1950  ADMIT DATE:       2021 1:30 PM  [address] [place] DATE:  RESPONDING  PROVIDER #:        Bill Cho MD          QUERY TEXT:    Patient admitted with constipation, noted on  Abd XR to have possible   high-grade obstruction at the transition point in the region of the descending   colon & sigmoid along with documentation of possible abdominal   carcinomatosis. If possible, please document in progress notes and discharge   summary if you are evaluating and/or treating any of the following: The medical record reflects the following:  Risk Factors: elevated   Clinical Indicators: abd pain, constipation, 20# weight loss, nausea,   vomiting, abdominal distention w/ascites; XR abd: possible high-grade   obstruction at the transition point in the region of the descending colon &   sigmoid; Dr. Valentino Fusi  PN: \"Suspected metastatic cancer with malignant   ascites; possible abdominal carcinomatosis:  s/p paracentesis with 4L removed. Unclear etiology still. Recalculated SAAG based on this mornings LFTs   (added on) and SAAG was 0.7 suggestive of possible exudative effusion;   oncology to see patient today  2. Small bowel obstruction:  Gen surg co  Treatment: Surgery & GI consults, NPO, rectal suppository, paracentesis, CA   markers, XR, CT, labs and monitoring    If you have any questions, please feel free to contact me at [phone]. Thank you,  Severa Gates RN BSN CDS  Options provided:  -- high-grade obstruction of the descending colon due to constipation  -- high-grade obstruction of the descending colon due to abdominal   carcinomatosis.   -- High-grade obstruction of descending colon ruled out, constipation only  -- Other - I will add my own diagnosis  -- Disagree - Not applicable / Not valid  -- Disagree - Clinically unable to determine / Unknown -- Refer to Clinical Documentation Reviewer    PROVIDER RESPONSE TEXT:    High grade obstruction of the descending colon; etiology undetermined at this   time    Query created by: Anders Aase on 2/5/2021 10:31 AM      Electronically signed by:  Margot Cox MD 2/7/2021 1:14 PM

## 2021-02-07 NOTE — PROGRESS NOTES
Physical Therapy Med Surg Initial Assessment  Facility/Department:  [facility] UNIT  Room: [place] [address]       NAME: Lluvia Wisdom  : 1950 (69 y.o.)  MRN: [id number]  CODE STATUS: Full Code    Date of Service: 2021    Patient Diagnosis(es): Malignant ascites [R18.0]   Chief Complaint   Patient presents with    Fatigue     sent by dr Rafita Delarosa for dehydration work up , patient has been vomiting and not keeping anything down and is very fatigued     Patient Active Problem List    Diagnosis Date Noted    Fatigue     Malignant ascites 2021        Past Medical History:   Diagnosis Date    Anxiety     panic attacks    Fibroids     has complete hysterctomy    Kidney stone     14 years ago     Past Surgical History:   Procedure Laterality Date    HYSTERECTOMY, TOTAL ABDOMINAL      PARACENTESIS Left 2021    4,440 ml removed by Dr. Cirilo Gong 2021    SIGMOIDOSCOPY BIOPSY FLEXIBLE performed by Tracey Stein MD at [facility]       Chart Reviewed: Yes  Patient assessed for rehabilitation services?: Yes  Family / Caregiver Present: No  General Comment  Comments: pt fatigued; PT/OT co-eval    Restrictions:  Restrictions/Precautions: Fall Risk(mod short score; high fall risk per clinical judgement; HOB >30 deg- NG tube)  Position Activity Restriction  Other position/activity restrictions: NPO     SUBJECTIVE: Subjective:  \"Im having surgery soon\"    Pain  Pre Treatment Pain Screening  Pain at present: 0  Intervention List: Patient able to continue with treatment    Post Treatment Pain Screening:   Pain Assessment  Pain Level: 0    Prior Level of Function:  Social/Functional History  Lives With: Spouse  Type of Home: House  Home Layout: One level  Home Access: Level entry  Bathroom Shower/Tub: Walk-in shower  Bathroom Toilet: Handicap height  Bathroom Equipment: Shower chair  ADL Assistance: Independent  Homemaking Assistance: Independent Homemaking Responsibilities: Yes  Ambulation Assistance: Independent  Transfer Assistance: Independent  Active : Yes  Occupation: Retired  Type of occupation:     OBJECTIVE:   Vision: Impaired(one contact on R)  Hearing: Within functional limits    Cognition:  Overall Orientation Status: Within Functional Limits  Follows Commands: Within Functional Limits    Observation/Palpation  Observation: NG tube; IV; no SOB with activity(Simultaneous filing. User may not have seen previous data.)    ROM:  RLE AROM: WFL  LLE AROM : WFL    Strength:  Strength RLE  Comment: grossly 4/5  Strength LLE  Comment: grossly 4/5    Neuro:  Balance  Posture: Fair(min trunk flexion in standing)  Sitting - Static: Good  Sitting - Dynamic: Good  Standing - Static: Fair  Standing - Dynamic: Fair(CGA to take side steps)     Tone RLE  RLE Tone: Normotonic  Tone LLE  LLE Tone: Normotonic  Motor Control  Gross Motor?: WFL  Sensation  Overall Sensation Status: WFL    Bed mobility  Supine to Sit: Stand by assistance  Sit to Supine: Stand by assistance  Comment: HOB elevated due to NG tube; increased time required to complete due to fatigue and weakness    Transfers  Sit to Stand: Contact guard assistance  Stand to sit: Contact guard assistance  Comment: no AD    Ambulation  Ambulation?: Yes  Ambulation 1  Surface: level tile  Device: No Device  Assistance: Contact guard assistance  Quality of Gait: maintains min trunk flexion in standing  Gait Deviations: Slow Christal  Distance: 3 side steps toward head of bed    Activity Tolerance  Activity Tolerance: Patient limited by fatigue;Patient limited by endurance    PT Education  PT Education: Goals;PT Role;Plan of Care;Transfer Training;General Safety;Gait Training    ASSESSMENT:   Body structures, Functions, Activity limitations: Decreased functional mobility ; Decreased balance;Decreased ADL status; Decreased endurance;Decreased strength;Decreased posture Stand by assistance = pt requires verbal cues or instructions from another person, close to but not touching, to perform the activity  Minimal assistance= pt performs 75% or more of the activity; assistance is required to complete the activity  Moderate assistance= pt performs 50% of the activity; assistance is required to complete the activity  Maximal assistance = pt performs 25% of the activity; assistance is required to complete the activity  Dependent = pt requires total physical assistance to accomplish the task

## 2021-02-07 NOTE — FLOWSHEET NOTE
Pt has NG, barium clogged in NG tube, pt nauseated, scant amount of emesis. NG tube flushed and Zofran administered. Pt given Toradol for 4/10 abdominal pain. Abdomen is taut and distended. Fall protocol in place, call light within reach.

## 2021-02-07 NOTE — PROGRESS NOTES
Patient seen and examined with her  at the bedside. I went over all aspects of care and so far what we know diagnostically and reviewed all the imaging with the couple. She came in with a CAT scan which showed what appears to be malignant ascites with peritoneal carcinomatosis. I suspected this was colonic primary based on a CAT scan finding of a obstruction near the sigmoid. A flexible sigmoidoscopy was nondiagnostic. She had a paracentesis which I believe shows cells consistent with adenocarcinoma but final results are pending. She has had an oncology consultation which is still in the process of work-up for potential primary site. She had a barium enema which was not tolerated. She had an small bowel follow-through which did not show any evidence of small bowel obstruction but shows persistent colonic obstruction near the sigmoid colon in the area of CAT scan which was suspicious to me. She has persistent abdominal distention and no flatus or bowel function. Her abdomen remains distended. Her nasogastric tube does not put out significant fluid. This is because most of the distention is related to her colon which is obstructed near the sigmoid. I reviewed all her cancer markers that were present on blood work. My purpose was to give her all the information possible for the best decision she could make. I talked to her about peritoneal carcinomatosis and discussed briefly the prognosis which is usually not good. There are patients who do respond to chemotherapy but the overall prognosis is poor. I offered a laparotomy with diverting end colostomy along with gastrostomy tube and drains for assistance with malignant ascites. I discussed all the risks of this operation including survival as well as wound complications and drain issues. There are often colostomy issues with large bowel obstructions due to its overall size and poor perfusion. I went over all the studies and talked about prognosis even though it is never a definite. I am here to help her palliatively and hopefully diagnostically. All questions were answered. She needs time to think about all the information given to her. If she wishes to proceed, I would do this tomorrow. I will wait to hear if they have any further questions or how they would like to proceed.

## 2021-02-08 ENCOUNTER — ANESTHESIA (OUTPATIENT)
Dept: OPERATING ROOM | Age: 71
DRG: 981 | End: 2021-02-08
Payer: MEDICARE

## 2021-02-08 ENCOUNTER — ANESTHESIA EVENT (OUTPATIENT)
Dept: OPERATING ROOM | Age: 71
DRG: 981 | End: 2021-02-08
Payer: MEDICARE

## 2021-02-08 VITALS — SYSTOLIC BLOOD PRESSURE: 103 MMHG | DIASTOLIC BLOOD PRESSURE: 58 MMHG | TEMPERATURE: 93.7 F | OXYGEN SATURATION: 100 %

## 2021-02-08 LAB
ABO/RH: NORMAL
ANION GAP SERPL CALCULATED.3IONS-SCNC: 13 MEQ/L (ref 9–15)
ANTIBODY SCREEN: NORMAL
ATYPICAL LYMPHOCYTE RELATIVE PERCENT: 1 %
BASOPHILS ABSOLUTE: 0 K/UL (ref 0–0.2)
BASOPHILS RELATIVE PERCENT: 0.2 %
BUN BLDV-MCNC: 63 MG/DL (ref 8–23)
CALCIUM SERPL-MCNC: 8.3 MG/DL (ref 8.5–9.9)
CHLORIDE BLD-SCNC: 96 MEQ/L (ref 95–107)
CO2: 18 MEQ/L (ref 20–31)
CREAT SERPL-MCNC: 1.85 MG/DL (ref 0.5–0.9)
EKG ATRIAL RATE: 88 BPM
EKG P-R INTERVAL: 98 MS
EKG Q-T INTERVAL: 380 MS
EKG QRS DURATION: 82 MS
EKG QTC CALCULATION (BAZETT): 459 MS
EKG R AXIS: 16 DEGREES
EKG T AXIS: 33 DEGREES
EKG VENTRICULAR RATE: 88 BPM
EOSINOPHILS ABSOLUTE: 0 K/UL (ref 0–0.7)
EOSINOPHILS RELATIVE PERCENT: 0.2 %
GFR AFRICAN AMERICAN: 32.4
GFR NON-AFRICAN AMERICAN: 26.8
GLUCOSE BLD-MCNC: 165 MG/DL (ref 60–115)
GLUCOSE BLD-MCNC: 175 MG/DL (ref 60–115)
GLUCOSE BLD-MCNC: 189 MG/DL (ref 60–115)
GLUCOSE BLD-MCNC: 233 MG/DL (ref 70–99)
HCT VFR BLD CALC: 41.8 % (ref 37–47)
HEMOGLOBIN: 13.9 G/DL (ref 12–16)
LYMPHOCYTES ABSOLUTE: 1.7 K/UL (ref 1–4.8)
LYMPHOCYTES RELATIVE PERCENT: 10 %
MAGNESIUM: 2 MG/DL (ref 1.7–2.4)
MCH RBC QN AUTO: 29.5 PG (ref 27–31.3)
MCHC RBC AUTO-ENTMCNC: 33.3 % (ref 33–37)
MCV RBC AUTO: 88.6 FL (ref 82–100)
MONOCYTES ABSOLUTE: 0.6 K/UL (ref 0.2–0.8)
MONOCYTES RELATIVE PERCENT: 3.8 %
NEUTROPHILS ABSOLUTE: 13 K/UL (ref 1.4–6.5)
NEUTROPHILS RELATIVE PERCENT: 86 %
PDW BLD-RTO: 13.6 % (ref 11.5–14.5)
PERFORMED ON: ABNORMAL
PLATELET # BLD: 297 K/UL (ref 130–400)
PLATELET SLIDE REVIEW: NORMAL
POTASSIUM REFLEX MAGNESIUM: 3.4 MEQ/L (ref 3.4–4.9)
RBC # BLD: 4.72 M/UL (ref 4.2–5.4)
RBC # BLD: NORMAL 10*6/UL
SODIUM BLD-SCNC: 127 MEQ/L (ref 135–144)
WBC # BLD: 15.1 K/UL (ref 4.8–10.8)

## 2021-02-08 PROCEDURE — 85025 COMPLETE CBC W/AUTO DIFF WBC: CPT

## 2021-02-08 PROCEDURE — 6370000000 HC RX 637 (ALT 250 FOR IP): Performed by: INTERNAL MEDICINE

## 2021-02-08 PROCEDURE — 2500000003 HC RX 250 WO HCPCS: Performed by: COLON & RECTAL SURGERY

## 2021-02-08 PROCEDURE — 7100000001 HC PACU RECOVERY - ADDTL 15 MIN: Performed by: COLON & RECTAL SURGERY

## 2021-02-08 PROCEDURE — 0DH60UZ INSERTION OF FEEDING DEVICE INTO STOMACH, OPEN APPROACH: ICD-10-PCS | Performed by: COLON & RECTAL SURGERY

## 2021-02-08 PROCEDURE — 2580000003 HC RX 258: Performed by: ANESTHESIOLOGY

## 2021-02-08 PROCEDURE — 2500000003 HC RX 250 WO HCPCS: Performed by: INTERNAL MEDICINE

## 2021-02-08 PROCEDURE — 2700000000 HC OXYGEN THERAPY PER DAY

## 2021-02-08 PROCEDURE — 80048 BASIC METABOLIC PNL TOTAL CA: CPT

## 2021-02-08 PROCEDURE — 6360000002 HC RX W HCPCS: Performed by: NURSE ANESTHETIST, CERTIFIED REGISTERED

## 2021-02-08 PROCEDURE — 2720000010 HC SURG SUPPLY STERILE: Performed by: COLON & RECTAL SURGERY

## 2021-02-08 PROCEDURE — 3600000004 HC SURGERY LEVEL 4 BASE: Performed by: COLON & RECTAL SURGERY

## 2021-02-08 PROCEDURE — 0W9G3ZZ DRAINAGE OF PERITONEAL CAVITY, PERCUTANEOUS APPROACH: ICD-10-PCS | Performed by: COLON & RECTAL SURGERY

## 2021-02-08 PROCEDURE — 88342 IMHCHEM/IMCYTCHM 1ST ANTB: CPT

## 2021-02-08 PROCEDURE — 2580000003 HC RX 258: Performed by: SPECIALIST

## 2021-02-08 PROCEDURE — 6360000002 HC RX W HCPCS: Performed by: INTERNAL MEDICINE

## 2021-02-08 PROCEDURE — 2500000003 HC RX 250 WO HCPCS: Performed by: NURSE ANESTHETIST, CERTIFIED REGISTERED

## 2021-02-08 PROCEDURE — 88341 IMHCHEM/IMCYTCHM EA ADD ANTB: CPT

## 2021-02-08 PROCEDURE — 2580000003 HC RX 258: Performed by: NURSE ANESTHETIST, CERTIFIED REGISTERED

## 2021-02-08 PROCEDURE — 2709999900 HC NON-CHARGEABLE SUPPLY: Performed by: COLON & RECTAL SURGERY

## 2021-02-08 PROCEDURE — 36415 COLL VENOUS BLD VENIPUNCTURE: CPT

## 2021-02-08 PROCEDURE — 2580000003 HC RX 258: Performed by: COLON & RECTAL SURGERY

## 2021-02-08 PROCEDURE — 6360000002 HC RX W HCPCS: Performed by: ANESTHESIOLOGY

## 2021-02-08 PROCEDURE — 99221 1ST HOSP IP/OBS SF/LOW 40: CPT | Performed by: NURSE PRACTITIONER

## 2021-02-08 PROCEDURE — 86850 RBC ANTIBODY SCREEN: CPT

## 2021-02-08 PROCEDURE — 88307 TISSUE EXAM BY PATHOLOGIST: CPT

## 2021-02-08 PROCEDURE — 94761 N-INVAS EAR/PLS OXIMETRY MLT: CPT

## 2021-02-08 PROCEDURE — 44141 PARTIAL REMOVAL OF COLON: CPT | Performed by: COLON & RECTAL SURGERY

## 2021-02-08 PROCEDURE — 86900 BLOOD TYPING SEROLOGIC ABO: CPT

## 2021-02-08 PROCEDURE — 86901 BLOOD TYPING SEROLOGIC RH(D): CPT

## 2021-02-08 PROCEDURE — 0D1B0Z4 BYPASS ILEUM TO CUTANEOUS, OPEN APPROACH: ICD-10-PCS | Performed by: COLON & RECTAL SURGERY

## 2021-02-08 PROCEDURE — 44139 MOBILIZATION OF COLON: CPT | Performed by: COLON & RECTAL SURGERY

## 2021-02-08 PROCEDURE — 7100000000 HC PACU RECOVERY - FIRST 15 MIN: Performed by: COLON & RECTAL SURGERY

## 2021-02-08 PROCEDURE — 3700000000 HC ANESTHESIA ATTENDED CARE: Performed by: COLON & RECTAL SURGERY

## 2021-02-08 PROCEDURE — 6360000002 HC RX W HCPCS: Performed by: COLON & RECTAL SURGERY

## 2021-02-08 PROCEDURE — 83735 ASSAY OF MAGNESIUM: CPT

## 2021-02-08 PROCEDURE — 2580000003 HC RX 258

## 2021-02-08 PROCEDURE — 2000000000 HC ICU R&B

## 2021-02-08 PROCEDURE — 88305 TISSUE EXAM BY PATHOLOGIST: CPT

## 2021-02-08 PROCEDURE — 93005 ELECTROCARDIOGRAM TRACING: CPT | Performed by: ANESTHESIOLOGY

## 2021-02-08 PROCEDURE — 99213 OFFICE O/P EST LOW 20 MIN: CPT

## 2021-02-08 PROCEDURE — 2580000003 HC RX 258: Performed by: INTERNAL MEDICINE

## 2021-02-08 PROCEDURE — 3700000001 HC ADD 15 MINUTES (ANESTHESIA): Performed by: COLON & RECTAL SURGERY

## 2021-02-08 PROCEDURE — 3600000014 HC SURGERY LEVEL 4 ADDTL 15MIN: Performed by: COLON & RECTAL SURGERY

## 2021-02-08 RX ORDER — SUCCINYLCHOLINE/SOD CL,ISO/PF 100 MG/5ML
SYRINGE (ML) INTRAVENOUS PRN
Status: DISCONTINUED | OUTPATIENT
Start: 2021-02-08 | End: 2021-02-08 | Stop reason: SDUPTHER

## 2021-02-08 RX ORDER — ONDANSETRON 2 MG/ML
4 INJECTION INTRAMUSCULAR; INTRAVENOUS
Status: COMPLETED | OUTPATIENT
Start: 2021-02-08 | End: 2021-02-08

## 2021-02-08 RX ORDER — 0.9 % SODIUM CHLORIDE 0.9 %
1000 INTRAVENOUS SOLUTION INTRAVENOUS ONCE
Status: COMPLETED | OUTPATIENT
Start: 2021-02-08 | End: 2021-02-09

## 2021-02-08 RX ORDER — ONDANSETRON 2 MG/ML
INJECTION INTRAMUSCULAR; INTRAVENOUS PRN
Status: DISCONTINUED | OUTPATIENT
Start: 2021-02-08 | End: 2021-02-08 | Stop reason: SDUPTHER

## 2021-02-08 RX ORDER — NALOXONE HYDROCHLORIDE 0.4 MG/ML
0.4 INJECTION, SOLUTION INTRAMUSCULAR; INTRAVENOUS; SUBCUTANEOUS PRN
Status: DISCONTINUED | OUTPATIENT
Start: 2021-02-08 | End: 2021-02-10

## 2021-02-08 RX ORDER — SODIUM CHLORIDE 9 MG/ML
INJECTION, SOLUTION INTRAVENOUS CONTINUOUS
Status: DISCONTINUED | OUTPATIENT
Start: 2021-02-08 | End: 2021-02-09

## 2021-02-08 RX ORDER — MEPERIDINE HYDROCHLORIDE 25 MG/ML
12.5 INJECTION INTRAMUSCULAR; INTRAVENOUS; SUBCUTANEOUS EVERY 5 MIN PRN
Status: DISCONTINUED | OUTPATIENT
Start: 2021-02-08 | End: 2021-02-08 | Stop reason: HOSPADM

## 2021-02-08 RX ORDER — SODIUM CHLORIDE 9 MG/ML
INJECTION, SOLUTION INTRAVENOUS
Status: COMPLETED
Start: 2021-02-08 | End: 2021-02-08

## 2021-02-08 RX ORDER — CIPROFLOXACIN 2 MG/ML
400 INJECTION, SOLUTION INTRAVENOUS ONCE
Status: COMPLETED | OUTPATIENT
Start: 2021-02-08 | End: 2021-02-08

## 2021-02-08 RX ORDER — EPHEDRINE SULFATE/0.9% NACL/PF 50 MG/5 ML
SYRINGE (ML) INTRAVENOUS PRN
Status: DISCONTINUED | OUTPATIENT
Start: 2021-02-08 | End: 2021-02-08 | Stop reason: SDUPTHER

## 2021-02-08 RX ORDER — SODIUM CHLORIDE 9 MG/ML
INJECTION, SOLUTION INTRAVENOUS CONTINUOUS
Status: DISCONTINUED | OUTPATIENT
Start: 2021-02-08 | End: 2021-02-08

## 2021-02-08 RX ORDER — SODIUM CHLORIDE 9 MG/ML
INJECTION, SOLUTION INTRAVENOUS CONTINUOUS PRN
Status: DISCONTINUED | OUTPATIENT
Start: 2021-02-08 | End: 2021-02-08 | Stop reason: SDUPTHER

## 2021-02-08 RX ORDER — FENTANYL CITRATE 50 UG/ML
INJECTION, SOLUTION INTRAMUSCULAR; INTRAVENOUS PRN
Status: DISCONTINUED | OUTPATIENT
Start: 2021-02-08 | End: 2021-02-08 | Stop reason: SDUPTHER

## 2021-02-08 RX ORDER — MAGNESIUM HYDROXIDE 1200 MG/15ML
LIQUID ORAL CONTINUOUS PRN
Status: COMPLETED | OUTPATIENT
Start: 2021-02-08 | End: 2021-02-08

## 2021-02-08 RX ORDER — NALOXONE HYDROCHLORIDE 0.4 MG/ML
0.4 INJECTION, SOLUTION INTRAMUSCULAR; INTRAVENOUS; SUBCUTANEOUS PRN
Status: DISCONTINUED | OUTPATIENT
Start: 2021-02-08 | End: 2021-02-08

## 2021-02-08 RX ORDER — DEXAMETHASONE SODIUM PHOSPHATE 10 MG/ML
INJECTION INTRAMUSCULAR; INTRAVENOUS PRN
Status: DISCONTINUED | OUTPATIENT
Start: 2021-02-08 | End: 2021-02-08 | Stop reason: SDUPTHER

## 2021-02-08 RX ORDER — HYDROCODONE BITARTRATE AND ACETAMINOPHEN 5; 325 MG/1; MG/1
2 TABLET ORAL PRN
Status: DISCONTINUED | OUTPATIENT
Start: 2021-02-08 | End: 2021-02-08 | Stop reason: HOSPADM

## 2021-02-08 RX ORDER — FENTANYL CITRATE 50 UG/ML
50 INJECTION, SOLUTION INTRAMUSCULAR; INTRAVENOUS EVERY 10 MIN PRN
Status: DISCONTINUED | OUTPATIENT
Start: 2021-02-08 | End: 2021-02-08 | Stop reason: HOSPADM

## 2021-02-08 RX ORDER — METOCLOPRAMIDE HYDROCHLORIDE 5 MG/ML
10 INJECTION INTRAMUSCULAR; INTRAVENOUS
Status: COMPLETED | OUTPATIENT
Start: 2021-02-08 | End: 2021-02-08

## 2021-02-08 RX ORDER — DIPHENHYDRAMINE HYDROCHLORIDE 50 MG/ML
12.5 INJECTION INTRAMUSCULAR; INTRAVENOUS
Status: DISCONTINUED | OUTPATIENT
Start: 2021-02-08 | End: 2021-02-08 | Stop reason: HOSPADM

## 2021-02-08 RX ORDER — ROCURONIUM BROMIDE 10 MG/ML
INJECTION, SOLUTION INTRAVENOUS PRN
Status: DISCONTINUED | OUTPATIENT
Start: 2021-02-08 | End: 2021-02-08 | Stop reason: SDUPTHER

## 2021-02-08 RX ORDER — HYDROCODONE BITARTRATE AND ACETAMINOPHEN 5; 325 MG/1; MG/1
1 TABLET ORAL PRN
Status: DISCONTINUED | OUTPATIENT
Start: 2021-02-08 | End: 2021-02-08 | Stop reason: HOSPADM

## 2021-02-08 RX ADMIN — POTASSIUM CHLORIDE 10 MEQ: 7.46 INJECTION, SOLUTION INTRAVENOUS at 12:08

## 2021-02-08 RX ADMIN — PHENYLEPHRINE HYDROCHLORIDE 10 MCG/MIN: 10 INJECTION INTRAVENOUS at 14:20

## 2021-02-08 RX ADMIN — PHENYLEPHRINE HYDROCHLORIDE 100 MCG: 10 INJECTION INTRAVENOUS at 14:07

## 2021-02-08 RX ADMIN — PHENYLEPHRINE HYDROCHLORIDE 100 MCG: 10 INJECTION INTRAVENOUS at 14:09

## 2021-02-08 RX ADMIN — PHENYLEPHRINE HYDROCHLORIDE 100 MCG: 10 INJECTION INTRAVENOUS at 14:06

## 2021-02-08 RX ADMIN — DEXAMETHASONE SODIUM PHOSPHATE 10 MG: 10 INJECTION INTRAMUSCULAR; INTRAVENOUS at 14:17

## 2021-02-08 RX ADMIN — SODIUM CHLORIDE: 9 INJECTION, SOLUTION INTRAVENOUS at 16:08

## 2021-02-08 RX ADMIN — POTASSIUM CHLORIDE 10 MEQ: 7.46 INJECTION, SOLUTION INTRAVENOUS at 08:57

## 2021-02-08 RX ADMIN — CIPROFLOXACIN 400 MG: 2 INJECTION, SOLUTION INTRAVENOUS at 14:13

## 2021-02-08 RX ADMIN — Medication 100 MG: at 14:02

## 2021-02-08 RX ADMIN — SODIUM CHLORIDE: 9 INJECTION, SOLUTION INTRAVENOUS at 14:50

## 2021-02-08 RX ADMIN — KETOROLAC TROMETHAMINE 15 MG: 15 INJECTION, SOLUTION INTRAMUSCULAR; INTRAVENOUS at 11:05

## 2021-02-08 RX ADMIN — SODIUM CHLORIDE: 9 INJECTION, SOLUTION INTRAVENOUS at 13:52

## 2021-02-08 RX ADMIN — PHENYLEPHRINE HYDROCHLORIDE 100 MCG: 10 INJECTION INTRAVENOUS at 15:16

## 2021-02-08 RX ADMIN — SODIUM CHLORIDE: 9 INJECTION, SOLUTION INTRAVENOUS at 13:41

## 2021-02-08 RX ADMIN — SODIUM CHLORIDE: 9 INJECTION, SOLUTION INTRAVENOUS at 22:10

## 2021-02-08 RX ADMIN — METRONIDAZOLE 500 MG: 500 INJECTION, SOLUTION INTRAVENOUS at 14:04

## 2021-02-08 RX ADMIN — Medication 10 MG: at 17:08

## 2021-02-08 RX ADMIN — PHENYLEPHRINE HYDROCHLORIDE 100 MCG: 10 INJECTION INTRAVENOUS at 14:12

## 2021-02-08 RX ADMIN — PHENYLEPHRINE HYDROCHLORIDE 200 MCG: 10 INJECTION INTRAVENOUS at 14:16

## 2021-02-08 RX ADMIN — ROCURONIUM BROMIDE 10 MG: 10 INJECTION INTRAVENOUS at 14:43

## 2021-02-08 RX ADMIN — Medication 10 ML: at 22:15

## 2021-02-08 RX ADMIN — SODIUM CHLORIDE, PRESERVATIVE FREE 10 ML: 5 INJECTION INTRAVENOUS at 22:14

## 2021-02-08 RX ADMIN — PHENYLEPHRINE HYDROCHLORIDE 100 MCG: 10 INJECTION INTRAVENOUS at 14:10

## 2021-02-08 RX ADMIN — KETOROLAC TROMETHAMINE 15 MG: 15 INJECTION, SOLUTION INTRAMUSCULAR; INTRAVENOUS at 05:05

## 2021-02-08 RX ADMIN — PHENYLEPHRINE HYDROCHLORIDE 200 MCG: 10 INJECTION INTRAVENOUS at 14:08

## 2021-02-08 RX ADMIN — PHENYLEPHRINE HYDROCHLORIDE 100 MCG: 10 INJECTION INTRAVENOUS at 14:05

## 2021-02-08 RX ADMIN — POTASSIUM CHLORIDE 10 MEQ: 7.46 INJECTION, SOLUTION INTRAVENOUS at 14:18

## 2021-02-08 RX ADMIN — ROCURONIUM BROMIDE 50 MG: 10 INJECTION INTRAVENOUS at 14:10

## 2021-02-08 RX ADMIN — PHENYLEPHRINE HYDROCHLORIDE 100 MCG: 10 INJECTION INTRAVENOUS at 14:04

## 2021-02-08 RX ADMIN — PHENYLEPHRINE HYDROCHLORIDE 100 MCG: 10 INJECTION INTRAVENOUS at 14:14

## 2021-02-08 RX ADMIN — BISACODYL 10 MG: 10 SUPPOSITORY RECTAL at 09:10

## 2021-02-08 RX ADMIN — SODIUM CHLORIDE 1000 ML: 9 INJECTION, SOLUTION INTRAVENOUS at 23:18

## 2021-02-08 RX ADMIN — POTASSIUM CHLORIDE 10 MEQ: 7.46 INJECTION, SOLUTION INTRAVENOUS at 10:09

## 2021-02-08 RX ADMIN — SODIUM CHLORIDE: 9 INJECTION, SOLUTION INTRAVENOUS at 15:01

## 2021-02-08 RX ADMIN — SODIUM CHLORIDE 1000 ML: 9 INJECTION, SOLUTION INTRAVENOUS at 11:03

## 2021-02-08 RX ADMIN — SUGAMMADEX 200 MG: 100 INJECTION, SOLUTION INTRAVENOUS at 15:58

## 2021-02-08 RX ADMIN — ONDANSETRON 4 MG: 2 INJECTION INTRAMUSCULAR; INTRAVENOUS at 11:05

## 2021-02-08 RX ADMIN — FENTANYL CITRATE 50 MCG: 50 INJECTION, SOLUTION INTRAMUSCULAR; INTRAVENOUS at 14:02

## 2021-02-08 RX ADMIN — FAMOTIDINE 20 MG: 10 INJECTION INTRAVENOUS at 22:11

## 2021-02-08 RX ADMIN — ONDANSETRON 4 MG: 2 INJECTION INTRAMUSCULAR; INTRAVENOUS at 16:22

## 2021-02-08 RX ADMIN — Medication 10 ML: at 08:56

## 2021-02-08 RX ADMIN — Medication 20 MG: at 14:16

## 2021-02-08 RX ADMIN — METOCLOPRAMIDE HYDROCHLORIDE 10 MG: 5 INJECTION INTRAMUSCULAR; INTRAVENOUS at 16:34

## 2021-02-08 RX ADMIN — ONDANSETRON 4 MG: 2 INJECTION INTRAMUSCULAR; INTRAVENOUS at 15:36

## 2021-02-08 RX ADMIN — Medication 4 MCG/MIN: at 23:18

## 2021-02-08 RX ADMIN — FAMOTIDINE 20 MG: 10 INJECTION INTRAVENOUS at 09:10

## 2021-02-08 RX ADMIN — ONDANSETRON 4 MG: 2 INJECTION INTRAMUSCULAR; INTRAVENOUS at 05:05

## 2021-02-08 ASSESSMENT — PULMONARY FUNCTION TESTS
PIF_VALUE: 1
PIF_VALUE: 12
PIF_VALUE: 12
PIF_VALUE: 13
PIF_VALUE: 24
PIF_VALUE: 0
PIF_VALUE: 16
PIF_VALUE: 13
PIF_VALUE: 25
PIF_VALUE: 13
PIF_VALUE: 12
PIF_VALUE: 23
PIF_VALUE: 1
PIF_VALUE: 12
PIF_VALUE: 13
PIF_VALUE: 15
PIF_VALUE: 1
PIF_VALUE: 24
PIF_VALUE: 12
PIF_VALUE: 15
PIF_VALUE: 0
PIF_VALUE: 12
PIF_VALUE: 0
PIF_VALUE: 15
PIF_VALUE: 12
PIF_VALUE: 0
PIF_VALUE: 12
PIF_VALUE: 13
PIF_VALUE: 15
PIF_VALUE: 24
PIF_VALUE: 13
PIF_VALUE: 24
PIF_VALUE: 25
PIF_VALUE: 1
PIF_VALUE: 14
PIF_VALUE: 15
PIF_VALUE: 15
PIF_VALUE: 13
PIF_VALUE: 24
PIF_VALUE: 14
PIF_VALUE: 14
PIF_VALUE: 1
PIF_VALUE: 14
PIF_VALUE: 12
PIF_VALUE: 14
PIF_VALUE: 13
PIF_VALUE: 15
PIF_VALUE: 1
PIF_VALUE: 1
PIF_VALUE: 14
PIF_VALUE: 24
PIF_VALUE: 13
PIF_VALUE: 16
PIF_VALUE: 16
PIF_VALUE: 14
PIF_VALUE: 15
PIF_VALUE: 25
PIF_VALUE: 13
PIF_VALUE: 1
PIF_VALUE: 13
PIF_VALUE: 2
PIF_VALUE: 13
PIF_VALUE: 23
PIF_VALUE: 24
PIF_VALUE: 15
PIF_VALUE: 14
PIF_VALUE: 15
PIF_VALUE: 15
PIF_VALUE: 14
PIF_VALUE: 24
PIF_VALUE: 24
PIF_VALUE: 15
PIF_VALUE: 24
PIF_VALUE: 13
PIF_VALUE: 15
PIF_VALUE: 0
PIF_VALUE: 13
PIF_VALUE: 13
PIF_VALUE: 1
PIF_VALUE: 15
PIF_VALUE: 14
PIF_VALUE: 1

## 2021-02-08 ASSESSMENT — ENCOUNTER SYMPTOMS
APNEA: 0
BLOOD IN STOOL: 0
RECTAL PAIN: 0
VOICE CHANGE: 0
ABDOMINAL PAIN: 0
COUGH: 0
STRIDOR: 0
TROUBLE SWALLOWING: 0
DIARRHEA: 0
SORE THROAT: 0
BACK PAIN: 0
ABDOMINAL DISTENTION: 1
SHORTNESS OF BREATH: 0
ANAL BLEEDING: 0
NAUSEA: 0
VOMITING: 0
CHEST TIGHTNESS: 0
CONSTIPATION: 1
EYE DISCHARGE: 0
COLOR CHANGE: 0
WHEEZING: 0
CHOKING: 0

## 2021-02-08 ASSESSMENT — PAIN SCALES - GENERAL
PAINLEVEL_OUTOF10: 0
PAINLEVEL_OUTOF10: 6
PAINLEVEL_OUTOF10: 2
PAINLEVEL_OUTOF10: 0

## 2021-02-08 NOTE — PROGRESS NOTES
Comprehensive Nutrition Assessment    Type and Reason for Visit:  Reassess    Nutrition Recommendations/Plan:   Continue NPO  Continue Current PPN   If NPO anticiapted > 5 additional days , recomemnd PICC placement for TPN. Recommend Central standard 3:1 TPN @ 70 ml/hr with additives/electrolytes per physcian  This will deliver  ~1730 kcals, 84 g protien)    Nutrition Assessment:  Pt remains at high risk for malnutrition, for surgery today related to distal colon osbtrucion, PPN started yesterday. Recommend change to TPN for better meet nutritiona needs    Malnutrition Assessment:  Malnutrition Status: At risk for malnutrition (Comment)    Context:  Acute Illness     Findings of the 6 clinical characteristics of malnutrition:  Energy Intake:  Mild decrease in energy intake (Comment)  Weight Loss:  7 - Greater than 5% over 1 month     Body Fat Loss:  Unable to assess     Muscle Mass Loss:  Unable to assess    Fluid Accumulation:  Unable to assess     Strength:  Not Performed    Estimated Daily Nutrient Needs:  Energy (kcal):  5433-8435 (kg x 22-25); Weight Used for Energy Requirements:  Current(69.8 kg)     Protein (g):  73-79 gm (kg IBW x 1.3-1.4);  Weight Used for Protein Requirements:  Ideal(56.8 kg)        Fluid (ml/day):  ~1700 ml; Method Used for Fluid Requirements:  1 ml/kcal      Nutrition Related Findings:  surgery today for bowel resection, colostomy, Off floor at time of visit, unable to examine, nursing reports abdomen was taunt, distedned, + nausea, NG in palce, 1+ RUE, 1+ LLE edema noted, current weight relfects > 7% loss < 1 month, Has Peripheral access only at [facility] PPN as orderd delivers 50% estimated calorie/ 100% low end of estiamted protein needs      Wounds:  None       Current Nutrition Therapies:    Current Parenteral Nutrition Orders:  · Type and Formula: 2-in-1 Peripheral   · Lipids: None  · Duration: Continuous  · Rate/Volume: 75 ml/hr = 1800 ml · Current PN Order Provides: 918 kcals,, 76.5 g protein  · Goal PN Orders Provides: 918 kcals,, 76.5 g protein      Anthropometric Measures:  · Height: 5' 5\" (165.1 cm)  · Current Body Weight: 150 lb (68 kg)(2/5)   · Admission Body Weight: 160 lb (72.6 kg)(stated)    · Usual Body Weight: 165 lb (74.8 kg)(1/7/21)     · Ideal Body Weight: 125 lbs    · BMI: 25  · BMI Categories: Normal Weight (BMI 18.5-24. 9)       Nutrition Diagnosis:   · Inadequate oral intake related to altered GI function as evidenced by NPO or clear liquid status due to medical condition      Nutrition Interventions:   Food and/or Nutrient Delivery:  Continue NPO, Continue Current Parenteral Nutrition(Continue NPO, If NPO anticiapted > 5 additional days , recomemnd PICC placement for TPN. Recommend standard 3:1 TPN @ 70 ml/hr with additives/electrolytes per physcian, ( ~1730 kcals, 84 g protien))  Nutrition Education/Counseling:  No recommendation at this time   Coordination of Nutrition Care:  Continue to monitor while inpatient    Goals:  PN to deliver kcals/protein within range of estiamed needs       Nutrition Monitoring and Evaluation:     Food/Nutrient Intake Outcomes:  Diet Advancement/Tolerance, Food and Nutrient Intake, Supplement Intake  Physical Signs/Symptoms Outcomes:  Biochemical Data, Fluid Status or Edema, Weight     Discharge Planning:     Too soon to determine     Electronically signed by Radha Morton RD, LD on 2/8/21 at 3:15 PM EST

## 2021-02-08 NOTE — FLOWSHEET NOTE
Pt's abdomen is taut, distended. Pt receiving IV Toradol for abdominal pain. NG in L nares, no output.

## 2021-02-08 NOTE — PROGRESS NOTES
Wound ostomy Continence Nursing    This [address] [facility] Nurse consulted by Dr. Missael Linares for stoma marking, end colostomy. Explained the purpose of stoma marking, patient understands. Abdomen evaluated in a laying position first. Abdomen is firm and distended. In a laying position ructus muscle located (barely palpable due to distension). Abdomen then assessed in a sitting position. Patient states she cannot stand and also cannot tolerate bending forward at this time due to abdominal discomfort. Patient confirms that her abdomen is usually flat without any presence of skin folds. With sitting and laying flat, abdomen remained firm and distended. No skin folds or abdominal defects noted, other than healed scar in midline from prior hysterectomy. Taking into consideration patient's usual belt line, site marked in the LLQ of the abdomen within the rectus muscle. Patient able to see the site marked without difficulty. Patient did not have any other questions at this time.     Electronically signed by ANDREW Dash, RN, Alexi Ramírez on 2/8/2021 at 12:10 PM

## 2021-02-08 NOTE — PROGRESS NOTES
Patient is alert and oriented. She was nauseated once around shift change but nausea has since resolved. Flushed NG tube with 10 mL saline and pulled back 10 mL. Consent for surgery signed in chart. Started new IV and potassium replacement as per prn replacement orders. Messaged Dr. Davis regarding dark urine and low output of 300 mL overnight as well as decreasing kidney function. Pain is at a 2/10 and is currently tolerable. Will continue to monitor. Assessment complete. Patient given bed bath by Leonor Bonds and  is here visiting.  Electronically signed by Berry Mccall RN on 2/8/21 at 9:27 AM EST

## 2021-02-08 NOTE — BRIEF OP NOTE
Department of General Surgery - Adult  Surgical Service colorectal surgery  Brief Operative Report      Pre-operative Diagnosis: Peritoneal carcinomatosis with large bowel obstruction    Post-operative Diagnosis: Same    Procedure: Exploratory laparotomy, subtotal colectomy, diverting ileostomy, abdominal washout, open gastrostomy tube,    Surgeon: Rodney Johns     Assistant(s):  Wilian    Anesthesia:  General endotrachial anesthesia    Estimated blood loss: 200    Total Urine Output: 200     Drains: 19 Turkish round Admon    Specimens: Subtotal Colon with portion of omentum    Implants: None    Findings: Peritoneal carcinomatosis with fixed sigmoid colon most likely related to primary    Complications: None    Condition:  to ICU    See dictated operative report for full details.

## 2021-02-08 NOTE — PROGRESS NOTES
Physical Therapy Missed Treatment   Facility/Department: [facility] MED SURG MLOZ OR Pool/NONE    NAME: Dirk Neves    : 1950 (69 y.o.)  MRN: [id number]    Account: [de-identified]  Gender: female      [x] Patient Unavailable: patient off unit for surgical procedure.      Electronically signed by Kevyn Damon PTA on 21 at 2:05 PM EST

## 2021-02-08 NOTE — OP NOTE
[address]                      [facility], [address] [facility]                                OPERATIVE REPORT    PATIENT NAME: Franck Bailey                     :        1950  MED REC NO:   [id number]                            ROOM:  ACCOUNT NO:   [de-identified]                           ADMIT DATE: 2021  PROVIDER:     Obdulia Faulkner MD    DATE OF PROCEDURE:  2021    PREOPERATIVE DIAGNOSIS:  Peritoneal carcinomatosis with large bowel  obstruction. POSTOPERATIVE DIAGNOSIS:  Peritoneal carcinomatosis with large bowel  obstruction. PROCEDURE:  1. Exploratory laparotomy. 2.  Subtotal colectomy with end ileostomy. 3.  Open gastrostomy tube. SURGEON:  Obdulia Faulkner MD    ASSISTANT:  Ms. Axel Ly. ANESTHESIA:  General endotracheal anesthesia. ESTIMATED BLOOD LOSS:  200 mL. URINE OUTPUT:  200 mL. SPECIMEN:  1. Subtotal colon. 2.  Partial omentum. COMPLICATIONS:  None. INDICATIONS:  A 58-year-old female with a large bowel obstruction with  peritoneal carcinomatosis with malignant ascites. She has persistent  abdominal distention. Based on the imaging as well as discussions, we  discussed the benefits of a palliative diversion as well as gastrostomy  tube for assistance with nutrition and avoidance of nasogastric  intubation. Risks of the procedure given her advance cancer state as well as poor  nutrition were all outlined with the  present. I discussed the  risks and benefits of this procedure. Risks of infection, bleeding,  damage to surrounding intestine, reoperation as well as considerable  chances of death given poor prognosis for abdominal carcinomatosis was  all addressed very thoroughly. Risks and benefits assessed. Consent  obtained for obvious benefits of palliation.     OPERATIVE PROCEDURE:  She was taken to the operating room, placed in the supine position. General endotracheal anesthesia was administered. Her  abdomen was prepped and draped with a ChloraPrep-containing solution. She was marked preoperatively by the stoma nurse. She received  preoperative antibiotics using Cipro and Flagyl. A time-out was taken  for appropriate verification. A midline incision was made. Subcutaneous tissues were incised to the  peritoneum and posterior fascia, which were entered bluntly. At this  time, 2.5 L of ascites was taken off of her abdomen. The incision was  opened up under direct visualization. The omentum was completely replaced with cancer. There were peritoneal  studding throughout the entire abdomen. The transverse and left side of  the colon were obstructed down to the sigmoid level where a fixed tumor  was palpated. The sigmoid colon was not going to be removed given this  tumor has invaded into the retroperitoneum. Using a contour stapler, I transected the distal descending colon and  began mobilizing the splenic flexure. The omentum which was replaced  with cancer along with studding made it difficult to mobilize the  splenic flexure, but I was able to do this. Perforation was made in the  mid transverse colon which was quickly controlled. I was able to come  through the transverse colon with the LigaSure device. The duodenum was identified and I stayed above the duodenum. The colon  had been stuck to the gallbladder as well which was able to be removed  bluntly. The liver was palpated and did not look markedly abnormal.    I mobilized the right colon and transected the ileocolic vessels using  the LigaSure device. I doubly ligated then with 0 Vicryl suture as  well. The right ureter was identified and kept away from the surgical  field. Once I freed up the cecum, I was able to remove the 75% of the  abdominal colon short of the sigmoid colon which was fixed. The contour was used to transect the distal ileum. The specimen was  removed and sent to Pathology in formalin for permanent section. A  wedge of omentum replaced, cancer was removed using the LigaSure device  as well. In area just opposite the site for the colostomy was opened on the right  side of the abdomen using cautery down to the fascia and opened in a  cruciate fashion. The distal small bowel was brought out this ostomy  site and clamped. Two concentric pursestring devices using 2-0 silk sutures were used on  the anterior stomach wall. A hole was made in the serosa and mucosa and  a gastrostomy tube, which was brought through the abdominal wall on the  left upper quadrant was placed in the stomach. The balloon was  inflated. The pursestring sutures were tied. Four interrupted 3-0 silk  sutures were used to fasten the anterior abdominal wall to the anterior  stomach. The phalange was tightened to bring the balloon in  approximation with the stomach wall. At this time, irrigation was performed with Irrisept as well as liters  of saline to ensure complete cleansing of the abdomen. The small bowel was run from the duodenum to the ileostomy and placed  back in normal anatomic position. Excellent hemostasis was assured and  achieved in all four quadrants. A 19-Indonesian round Rachelle Rota drain was placed in the pelvis and brought out  to the left abdomen and sutured to the skin. The lap, needle, instrument, and towel counts were correct. The fascia  was closed with a combination of 0 Vicryl and 0 Prolene suture. Subcutaneous tissues were irrigated. The skin was closed with staples  between iodoform francis. The ileostomy was matured in a Lena fashion using 3-0 chromic suture. An ostomy appliance was placed. The YULISA was placed to bulb suction and  the gastrostomy tube was placed to gravity. Dressings were applied over  the midline and the tape was used to affix. After completion, the lap, needle, instrument, towel counts were again  all correct. The patient was taken to recovery room for eventual transfer to the  Intensive Care Unit.         Vitaliy Vazquez MD    D: 02/08/2021 16:19:34       T: 02/08/2021 16:24:00     [email or web address]  Job#: [id number]     Doc#: [id number]    CC:

## 2021-02-08 NOTE — ANESTHESIA POSTPROCEDURE EVALUATION
Department of Anesthesiology  Postprocedure Note    Patient: Khadar Ferguson  MRN: [id number]  YOB: 1950  Date of evaluation: 2/8/2021  Time:  4:20 PM     Procedure Summary     Date: 02/08/21 Room / Location: [address]    Anesthesia Start: 4365 Anesthesia Stop: 0904    Procedure: EXPLORATORY LAPAROTOMY, SUBTOTAL COLECTOMY,  END ILLEOSTOMY, INSERTION GASTROSTOMY TUBE. (N/A Abdomen) Diagnosis: (INTRACTABLE VOMITING WITH NAUSEA)    Surgeons: Kulwant Wynne MD Responsible Provider: ALBERTINA Woods CRNA    Anesthesia Type: general ASA Status: 3          Anesthesia Type: general    Stevie Phase I: Stevie Score: 10    Stevie Phase II: Stevie Score: 8    Last vitals: Reviewed and per EMR flowsheets.        Anesthesia Post Evaluation    Patient location during evaluation: PACU  Patient participation: complete - patient participated  Level of consciousness: awake  Pain score: 0  Airway patency: patent  Nausea & Vomiting: no nausea and no vomiting  Complications: no  Cardiovascular status: hemodynamically stable  Respiratory status: acceptable  Hydration status: euvolemic

## 2021-02-08 NOTE — PLAN OF CARE
Nutrition Problem #1: Inadequate oral intake  Intervention: Food and/or Nutrient Delivery: Continue NPO, Continue Current Parenteral Nutrition(Continue NPO, If NPO anticiapted > 5 additional days , recomemnd PICC placement for TPN.  Recommend standard 3:1 TPN @ 70 ml/hr with additives/electrolytes per physcian, ( ~1730 kcals, 84 g protien))  Nutritional Goals: PN to deliver kcals/protein within range of estiamed needs

## 2021-02-08 NOTE — ANESTHESIA PRE PROCEDURE
Department of Anesthesiology  Preprocedure Note       Name:  Vinay Recinos   Age:  69 y.o.  :  1950                                          MRN:  [id number]         Date:  2021      Surgeon: Mary Jamil):  MD Michele Willis MD    Procedure: Procedure(s):  EX LAP END COLOSTOMY ROOM 492  GASTROSTOMY TUBE ROOM 492    Medications prior to admission:   Prior to Admission medications    Medication Sig Start Date End Date Taking?  Authorizing Provider   ondansetron (ZOFRAN) 4 MG tablet Take 1 tablet by mouth every 12 hours as needed for Nausea or Vomiting 21  Yes Angelica Montes MD   calcium citrate-vitamin D (CITRICAL + D) 315-250 MG-UNIT TABS per tablet Take 2 tablets by mouth daily   Yes Historical Provider, MD   Ascorbic Acid (VITAMIN C) 250 MG tablet Take 500 mg by mouth daily   Yes Historical Provider, MD       Current medications:    Current Facility-Administered Medications   Medication Dose Route Frequency Provider Last Rate Last Admin    ciprofloxacin (CIPRO) IVPB 400 mg  400 mg Intravenous Once Michele Ochoa MD        metronidazole (FLAGYL) 500 mg in NaCl 100 mL IVPB premix  500 mg Intravenous Once Michele Ochoa MD        0.9 % sodium chloride infusion   Intravenous Continuous Mervin Bartlett  mL/hr at 21 1103 1,000 mL at 21 1103    meperidine (DEMEROL) injection 12.5 mg  12.5 mg Intravenous Q5 Min PRN Mervin Bartlett MD        fentaNYL (SUBLIMAZE) injection 50 mcg  50 mcg Intravenous Q10 Min PRN Mervin Bartlett MD        HYDROmorphone (DILAUDID) injection 0.5 mg  0.5 mg Intravenous Q10 Min PRN Mervin Bartlett MD        HYDROcodone-acetaminophen [facility]) 5-325 MG per tablet 1 tablet  1 tablet Oral PRN Mervin Bartlett MD        Or    HYDROcodone-acetaminophen [facility]) 5-325 MG per tablet 2 tablet  2 tablet Oral PRN Mervin Bartlett MD        ondansetron [facility]) injection 4 mg  4 mg Intravenous Once PRN Juan Jose Reveles MD        metoclopramide [facility]) injection 10 mg  10 mg Intravenous Once PRN Juan Jose Reveles MD        diphenhydrAMINE (BENADRYL) injection 12.5 mg  12.5 mg Intravenous Once PRN Juan Jose Reveles MD        glucose (GLUTOSE) 40 % oral gel 15 g  15 g Oral PRN Krystle Alvarenga MD        dextrose 50 % IV solution  12.5 g Intravenous PRN Krystle Alvarenga MD        glucagon (rDNA) injection 1 mg  1 mg Intramuscular PRN Krystle Alvarenga MD        dextrose 5 % solution  100 mL/hr Intravenous PRN Krystle Alvarenga MD        insulin glargine (LANTUS) injection vial 10 Units  10 Units Subcutaneous Nightly Krystle Alvarenga MD        insulin lispro (HUMALOG) injection vial 0-6 Units  0-6 Units Subcutaneous Q6H Krystle Alvarenga MD   1 Units at 02/07/21 1834    potassium chloride 10 mEq/100 mL IVPB (Peripheral Line)  10 mEq Intravenous PRN Krystle Alvarenga MD 80 mL/hr at 02/08/21 1009 10 mEq at 02/08/21 1009    magnesium sulfate 1000 mg in dextrose 5% 100 mL IVPB  1,000 mg Intravenous PRN Krystle Alvarenga MD        sodium phosphate 10.89 mmol in dextrose 5 % 250 mL IVPB  0.16 mmol/kg Intravenous PRN Krystle Alvarenga MD        Or    sodium phosphate 21.75 mmol in dextrose 5 % 250 mL IVPB  0.32 mmol/kg Intravenous PRN Krystle Alvarenga MD        benzocaine (HURRICAINE) 20 % oral spray   Mouth/Throat 4x Daily PRN Krystle Alvarenga MD   Given at 02/07/21 1205    PN-Adult Premix 4.25/10 - Peripheral Line   Intravenous Continuous TPN Krystle Alvarenga MD 75 mL/hr at 02/07/21 2012 New Bag at 02/07/21 2012    ketorolac (TORADOL) injection 15 mg  15 mg Intravenous Q6H PRN Krystle Alvarenga MD   15 mg at 02/08/21 1105    famotidine (PEPCID) injection 20 mg  20 mg Intravenous BID Krystle Alvarenga MD   20 mg at 02/08/21 0910    pantoprazole (PROTONIX) tablet 40 mg  40 mg Oral QAM AC Amanda Menjivar MD        sodium chloride flush 0.9 % injection 10 mL  10 mL Intravenous 2 times per day Amanda Menjivar, LABGLOM 26.8 02/08/2021    GLUCOSE 233 02/08/2021    PROT 5.9 02/02/2021    CALCIUM 8.3 02/08/2021    BILITOT 0.6 02/02/2021    ALKPHOS 139 02/02/2021    AST 32 02/02/2021    ALT 38 02/02/2021       POC Tests:   Recent Labs     02/08/21  0505   POCGLU 165*       Coags:   Lab Results   Component Value Date    PROTIME 13.3 02/01/2021    INR 1.0 02/01/2021       HCG (If Applicable): No results found for: PREGTESTUR, PREGSERUM, HCG, HCGQUANT     ABGs: No results found for: PHART, PO2ART, TMU1PLK, VWJ6NKC, BEART, H6MRHKJY     Type & Screen (If Applicable):  No results found for: LABABO, LABRH    Drug/Infectious Status (If Applicable):  No results found for: HIV, HEPCAB    COVID-19 Screening (If Applicable):   Lab Results   Component Value Date    COVID19 Not Detected 02/01/2021         Anesthesia Evaluation  Patient summary reviewed and Nursing notes reviewed no history of anesthetic complications:   Airway: Mallampati: II  TM distance: >3 FB   Neck ROM: full  Mouth opening: > = 3 FB Dental: normal exam         Pulmonary:normal exam                               Cardiovascular:Negative CV ROS                      Neuro/Psych:   Negative Neuro/Psych ROS              GI/Hepatic/Renal: Neg GI/Hepatic/Renal ROS            Endo/Other: Negative Endo/Other ROS                    Abdominal:           Vascular: negative vascular ROS. Anesthesia Plan      general     ASA 3       Induction: intravenous. MIPS: Prophylactic antiemetics administered. Anesthetic plan and risks discussed with patient.         Attending anesthesiologist reviewed and agrees with Pre Eval content              Obdulia Menjivar MD   2/8/2021

## 2021-02-08 NOTE — PROGRESS NOTES
Hematology/Oncology  Attending Progress Note        CHIEF COMPLAINT/HPI:  Pt was found to have bowel obstruction at the level of distal  colon. Less abd pain but increased distension; NGT in place. Images of the findings at time of attempted colonoscopy appear to show extrinsic compression at the level of the distal colon  probably from peritoneal or omental mets. REVIEW OF SYSTEMS:    Unremarkable except for symptoms mentioned in HPI.     Current Inpatient Medications:    Current Facility-Administered Medications: glucose (GLUTOSE) 40 % oral gel 15 g, 15 g, Oral, PRN  dextrose 50 % IV solution, 12.5 g, Intravenous, PRN  glucagon (rDNA) injection 1 mg, 1 mg, Intramuscular, PRN  dextrose 5 % solution, 100 mL/hr, Intravenous, PRN  insulin glargine (LANTUS) injection vial 10 Units, 10 Units, Subcutaneous, Nightly  insulin lispro (HUMALOG) injection vial 0-6 Units, 0-6 Units, Subcutaneous, Q6H  potassium chloride 10 mEq/100 mL IVPB (Peripheral Line), 10 mEq, Intravenous, PRN  magnesium sulfate 1000 mg in dextrose 5% 100 mL IVPB, 1,000 mg, Intravenous, PRN  sodium phosphate 10.89 mmol in dextrose 5 % 250 mL IVPB, 0.16 mmol/kg, Intravenous, PRN **OR** sodium phosphate 21.75 mmol in dextrose 5 % 250 mL IVPB, 0.32 mmol/kg, Intravenous, PRN  benzocaine (HURRICAINE) 20 % oral spray, , Mouth/Throat, 4x Daily PRN  PN-Adult Premix 4.25/10 - Peripheral Line, , Intravenous, Continuous TPN  ketorolac (TORADOL) injection 15 mg, 15 mg, Intravenous, Q6H PRN  famotidine (PEPCID) injection 20 mg, 20 mg, Intravenous, BID  pantoprazole (PROTONIX) tablet 40 mg, 40 mg, Oral, QAM AC  sodium chloride flush 0.9 % injection 10 mL, 10 mL, Intravenous, 2 times per day  sodium chloride flush 0.9 % injection 10 mL, 10 mL, Intravenous, PRN  sodium chloride flush 0.9 % injection 10 mL, 10 mL, Intravenous, 2 times per day  sodium chloride flush 0.9 % injection 10 mL, 10 mL, Intravenous, PRN spironolactone (ALDACTONE) tablet 50 mg, 50 mg, Oral, Daily  furosemide (LASIX) tablet 20 mg, 20 mg, Oral, Daily  sodium chloride flush 0.9 % injection 10 mL, 10 mL, Intravenous, Once  sodium chloride flush 0.9 % injection 10 mL, 10 mL, Intravenous, 2 times per day  sodium chloride flush 0.9 % injection 10 mL, 10 mL, Intravenous, PRN  enoxaparin (LOVENOX) injection 40 mg, 40 mg, Subcutaneous, Daily  promethazine (PHENERGAN) tablet 12.5 mg, 12.5 mg, Oral, Q6H PRN **OR** ondansetron (ZOFRAN) injection 4 mg, 4 mg, Intravenous, Q6H PRN  acetaminophen (TYLENOL) tablet 650 mg, 650 mg, Oral, Q6H PRN **OR** acetaminophen (TYLENOL) suppository 650 mg, 650 mg, Rectal, Q6H PRN  senna (SENOKOT) tablet 17.2 mg, 2 tablet, Oral, Nightly  docusate sodium (COLACE) capsule 100 mg, 100 mg, Oral, BID  bisacodyl (DULCOLAX) suppository 10 mg, 10 mg, Rectal, Daily    PHYSICAL EXAM:    VITALS:  /77   Pulse 82   Temp 97 °F (36.1 °C) (Oral)   Resp 16   Ht 5' 5\" (1.651 m)   Wt 150 lb (68 kg)   SpO2 97%   BMI 24.96 kg/m²   INTAKE/OUTPUT:      Intake/Output Summary (Last 24 hours) at 2/7/2021 1011  Last data filed at 2/7/2021 0533  Gross per 24 hour   Intake 2259 ml   Output 50 ml   Net 2209 ml     CONSTITUTIONAL:  awake, alert, cooperative, no apparent distress, and appears stated age; NGT in place  ENT:  Normocephalic, without obvious abnormality, atraumatic,; no epistaxis  NECK:  Supple, symmetrical, trachea midline, no adenopathy, thyroid symmetric, not enlarged and no tenderness, skin normal  NODES: no palpable cervical or supraclavicular adenopathy  LUNGS:  No increased work of breathing, good air exchange, clear to auscultation bilaterally, no crackles or wheezing  CARDIOVASCULAR:  Normal HS regular rate and rhythm,  ABDOMEN: :distended, + mild tenderness in left side of abd ;,no masses palpated,no hepatosplenomegaly  MUSCULOSKELETAL: There is no pedal edema; no calf tenderness PTT:  No results found for: APTT, PTT[APTT}  VITAMIN B12: No components found for: B12  FOLATE:  No results found for: FOLATE  IRON:  No results found for: IRON  Iron Saturation:  No components found for: PERCENTFE  TIBC:  No results found for: TIBC  FERRITIN:  No results found for: FERRITIN  Fibrinogen Level:  No components found for: FIB  24 Hour Urine for Protein:  No components found for: Mark Soares, UTV3  PSA: No results found for: PSA          RADIOLOGY RESULTS:  Echocardiogram Complete 2d With Doppler With Color    Result Date: 2/3/2021 DISTENDED DILATED LOOPS OF LARGE BOWEL WITH A TRANSITION POINT IN THE REGION OF THE DESCENDING COLON, SIGMOID. FINDINGS COULD SUGGEST THAT OF POSSIBLE HIGH-GRADE OBSTRUCTION. FURTHER EVALUATION IS WARRANTED. Us Pelvis Complete    Result Date: 2/3/2021  EXAMINATION:  US PELVIS COMPLETE CLINICAL HISTORY: Ascites. COMPARISONS:  NONE AVAILABLE TECHNIQUE:  Transabdominal ultrasound of the pelvis. FINDINGS:  The uterus is surgically absent. Both left and right ovaries are surgically absent. There is free fluid within the pelvis. FREE FLUID WITHIN THE PELVIS. EXAMINATION:  US DUP ABD PEL RETRO SCROT COMPLETE CLINICAL HISTORY: Abnormal CT scan. COMPARISONS:  NONE AVAILABLE TECHNIQUE:  Transabdominal ultrasound of the right upper quadrant with both duplex color ultrasound and spectral Doppler ultrasound with interrogation of the hepatic and portal venous system was performed. FINDINGS:  The visualized portion of the right lobe of the liver shows no focal parenchymal abnormalities. The hypoechoic area seen at the inferior medial aspect of the right lobe of liver on CT is not appreciated. Within the field-of-view there are findings of cholelithiasis. There is ascites present. The hepatic veins and portal veins are patent. Normal direction seen within the portal veins and hepatic vein. IMPRESSION:  1. ASCITES IS PRESENT WITHIN THE FIELD-OF-VIEW. 2. CHOLELITHIASIS. 3. UNREMARKABLE SONOGRAPHIC EXAMINATION OF THE HEPATIC AND PORTAL VEIN.     Ct Abdomen Pelvis W Iv Contrast Additional Contrast? None    Result Date: 2/1/2021 EXAM:  CT ABDOMEN PELVIS W IV CONTRAST History: Abdominal distention. Abdominal pain. Technique: Multiple contiguous axial images were obtained of the abdomen and pelvis from an level of the lung bases through the ischial tuberosities with IV contrast. Multiplanar reformats were obtained. Delayed images were obtained. Comparison: None available Findings: Small right pleural effusion. Bibasilar subsegmental atelectasis. Subtle nodularity of the liver. A 1 cm hypodense lesion within the right lobe of the liver is seen on axial series 2 image 29 and an ill-defined area of hypodensity within the inferior right lobe of the liver measuring approximately 2 cm is seen on axial  series 2 image 38. The gallbladder is physiologically distended and contains multiple small densities within the dependent portion. No gallbladder wall thickening is identified. The stomach, spleen, pancreas, and adrenal glands appear within normal limits. Small hiatal hernia. There is thickening and nodularity of the omentum. There is a large amount of ascites demonstrating simple fluid density. The kidneys enhance uniformly. There is mild right-sided hydronephrosis however no radiopaque or urinary tract calculi identified. No left-sided urinary tract calculi or hydronephrosis. Urinary bladder is suboptimally distended. There is mild perivesicular inflammation. The uterus is surgically absent Abdominal aorta is nonaneurysmal  . No retroperitoneal or abdominal/pelvic lymphadenopathy. No small bowel obstruction. No overt colonic mass or pericolonic inflammation although the large amount of ascites obscures evaluation for inflammation. There is a large amount of stool within the rectosigmoid colon. The appendix is not definitively visualized. No pneumoperitoneum or portal venous gas. No acute or aggressive osseous abnormality. Degenerative changes of the spine. Thickening and nodularity of the omentum concerning for peritoneal metastasis or peritonitis. A large amount of ascites is present. Two ill-defined lesions within the right lobe of the liver measuring 1 cm and 2 cm respectively are nonspecific but most concerning for metastatic lesions. Mild perivesicular inflammation. Correlation with urinalysis recommended to exclude cystitis. Large amount stool within the rectosigmoid colon may represent constipation and/or fecal impaction. Subtle nodularity of the liver suggests cirrhosis. Mild right-sided hydronephrosis without radiopaque calculus. Debris within the gallbladder likely represents gallstones and/or gallbladder sludge. Small right pleural effusion. All CT scans at this facility use dose modulation, iterative reconstruction, and/or weight based dosing when appropriate to reduce radiation dose to as low as reasonably achievable. Us Abdomen Limited    Result Date: 2/3/2021  CLINICAL INDICATION:   Patient with new ascites and abdominal pain, concern for metastatic disease COMPARISON: CT dating February 1, 2021 DISCUSSION:     Transverse and longitudinal images of the right upper quadrant of the abdomen were obtained with color Doppler interrogation of the hepatic vessels. The liver is normal  in echogenicity and no evidence of focal masses seen in the right liver lobe. The hepatic vein, and portal veins are patent. There is normal direction of blood flow in the portal veins towards the liver. The main portal vein, right portal vein, left portal vein, and hepatic vein are patent with normal direction. The common hepatic artery is patent with velocity measuring 72 cm/s. The gallbladder is seen with echogenic gallstones. The gallbladder wall measures 2.4 mm. The portal veins, hepatic vein, and common hepatic artery are patent with normal direction of blood flow. No lesions are seen in the right liver lobe, though ultrasound is limited, and if there is clinical suspicion, MRI of the liver can be obtained. Us Dup Abd Pel Retro Scrot Complete    Result Date: 2/3/2021  EXAMINATION:  US PELVIS COMPLETE CLINICAL HISTORY: Ascites. COMPARISONS:  NONE AVAILABLE TECHNIQUE:  Transabdominal ultrasound of the pelvis. FINDINGS:  The uterus is surgically absent. Both left and right ovaries are surgically absent. There is free fluid within the pelvis. FREE FLUID WITHIN THE PELVIS. EXAMINATION:  US DUP ABD PEL RETRO SCROT COMPLETE CLINICAL HISTORY: Abnormal CT scan. COMPARISONS:  NONE AVAILABLE TECHNIQUE:  Transabdominal ultrasound of the right upper quadrant with both duplex color ultrasound and spectral Doppler ultrasound with interrogation of the hepatic and portal venous system was performed. FINDINGS:  The visualized portion of the right lobe of the liver shows no focal parenchymal abnormalities. The hypoechoic area seen at the inferior medial aspect of the right lobe of liver on CT is not appreciated. Within the field-of-view there are findings of cholelithiasis. There is ascites present. The hepatic veins and portal veins are patent. Normal direction seen within the portal veins and hepatic vein. IMPRESSION:  1. ASCITES IS PRESENT WITHIN THE FIELD-OF-VIEW. 2. CHOLELITHIASIS. 3. UNREMARKABLE SONOGRAPHIC EXAMINATION OF THE HEPATIC AND PORTAL VEIN. Xr Chest Abdomen Ng Placement    Result Date: 2/4/2021  EXAMINATION: LOW CHEST HIGH KUB CLINICAL HISTORY: NG tube placement COMPARISON : The right fourth 2021 FINDINGS: Interval placement of nasogastric tube. The tip is within the stomach. Within the field-of-view there is still prominent dilated loops of large bowel. .     INTERVAL PLACEMENT OF NASOGASTRIC TUBE AS DESCRIBED ABOVE    Us Guided Paracentesis 1.  Status post technically successful ultrasound-guided paracentesis. Susy Blas is a Female of 69 years age, referred for Ultrasound Guided Paracentesis. PROCEDURE: Survey of the abdomen showed large amount of ascites fluid. After obtaining informed consent, the patient was positioned supine on the sonography table. Using ultrasound, the skin over the left hemiabdomen was locally anesthetized with 1% lidocaine. Following that, a Yueh needle was advanced into the fluid pocket using ultrasound visualization. 4440cc, of clear yellow fluid were aspirated and sent for cytology, and pathology. The needle was removed, and hemostasis was obtained with pressure. A Band-Aid was placed. Post procedure images did not demonstrate hemorrhage at the target site. The patient tolerated the procedure well. The patient left the department in good condition. A radiology nurse was in presence monitoring vital signs, assisting throughout the procedure.      Fl Small Bowel Follow Through Only    Result Date: 2/7/2021 Small bowel follow-through. HISTORY: Postcolonoscopy. COMPARISON: CT abdomen pelvis, February 1, 2021. FINDINGS:  image shows a nasogastric tube in stomach. Marked diffuse dilatation of colon cecum sigmoid colon. 0 minute film shows administration of oral contrast medium via nasogastric tube with contrast medium filling in stomach. 30 minute image shows contrast medium within attenuated distal stomach. Contrast medium visualized filling the duodenum with loop of small bowel coursing into right lower quadrant. Contrast medium also fills normal caliber jejunal loops and epigastric area residual contrast medium from attempted barium enema identified sigmoid colon. 60 minute image shows contrast medium pooling within jejunal loops in the right lower quadrant, and contrast medium to a lesser degree filling jejunal loops found in the left perigastric region. 90 minute image shows mild progression of contrast medium into proximal and mid jejunal loops. Several loops shows little progression of contrast medium passed level of mid jejunum. 4 hour image shows contrast medium with bowel loops in left lower quadrant. However, contrast media now visualized overlying region of the ileocecal valve and portion of ascending colon. 6 hour image shows additional contrast medium entering ascending colon and now entering proximal transverse colon, splenic flexure, and proximal descending colon. There is marked evaluate attenuation of contrast medium within the small bowel. At 8.5 hours, there is contrast medium continues to empty from the small bowel, and now is layering, in the dilated ascending colon. Dilatation of the cecum, measuring approximately 13.7 cm is identified. Residual contrast medium still fills the rectum. Final imaging of 21.5 hours shows near complete evacuation of contrast medium from the small bowel, January dilatation seen to the transverse diameter 17.2 cm, and contrast medium filling the ascending colon.

## 2021-02-09 LAB
ANION GAP SERPL CALCULATED.3IONS-SCNC: 12 MEQ/L (ref 9–15)
BASOPHILS ABSOLUTE: 0 K/UL (ref 0–0.2)
BASOPHILS RELATIVE PERCENT: 0.1 %
BUN BLDV-MCNC: 59 MG/DL (ref 8–23)
CALCIUM SERPL-MCNC: 7 MG/DL (ref 8.5–9.9)
CHLORIDE BLD-SCNC: 109 MEQ/L (ref 95–107)
CO2: 10 MEQ/L (ref 20–31)
CREAT SERPL-MCNC: 1.58 MG/DL (ref 0.5–0.9)
EOSINOPHILS ABSOLUTE: 0 K/UL (ref 0–0.7)
EOSINOPHILS RELATIVE PERCENT: 0.1 %
GFR AFRICAN AMERICAN: 38.9
GFR NON-AFRICAN AMERICAN: 32.2
GLUCOSE BLD-MCNC: 106 MG/DL (ref 70–99)
GLUCOSE BLD-MCNC: 78 MG/DL (ref 60–115)
GLUCOSE BLD-MCNC: 83 MG/DL (ref 60–115)
GLUCOSE BLD-MCNC: 88 MG/DL (ref 60–115)
HCT VFR BLD CALC: 43.9 % (ref 37–47)
HEMOGLOBIN: 13.6 G/DL (ref 12–16)
LYMPHOCYTES ABSOLUTE: 2.1 K/UL (ref 1–4.8)
LYMPHOCYTES RELATIVE PERCENT: 20 %
MCH RBC QN AUTO: 28.9 PG (ref 27–31.3)
MCHC RBC AUTO-ENTMCNC: 31 % (ref 33–37)
MCV RBC AUTO: 93.1 FL (ref 82–100)
MONOCYTES ABSOLUTE: 0.4 K/UL (ref 0.2–0.8)
MONOCYTES RELATIVE PERCENT: 4.1 %
NEUTROPHILS ABSOLUTE: 7.9 K/UL (ref 1.4–6.5)
NEUTROPHILS RELATIVE PERCENT: 75.7 %
PDW BLD-RTO: 14.6 % (ref 11.5–14.5)
PERFORMED ON: NORMAL
PLATELET # BLD: 230 K/UL (ref 130–400)
POTASSIUM REFLEX MAGNESIUM: 4 MEQ/L (ref 3.4–4.9)
RBC # BLD: 4.72 M/UL (ref 4.2–5.4)
SODIUM BLD-SCNC: 131 MEQ/L (ref 135–144)
WBC # BLD: 10.4 K/UL (ref 4.8–10.8)

## 2021-02-09 PROCEDURE — 80048 BASIC METABOLIC PNL TOTAL CA: CPT

## 2021-02-09 PROCEDURE — 36415 COLL VENOUS BLD VENIPUNCTURE: CPT

## 2021-02-09 PROCEDURE — 99213 OFFICE O/P EST LOW 20 MIN: CPT

## 2021-02-09 PROCEDURE — 99231 SBSQ HOSP IP/OBS SF/LOW 25: CPT | Performed by: NURSE PRACTITIONER

## 2021-02-09 PROCEDURE — 6360000002 HC RX W HCPCS: Performed by: INTERNAL MEDICINE

## 2021-02-09 PROCEDURE — 99291 CRITICAL CARE FIRST HOUR: CPT | Performed by: INTERNAL MEDICINE

## 2021-02-09 PROCEDURE — 2580000003 HC RX 258: Performed by: COLON & RECTAL SURGERY

## 2021-02-09 PROCEDURE — 2580000003 HC RX 258: Performed by: INTERNAL MEDICINE

## 2021-02-09 PROCEDURE — 6360000002 HC RX W HCPCS: Performed by: COLON & RECTAL SURGERY

## 2021-02-09 PROCEDURE — P9047 ALBUMIN (HUMAN), 25%, 50ML: HCPCS | Performed by: INTERNAL MEDICINE

## 2021-02-09 PROCEDURE — 99024 POSTOP FOLLOW-UP VISIT: CPT | Performed by: COLON & RECTAL SURGERY

## 2021-02-09 PROCEDURE — 2580000003 HC RX 258

## 2021-02-09 PROCEDURE — 94761 N-INVAS EAR/PLS OXIMETRY MLT: CPT

## 2021-02-09 PROCEDURE — 2700000000 HC OXYGEN THERAPY PER DAY

## 2021-02-09 PROCEDURE — 87040 BLOOD CULTURE FOR BACTERIA: CPT

## 2021-02-09 PROCEDURE — 87086 URINE CULTURE/COLONY COUNT: CPT

## 2021-02-09 PROCEDURE — 2000000000 HC ICU R&B

## 2021-02-09 PROCEDURE — 85025 COMPLETE CBC W/AUTO DIFF WBC: CPT

## 2021-02-09 RX ORDER — ALBUMIN (HUMAN) 12.5 G/50ML
50 SOLUTION INTRAVENOUS ONCE
Status: COMPLETED | OUTPATIENT
Start: 2021-02-09 | End: 2021-02-09

## 2021-02-09 RX ORDER — SODIUM CHLORIDE 9 MG/ML
250 INJECTION, SOLUTION INTRAVENOUS ONCE
Status: DISCONTINUED | OUTPATIENT
Start: 2021-02-09 | End: 2021-02-10

## 2021-02-09 RX ORDER — LIDOCAINE HYDROCHLORIDE 20 MG/ML
5 INJECTION, SOLUTION INFILTRATION; PERINEURAL ONCE
Status: DISCONTINUED | OUTPATIENT
Start: 2021-02-09 | End: 2021-02-10

## 2021-02-09 RX ORDER — SODIUM CHLORIDE, SODIUM LACTATE, POTASSIUM CHLORIDE, AND CALCIUM CHLORIDE .6; .31; .03; .02 G/100ML; G/100ML; G/100ML; G/100ML
500 INJECTION, SOLUTION INTRAVENOUS ONCE
Status: COMPLETED | OUTPATIENT
Start: 2021-02-09 | End: 2021-02-09

## 2021-02-09 RX ORDER — SODIUM CHLORIDE 0.9 % (FLUSH) 0.9 %
10 SYRINGE (ML) INJECTION EVERY 12 HOURS SCHEDULED
Status: DISCONTINUED | OUTPATIENT
Start: 2021-02-09 | End: 2021-02-18 | Stop reason: HOSPADM

## 2021-02-09 RX ORDER — SODIUM CHLORIDE 0.9 % (FLUSH) 0.9 %
10 SYRINGE (ML) INJECTION PRN
Status: DISCONTINUED | OUTPATIENT
Start: 2021-02-09 | End: 2021-02-18 | Stop reason: HOSPADM

## 2021-02-09 RX ORDER — SODIUM CHLORIDE, SODIUM LACTATE, POTASSIUM CHLORIDE, CALCIUM CHLORIDE 600; 310; 30; 20 MG/100ML; MG/100ML; MG/100ML; MG/100ML
INJECTION, SOLUTION INTRAVENOUS CONTINUOUS
Status: DISCONTINUED | OUTPATIENT
Start: 2021-02-09 | End: 2021-02-11

## 2021-02-09 RX ORDER — SODIUM CHLORIDE, SODIUM LACTATE, POTASSIUM CHLORIDE, CALCIUM CHLORIDE 600; 310; 30; 20 MG/100ML; MG/100ML; MG/100ML; MG/100ML
INJECTION, SOLUTION INTRAVENOUS
Status: COMPLETED
Start: 2021-02-09 | End: 2021-02-09

## 2021-02-09 RX ADMIN — PIPERACILLIN AND TAZOBACTAM 3375 MG: 3; .375 INJECTION, POWDER, LYOPHILIZED, FOR SOLUTION INTRAVENOUS at 19:21

## 2021-02-09 RX ADMIN — ALBUMIN (HUMAN) 50 G: 0.25 INJECTION, SOLUTION INTRAVENOUS at 12:08

## 2021-02-09 RX ADMIN — SODIUM CHLORIDE, POTASSIUM CHLORIDE, SODIUM LACTATE AND CALCIUM CHLORIDE 500 ML: 600; 310; 30; 20 INJECTION, SOLUTION INTRAVENOUS at 11:33

## 2021-02-09 RX ADMIN — Medication 10 ML: at 19:29

## 2021-02-09 RX ADMIN — SODIUM CHLORIDE, SODIUM LACTATE, POTASSIUM CHLORIDE, AND CALCIUM CHLORIDE 500 ML: .6; .31; .03; .02 INJECTION, SOLUTION INTRAVENOUS at 11:33

## 2021-02-09 RX ADMIN — SODIUM CHLORIDE, POTASSIUM CHLORIDE, SODIUM LACTATE AND CALCIUM CHLORIDE: 600; 310; 30; 20 INJECTION, SOLUTION INTRAVENOUS at 17:08

## 2021-02-09 RX ADMIN — Medication 10 ML: at 12:15

## 2021-02-09 RX ADMIN — SODIUM CHLORIDE, POTASSIUM CHLORIDE, SODIUM LACTATE AND CALCIUM CHLORIDE: 600; 310; 30; 20 INJECTION, SOLUTION INTRAVENOUS at 13:33

## 2021-02-09 RX ADMIN — SODIUM CHLORIDE: 9 INJECTION, SOLUTION INTRAVENOUS at 01:43

## 2021-02-09 RX ADMIN — ENOXAPARIN SODIUM 40 MG: 40 INJECTION SUBCUTANEOUS at 19:21

## 2021-02-09 ASSESSMENT — ENCOUNTER SYMPTOMS
NAUSEA: 0
WHEEZING: 0
VOICE CHANGE: 0
SORE THROAT: 0
APNEA: 0
BLOOD IN STOOL: 0
STRIDOR: 0
ABDOMINAL DISTENTION: 1
TROUBLE SWALLOWING: 0
CHEST TIGHTNESS: 0
CONSTIPATION: 1
EYE DISCHARGE: 0
CHOKING: 0
SHORTNESS OF BREATH: 0
BACK PAIN: 0
ABDOMINAL PAIN: 0
ANAL BLEEDING: 0
VOMITING: 0
COUGH: 0
COLOR CHANGE: 0
DIARRHEA: 0
RECTAL PAIN: 0

## 2021-02-09 ASSESSMENT — PAIN DESCRIPTION - LOCATION: LOCATION: ABDOMEN

## 2021-02-09 ASSESSMENT — PAIN SCALES - GENERAL
PAINLEVEL_OUTOF10: 0
PAINLEVEL_OUTOF10: 3
PAINLEVEL_OUTOF10: 0

## 2021-02-09 NOTE — PROGRESS NOTES
PHARMACY NOTE:   ICU Rounds not Attended: patient work-up 2/9:    Pt diagnosis: malignant ascities    IV Fluids: Ns @ 125   Renal:   Recent Labs     02/07/21  0706 02/08/21  0616 02/09/21  0528   CREATININE 1.27* 1.85* 1.58*    Estimated Creatinine Clearance: 29 mL/min (A) (based on SCr of 1.58 mg/dL (H)). Antimicrobial Therapy:   none   Cultures 2/2    Recent Labs     02/07/21  0706 02/08/21  0616 02/09/21  0528   WBC 13.5* 15.1* 10.4         Pressors:   norepinephrine     Drips: PCA dilaudid    Insulin Therapy (goal: 140-180): low SSI   none units given past 24 hours   Lantus 10 units recommend holding d/t pt recent low blood glucose 80-100s   Recent Labs     02/07/21  0706 02/08/21  0616 02/09/21  0528   GLUCOSE 230* 233* 106*       Stress Ulcer Prophylaxis:   Pantoprazole 40 PO   Famotidine IV BID  On at home: none  Recommend continuing with 1 agent and d/c of other    DVT Prophylaxis/Anticoagulant Therapy: 40mg  Recent Labs     02/07/21  0706 02/08/21  0616 02/09/21  0528    297 230     No results for input(s): INR in the last 72 hours. Bowel Regimen:   Colace BID  Senna QHS  Bisacodyl 10 once daily      Follow up/Changes:   Recommendations given to Dr. Lacy Engle on patient. Will follow up on changes as needed. Recommended holding spironolactone as pt on Levophed drip.       Luci Rico, PharmD   2/9/2021 10:25 AM

## 2021-02-09 NOTE — PROGRESS NOTES
Patient up in bed alert and oriented x 4. PEG draining to gravity ; draining dark green bile. Ileostomy site is clean and dry, no output in ileostomy. Aguilar draining yellow clear urine. Midline abdominal incision covered, Dr. Bairon Santana removed the initial dressing and was recovered with an ABD.     [phone] consent for PICC line signed by . PATIENT wants to do suppository after picc line   1310 continues to be weaned off levophed. Fluid bolus finished. Resting with eyes closed.

## 2021-02-09 NOTE — PROGRESS NOTES
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 Pt   noted to have Levophed gtt in ICU with low B/P and low MAPS postoperatively . If possible, please document in the progress notes and discharge summary if   you are evaluating and/or treating any of the following: The medical record reflects the following:  Risk Factors: perineal carcinomatosis with bowel obstruction, subtotal   colectomy with ileostomy and PEG tube placement  Clinical Indicators: Post-op BP; 75//52, MAPS 52-63; Anesthesia Report:   Hydration status: euvolemic  Treatment: Transfer to ICU, IVFB 1L, IVF 150ml/hr x 1L then 125ml/hr x 1L, IV   Levophed, labs and monitoring    If you have any questions, please feel free to contact me at [phone]. Thank you,  Gabriel Ruiz RN BSN CDS  Options provided:  -- Postoperative hypovolemic shock  -- Hypovolemic Shock  -- Hypovolemia without Shock  -- Hypotension without Shock  -- Other - I will add my own diagnosis  -- Disagree - Not applicable / Not valid  -- Disagree - Clinically unable to determine / Unknown  -- Refer to Clinical Documentation Reviewer    PROVIDER RESPONSE TEXT:    This patient has hypovolemia without shock.     Query created by: Yomaira Stokes on 2021 8:37 AM      Electronically signed by:  Suman Abraham MD 2021 12:07 PM

## 2021-02-09 NOTE — PROGRESS NOTES
Palliative Care Progress Note  Patient: Clementina Hernandez  Gender: female  YOB: 1950  Unit/Bed: IC16/IC16-01  Code Status: Full Code  Inpatient Treatment Team: Treatment Team: Attending Provider: Ranjan Wayne MD; Consulting Physician: Divya Gama MD; Consulting Physician: Vipin Stearns MD; Consulting Physician: Rebecca Sherwood MD; Utilization Reviewer: Patrick Tomlin RN; Surgeon: Rebecca Sherwood MD; Wound Care: Ryley Lizarraga RN; : Felix Leiva; Registered Nurse: Kathryn Shannon RN; : CASS Pérez; Utilization Reviewer: Farnaz Becerra RN  Admit Date:  2/1/2021    Chief Complaint: Goals of Care    History of Presenting Illness:      Clementina Hernandez is a 69 y.o. female on hospital day 8 with a history of Anxiety and panic attacks, she had been experiencing abdominal pain, constipation, anorexia, and weight loss for several weeks and was sent to ED by PCP for dehydration. Abdominal CT showed: Thickening and nodularity of the omentum concerning for peritoneal metastasis or peritonitis. A large amount of ascites is present.       Two ill-defined lesions within the right lobe of the liver measuring 1 cm and 2 cm respectively are nonspecific but most concerning for metastatic lesions.       Mild perivesicular inflammation.  Correlation with urinalysis recommended to exclude cystitis.       Large amount stool within the rectosigmoid colon may represent constipation and/or fecal impaction.       Subtle nodularity of the liver suggests cirrhosis.       Mild right-sided hydronephrosis without radiopaque calculus.       Debris within the gallbladder likely represents gallstones and/or gallbladder sludge.       Small right pleural effusion.         Cytology from 2/2/21 showed Atypical mesothelial cells, favor reactive, macrophages, RBCs and WBCs present.  Elevated CA-125 She developed a bowel obstruction at the level of distal colon most likely from mets, NG tube in place, she is not in any pain, no nausea, but abdomen is quite distended and firm. She is having surgery today for a diverting colostomy and placement of G tube for nutrition. She will be evaluated for possible chemotx after recovery depend ending on performance status per oncology. 2/9/21           Recovering in ICU after exploratory laparotomy with subtotal colectomy and ileostomy. Estimated blood loss 200 cc,  hypotensive postop, she is currently on Levophed 18 mics per minute, Negative chest pain, no SOB, no abdominal pain,nausea, vomiting,no bowel movement in the ileostomy bag, urine output 625 cc, drainage output 790 cc, serosanguineous, no fever overnight, she is saturating 100% on 2 L nasal cannula. Demeanor calm and relaxed,  at bedside. Review of Systems:       Review of Systems   Constitutional: Negative for activity change, appetite change, chills, diaphoresis, fatigue, fever and unexpected weight change. HENT: Negative for drooling, hearing loss, mouth sores, sore throat, trouble swallowing and voice change. Eyes: Negative for discharge and visual disturbance. Respiratory: Negative for apnea, cough, choking, chest tightness, shortness of breath, wheezing and stridor. Cardiovascular: Negative for chest pain, palpitations and leg swelling. Gastrointestinal: Positive for abdominal distention and constipation. Negative for abdominal pain, anal bleeding, blood in stool, diarrhea, nausea, rectal pain and vomiting. Genitourinary: Negative for difficulty urinating, dysuria, enuresis, frequency and hematuria. Musculoskeletal: Negative for arthralgias, back pain, gait problem, joint swelling and myalgias. Skin: Positive for pallor. Negative for color change, rash and wound. Allergic/Immunologic: Negative for food allergies and immunocompromised state. Neurological: Negative for dizziness, tremors, seizures, syncope, facial asymmetry, speech difficulty, weakness, light-headedness, numbness and headaches. Hematological: Negative for adenopathy. Does not bruise/bleed easily. Psychiatric/Behavioral: Negative for agitation, behavioral problems, confusion, decreased concentration, dysphoric mood, hallucinations, self-injury, sleep disturbance and suicidal ideas. The patient is not nervous/anxious and is not hyperactive. Physical Examination:       BP 91/65   Pulse 90   Temp 98.6 °F (37 °C) (Oral)   Resp 16   Ht 5' 5\" (1.651 m)   Wt 150 lb (68 kg)   SpO2 100%   BMI 24.96 kg/m²    Physical Exam  Constitutional:       General: She is not in acute distress. Appearance: She is cachectic. She is ill-appearing. She is not diaphoretic. Comments: NG Tube   HENT:      Head: Normocephalic and atraumatic. Right Ear: External ear normal.      Left Ear: External ear normal.      Nose: Nose normal.      Mouth/Throat:      Pharynx: No oropharyngeal exudate. Eyes:      General: No scleral icterus. Right eye: No discharge. Left eye: No discharge. Conjunctiva/sclera: Conjunctivae normal.      Pupils: Pupils are equal, round, and reactive to light. Neck:      Musculoskeletal: Normal range of motion and neck supple. Thyroid: No thyromegaly. Vascular: No JVD. Trachea: No tracheal deviation. Cardiovascular:      Rate and Rhythm: Normal rate and regular rhythm. Heart sounds: Normal heart sounds. Pulmonary:      Effort: Pulmonary effort is normal. No respiratory distress. Breath sounds: Normal breath sounds. No stridor. No wheezing or rales. Chest:      Chest wall: No tenderness. Abdominal:      General: Bowel sounds are absent. There is distension. Palpations: Abdomen is rigid. There is no mass. Tenderness: There is no abdominal tenderness. There is no guarding or rebound. Musculoskeletal: Normal range of motion. General: No tenderness or deformity. Lymphadenopathy:      Cervical: No cervical adenopathy. Skin:     General: Skin is warm and dry. Findings: No erythema or rash. Neurological:      Mental Status: She is alert and oriented to person, place, and time. Psychiatric:         Behavior: Behavior normal.         Thought Content: Thought content normal.         Allergies:       Allergies   Allergen Reactions    Aspirin Other (See Comments)     GI issue       Medications:      Current Facility-Administered Medications   Medication Dose Route Frequency Provider Last Rate Last Admin    lidocaine 2 % injection 5 mL  5 mL Intradermal Once Marilu Weems MD        sodium chloride flush 0.9 % injection 10 mL  10 mL Intravenous 2 times per day Marilu Weems MD        sodium chloride flush 0.9 % injection 10 mL  10 mL Intravenous PRN Marilu Weems MD        0.9 % sodium chloride infusion  250 mL Intravenous Once Marilu Weems MD        0.9 % sodium chloride infusion   Intravenous Continuous Kris Roth  mL/hr at 02/09/21 0143 New Bag at 02/09/21 0143    naloxone (NARCAN) injection 0.4 mg  0.4 mg Intravenous PRN Kris Roth MD        HYDROmorphone (DILAUDID) 0.2 mg/mL PCA   Intravenous Continuous Kris Roth MD   10 mg at 02/08/21 1708    norepinephrine (LEVOPHED) 16 mg in dextrose 5% 250 mL infusion  2-100 mcg/min Intravenous Continuous Danyell Brito MD 11.3 mL/hr at 02/09/21 1053 12 mcg/min at 02/09/21 1053    glucose (GLUTOSE) 40 % oral gel 15 g  15 g Oral PRN Kris Roth MD        dextrose 50 % IV solution  12.5 g Intravenous PRN Kris Roth MD        glucagon (rDNA) injection 1 mg  1 mg Intramuscular PRN Kris Roth MD        dextrose 5 % solution  100 mL/hr Intravenous ANNA Roth MD  acetaminophen (TYLENOL) tablet 650 mg  650 mg Oral Q6H PRN Maverick Landeros MD   650 mg at 02/06/21 0940    Or    acetaminophen (TYLENOL) suppository 650 mg  650 mg Rectal Q6H PRN Maverick Landeros MD        senna (SENOKOT) tablet 17.2 mg  2 tablet Oral Nightly Maverick Landeros MD   17.2 mg at 02/02/21 2138    docusate sodium (COLACE) capsule 100 mg  100 mg Oral BID Maverick Landeros MD   100 mg at 02/04/21 2163    bisacodyl (DULCOLAX) suppository 10 mg  10 mg Rectal Daily Maverick Landeros MD   10 mg at 02/08/21 4116       History: PMHx:  Past Medical History:   Diagnosis Date    Anxiety     panic attacks    Fibroids 1998    has complete hysterctomy    Kidney stone     14 years ago       PSHx:  Past Surgical History:   Procedure Laterality Date    HYSTERECTOMY, TOTAL ABDOMINAL      LAPAROTOMY N/A 2/8/2021    EXPLORATORY LAPAROTOMY, SUBTOTAL COLECTOMY,  END ILLEOSTOMY, INSERTION GASTROSTOMY TUBE.  performed by Maverick Landeros MD at [address] [place] [address] 02/02/2021    4,440 ml removed by Dr. Vidal Chou N/A 2/5/2021    SIGMOIDOSCOPY BIOPSY FLEXIBLE performed by Rich Resendiz MD at [facility] Hx:  Social History     Socioeconomic History    Marital status:      Spouse name: None    Number of children: None    Years of education: None    Highest education level: None   Occupational History    None   Social Needs    Financial resource strain: None    Food insecurity     Worry: Never true     Inability: Never true    Transportation needs     Medical: No     Non-medical: No   Tobacco Use    Smoking status: Never Smoker    Smokeless tobacco: Never Used   Substance and Sexual Activity    Alcohol use: Yes     Comment: occasional- socially    Drug use: Never    Sexual activity: Yes   Lifestyle    Physical activity     Days per week: None     Minutes per session: None    Stress: None   Relationships    Social connections Talks on phone: None     Gets together: None     Attends [employer] service: None     Active member of club or organization: None     Attends meetings of clubs or organizations: None     Relationship status: None    Intimate partner violence     Fear of current or ex partner: None     Emotionally abused: None     Physically abused: None     Forced sexual activity: None   Other Topics Concern    None   Social History Narrative    Retired- worked with  in law firm     [facility] [employer]- Gen[facility]     3 children. [place] in [place] Others in [address] up in [address].         Family Hx:  Family History   Problem Relation Age of Onset    Breast Cancer Mother     Heart Disease Father     Atrial Fibrillation Brother        LABS: Reviewed     CBC:  Lab Results   Component Value Date    WBC 10.4 02/09/2021    RBC 4.72 02/09/2021    HGB 13.6 02/09/2021    HCT 43.9 02/09/2021    MCV 93.1 02/09/2021    MCH 28.9 02/09/2021    MCHC 31.0 02/09/2021    RDW 14.6 02/09/2021     02/09/2021     CBC with Differential:   Lab Results   Component Value Date    WBC 10.4 02/09/2021    RBC 4.72 02/09/2021    HGB 13.6 02/09/2021    HCT 43.9 02/09/2021     02/09/2021    MCV 93.1 02/09/2021    MCH 28.9 02/09/2021    MCHC 31.0 02/09/2021    RDW 14.6 02/09/2021    METASPCT 1 02/01/2021    LYMPHOPCT 20.0 02/09/2021    MONOPCT 4.1 02/09/2021    BASOPCT 0.1 02/09/2021    MONOSABS 0.4 02/09/2021    LYMPHSABS 2.1 02/09/2021    EOSABS 0.0 02/09/2021    BASOSABS 0.0 02/09/2021     CMP:    Lab Results   Component Value Date     02/09/2021    K 4.0 02/09/2021     02/09/2021    CO2 10 02/09/2021    BUN 59 02/09/2021    CREATININE 1.58 02/09/2021    GFRAA 38.9 02/09/2021    LABGLOM 32.2 02/09/2021    GLUCOSE 106 02/09/2021    PROT 5.9 02/02/2021    LABALBU 3.1 02/02/2021    CALCIUM 7.0 02/09/2021    BILITOT 0.6 02/02/2021    ALKPHOS 139 02/02/2021    AST 32 02/02/2021    ALT 38 02/02/2021     BMP:    Lab Results Component Value Date     02/09/2021    K 4.0 02/09/2021     02/09/2021    CO2 10 02/09/2021    BUN 59 02/09/2021    LABALBU 3.1 02/02/2021    CREATININE 1.58 02/09/2021    CALCIUM 7.0 02/09/2021    GFRAA 38.9 02/09/2021    LABGLOM 32.2 02/09/2021    GLUCOSE 106 02/09/2021     TSH: No results found for: TSH  Vitamin B12 and Folate: No components found for: FOLIC,  O19  Urinalysis:   Lab Results   Component Value Date    NITRU Negative 02/01/2021    BLOODU Negative 02/01/2021    SPECGRAV 1.065 02/01/2021    GLUCOSEU Negative 02/01/2021           FUNCTIONAL ADL´S:     Independent: [ x ] Eating, [   ] Dressing, [   ] Transferring, [   ] Donalynn Kemp, [   ] Thersia Joelle, [   ] Continence  Dependent   : [  ] Eating, [   ] Dressing, [   ] Transferring, [   ] Donalynn Kemp, [   ] Bathing, [   ] Continence  W. assistant : [  ] Eating, [   x] Dressing, [  x ] Transferring, [x   ] Toileting, [ x  ] Bathing, [  x ] Continence    Radiology: Reviewed      No results found. Assessment and plan: Adenocarcinoma with malignant ascites; possible abdominal carcinomatosis  - Oncology following   - s/p paracentesis with 4L removed.   Possibly pancreatic vs cholangiocarcinoma based on CA 19-9    High grade obstruction of descending colon  - Colorectal surgery following  - Colectomy and G tube     Anorexia/ Unintentional weight loss  - G tube  placed   - Consider restarting mirtazapine 7.5 mg at HS  - Dietitian following    Nausea  - NG tube in place  - Colectomy today  - Continue Ondansetron 4 mg IV every 4 hours prn nausea    Anxiety  - Consider restarting Mirtazapine 7.5 mg po at HS after surgery  - She feels it is controlled at this time      Advance Care Planning  Remains Full Code    Palliative Care  Current ECOG score 3  Discussed how Nutritional replacement will improve healing and may improve fatigue When appropriate encourage PT and OT to increase functional status to meet her goal of being able to consider further cancer treatment    Thank you for allowing me to participate in Rubina's care.       Electronically signed by ALBERTINA Abrams CNP on 2/9/2021 at 11:23 AM

## 2021-02-09 NOTE — PROGRESS NOTES
Physical Therapy  Physical Therapy   Facility/Department: [facility] MED SURG IC16/IC16-01    NAME: Timmy Nieves    : 1950 (69 y.o.)  MRN: [id number]    Account: [de-identified]  Gender: female    Hold PT treatment on this date d/t patient had a change in medical status. Pt moved from [address]  to [address]. Change in medical status discussed with supervising PT on this date. Please re-consult physical therapy when appropriate. Note written to attending hospitalist  via \"sticky note\" requesting updated Physical Therapy eval and treat orders when pt is appropriate for out of bed activity.       [facility]) Physical Therapy Department      Electronically signed by Kade Walton PTA on 21 at 8:27 AM EST

## 2021-02-09 NOTE — PROGRESS NOTES
Wound Ostomy Continence Nurse                                                                                Ostomy Referral Progress Note      NAME:  Ayaan Ocampo  MEDICAL RECORD NUMBER:  [id number]  AGE: 69 y.o. GENDER:  female  :  1950  TODAY'S DATE:  2021    Subjective     Ayaan Ocampo is a 69 y.o. female referred by:   [x] Physician  [] Nursing  [] Other:      Ileostomy and New    PAST MEDICAL HISTORY:        Diagnosis Date    Anxiety     panic attacks    Fibroids     has complete hysterctomy    Kidney stone     14 years ago       MEDICATIONS:    No current facility-administered medications on file prior to encounter. Current Outpatient Medications on File Prior to Encounter   Medication Sig Dispense Refill    ondansetron (ZOFRAN) 4 MG tablet Take 1 tablet by mouth every 12 hours as needed for Nausea or Vomiting 30 tablet 0    calcium citrate-vitamin D (CITRICAL + D) 315-250 MG-UNIT TABS per tablet Take 2 tablets by mouth daily      Ascorbic Acid (VITAMIN C) 250 MG tablet Take 500 mg by mouth daily         ALLERGIES:    Allergies   Allergen Reactions    Aspirin Other (See Comments)     GI issue       PAST SURGICAL HISTORY:    Past Surgical History:   Procedure Laterality Date    HYSTERECTOMY, TOTAL ABDOMINAL      LAPAROTOMY N/A 2021    EXPLORATORY LAPAROTOMY, SUBTOTAL COLECTOMY,  END ILLEOSTOMY, INSERTION GASTROSTOMY TUBE. performed by Blake Ferguson MD at [address] [facility] 2021    4,440 ml removed by Dr. Luana Honeycutt 2021    SIGMOIDOSCOPY BIOPSY FLEXIBLE performed by Bijal Matthew MD at [address] [facility]:    family history includes Atrial Fibrillation in her brother; Breast Cancer in her mother; Heart Disease in her father.     SOCIAL HISTORY:    Social History     Tobacco Use    Smoking status: Never Smoker    Smokeless tobacco: Never Used   Substance Use Topics  Alcohol use: Yes     Comment: occasional- socially    Drug use: Never       LABS:  WBC:    Lab Results   Component Value Date    WBC 10.4 02/09/2021     H/H:    Lab Results   Component Value Date    HGB 13.6 02/09/2021    HCT 43.9 02/09/2021     BMP:    Lab Results   Component Value Date     02/09/2021    K 4.0 02/09/2021     02/09/2021    CO2 10 02/09/2021    BUN 59 02/09/2021    LABALBU 3.1 02/02/2021    CREATININE 1.58 02/09/2021    CALCIUM 7.0 02/09/2021    GFRAA 38.9 02/09/2021    LABGLOM 32.2 02/09/2021    GLUCOSE 106 02/09/2021     PTT:  No results found for: APTT, PTT[APTT}  PT/INR:    Lab Results   Component Value Date    PROTIME 13.3 02/01/2021    INR 1.0 02/01/2021       Objective    BP 91/66   Pulse 92   Temp 98.6 °F (37 °C) (Oral)   Resp 22   Ht 5' 5\" (1.651 m)   Wt 150 lb (68 kg)   SpO2 100%   BMI 24.96 kg/m²     Emmanuel Risk Score Emmanuel Scale Score: 22    Assessment   Surgeon - Dr. Coretta Calix       Ileostomy Ileostomy RLQ (Active)   Stomal Appliance 1 piece;Clean;Dry; Intact 02/09/21 1330   Flange Size (inches) 3 Inches 02/09/21 1330   Stoma  Assessment Pink;Moist;Protrudes 02/09/21 1330   Mucocutaneous Junction Intact 02/09/21 1330   Stool Color Green 02/09/21 1330   Stool Appearance Watery 02/09/21 1330   Stool Amount Small 02/09/21 1330   Number of days: 0     Patient is POD #1, new ileostomy in the RLQ. Stoma is red, moist, and protrudes. Very little amount of green effluent in the appliance at this time. Appliance is clean a nd intact. Patient feeling very tired. Would like  to have education with her prior to discharge. Supplies left at the bedside. No other needs at this time - will continue to follow.        Intake/Output Summary (Last 24 hours) at 2/9/2021 1510  Last data filed at 2/9/2021 1300  Gross per 24 hour   Intake 5176 ml   Output 1945 ml   Net 3231 ml       Plan   Plan for Ostomy Care: See above      Ostomy Plan of Care [x] Supplies/Instructions left in room  [] Patient using home supplies  [x] Brand/supplies at bedside - [employer]    Current Diet: Diet NPO Time Specified Exceptions are: Ice Chips  Dietician consult:  Yes    Discharge Plan:  Placement for patient upon discharge: Undetermined     Outpatient visit plan No  Supplies given Yes   Samples requested N/A    Referrals:  []   [] [facility]  [] Supplies  [] Other      Patient/Caregiver Teaching:  Written Instructions given to patient/family: N/A  Teaching provided:  [] Reviewed GI and A&P        [] Supplies  [] Pouch emptying      [] Manipulate closure  [] Routine Care         [] Comment  [] Pouch maintenance           Level of patient/caregiver understanding able to:  [] Indicates understanding       [] Needs reinforcement  [] Unsuccessful      [] Verbal Understanding  [] Demonstrated understanding       [] No evidence of learning  [] Refused teaching         [x] N/A    Electronically signed by YURIY ReisN, RN, Eva Cochran on 2/9/2021 at 3:13 PM

## 2021-02-09 NOTE — PROGRESS NOTES
Hospitalist Progress Note      PCP: Jael Tavares MD    Date of Admission: 2/1/2021    Chief Complaint:    Chief Complaint   Patient presents with    Fatigue     sent by dr Vinny Isaacs for dehydration work up , patient has been vomiting and not keeping anything down and is very fatigued     Subjective:  Patient resting in bed, no cp, sob. Fatigued postop. Medications:  Reviewed    Infusion Medications    sodium chloride 125 mL/hr at 02/09/21 0143    HYDROmorphone      norepinephrine 20 mcg/min (02/09/21 0035)    dextrose       Scheduled Medications    insulin glargine  10 Units Subcutaneous Nightly    insulin lispro  0-6 Units Subcutaneous Q6H    famotidine (PEPCID) injection  20 mg Intravenous BID    pantoprazole  40 mg Oral QAM AC    spironolactone  50 mg Oral Daily    furosemide  20 mg Oral Daily    sodium chloride flush  10 mL Intravenous 2 times per day    enoxaparin  40 mg Subcutaneous Daily    senna  2 tablet Oral Nightly    docusate sodium  100 mg Oral BID    bisacodyl  10 mg Rectal Daily     PRN Meds: naloxone, glucose, dextrose, glucagon (rDNA), dextrose, potassium chloride, magnesium sulfate, sodium phosphate IVPB **OR** sodium phosphate IVPB, benzocaine, ketorolac, sodium chloride flush, promethazine **OR** ondansetron, acetaminophen **OR** acetaminophen      Intake/Output Summary (Last 24 hours) at 2/9/2021 0205  Last data filed at 2/9/2021 0030  Gross per 24 hour   Intake 4639 ml   Output 1445 ml   Net 3194 ml     Exam:    BP (!) 105/53   Pulse 91   Temp 97.6 °F (36.4 °C) (Oral)   Resp 20   Ht 5' 5\" (1.651 m)   Wt 150 lb (68 kg)   SpO2 100%   BMI 24.96 kg/m²     General appearance: NAD  HEENT: Conjunctivae/corneas clear. Neck:  Trachea midline. Respiratory:  Normal respiratory effort. Clear to auscultation  Cardiovascular: Regular rate and rhythm   Abdomen: Well wrapped, no drainage  Musculoskeletal: No clubbing, cyanosis or edema bilaterally.     Neuro: Non Focal Capillary Refill: Brisk,< 3 seconds   Peripheral Pulses: +2 palpable, equal bilaterally     Labs:   Recent Labs     02/06/21  0619 02/07/21  0706 02/08/21  0616   WBC 11.1* 13.5* 15.1*   HGB 13.9 14.2 13.9   HCT 42.1 42.9 41.8    345 297     Recent Labs     02/06/21  0619 02/07/21  0706 02/08/21  0616    134* 127*   K 3.4 3.3* 3.4    99 96   CO2 25 21 18*   BUN 19 34* 63*   CREATININE 0.83 1.27* 1.85*   CALCIUM 8.3* 8.3* 8.3*   PHOS  --  1.9*  --      No results for input(s): AST, ALT, BILIDIR, BILITOT, ALKPHOS in the last 72 hours. No results for input(s): INR in the last 72 hours. No results for input(s): Neldon Docker in the last 72 hours. Urinalysis:      Lab Results   Component Value Date    NITRU Negative 02/01/2021    BLOODU Negative 02/01/2021    SPECGRAV 1.065 02/01/2021    GLUCOSEU Negative 02/01/2021     Radiology:  FL BARIUM ENEMA   Final Result   Incomplete barium enema. Patient unable to tolerate procedure. There is diffuse dilation of the bowel. FL SMALL BOWEL FOLLOW THROUGH ONLY   Final Result      Dilated small bowel loops, with persistent delay at level of proximal jejunum. Contrast medium visualized ileocecal valve at 4 hours. Following remains air-filled and dilated at that time. Findings suggest stenosis/partial obstruction at level of distal    colon and at level of mid jejunum. XR CHEST ABDOMEN NG PLACEMENT   Final Result   INTERVAL PLACEMENT OF NASOGASTRIC TUBE AS DESCRIBED ABOVE      XR ABDOMEN (KUB) (SINGLE AP VIEW)   Final Result   DISTENDED DILATED LOOPS OF LARGE BOWEL WITH A TRANSITION POINT IN THE REGION OF THE DESCENDING COLON, SIGMOID. FINDINGS COULD SUGGEST THAT OF POSSIBLE HIGH-GRADE OBSTRUCTION. FURTHER EVALUATION IS WARRANTED. US PELVIS COMPLETE   Preliminary Result   FREE FLUID WITHIN THE PELVIS. EXAMINATION:  US DUP ABD PEL RETRO SCROT COMPLETE      CLINICAL HISTORY: Abnormal CT scan.       COMPARISONS:  NONE AVAILABLE TECHNIQUE:  Transabdominal ultrasound of the right upper quadrant with both duplex color ultrasound and spectral Doppler ultrasound with interrogation of the hepatic and portal venous system was performed. FINDINGS:     The visualized portion of the right lobe of the liver shows no focal parenchymal abnormalities. The hypoechoic area seen at the inferior medial aspect of the right lobe of liver on CT is not appreciated. Within the field-of-view there are findings of cholelithiasis. There is ascites present. The hepatic veins and portal veins are patent. Normal direction seen within the portal veins and hepatic vein. IMPRESSION:        1. ASCITES IS PRESENT WITHIN THE FIELD-OF-VIEW. 2. CHOLELITHIASIS. 3. UNREMARKABLE SONOGRAPHIC EXAMINATION OF THE HEPATIC AND PORTAL VEIN. US DUP ABD PEL RETRO SCROT COMPLETE   Preliminary Result   FREE FLUID WITHIN THE PELVIS. EXAMINATION:  US DUP ABD PEL RETRO SCROT COMPLETE      CLINICAL HISTORY: Abnormal CT scan. COMPARISONS:  NONE AVAILABLE      TECHNIQUE:  Transabdominal ultrasound of the right upper quadrant with both duplex color ultrasound and spectral Doppler ultrasound with interrogation of the hepatic and portal venous system was performed. FINDINGS:     The visualized portion of the right lobe of the liver shows no focal parenchymal abnormalities. The hypoechoic area seen at the inferior medial aspect of the right lobe of liver on CT is not appreciated. Within the field-of-view there are findings of cholelithiasis. There is ascites present. The hepatic veins and portal veins are patent. Normal direction seen within the portal veins and hepatic vein. IMPRESSION:        1. ASCITES IS PRESENT WITHIN THE FIELD-OF-VIEW. 2. CHOLELITHIASIS. 3. UNREMARKABLE SONOGRAPHIC EXAMINATION OF THE HEPATIC AND PORTAL VEIN.       US GUIDED PARACENTESIS   Final Result 1.  Status post technically successful ultrasound-guided paracentesis. Facundo Kasper is a Female of 69 years age, referred for Ultrasound Guided Paracentesis. PROCEDURE: Survey of the abdomen showed large amount of ascites fluid. After obtaining informed consent, the patient was positioned supine on the sonography table. Using ultrasound, the skin over the left hemiabdomen was locally anesthetized with 1% lidocaine. Following that, a Yueh needle was advanced into the fluid    pocket using ultrasound visualization. 4440cc, of clear yellow fluid were aspirated and sent for cytology, and pathology. The needle was removed, and hemostasis was obtained with pressure. A Band-Aid was placed. Post procedure images did not demonstrate hemorrhage at the target site. The patient tolerated the procedure well. The patient left the department in good condition. A radiology nurse was in presence monitoring vital signs, assisting throughout the procedure. US ABDOMEN LIMITED   Final Result   The portal veins, hepatic vein, and common hepatic artery are patent with normal direction of blood flow. No lesions are seen in the right liver lobe, though ultrasound is limited, and if there is clinical suspicion, MRI of the liver can be    obtained. CT ABDOMEN PELVIS W IV CONTRAST Additional Contrast? None   Final Result      Thickening and nodularity of the omentum concerning for peritoneal metastasis or peritonitis. A large amount of ascites is present. Two ill-defined lesions within the right lobe of the liver measuring 1 cm and 2 cm respectively are nonspecific but most concerning for metastatic lesions. Mild perivesicular inflammation. Correlation with urinalysis recommended to exclude cystitis. Large amount stool within the rectosigmoid colon may represent constipation and/or fecal impaction. Subtle nodularity of the liver suggests cirrhosis. Mild right-sided hydronephrosis without radiopaque calculus. Debris within the gallbladder likely represents gallstones and/or gallbladder sludge. Small right pleural effusion. All CT scans at this facility use dose modulation, iterative reconstruction, and/or weight based dosing when appropriate to reduce radiation dose to as low as reasonably achievable. MRI ABDOMEN W WO CONTRAST MRCP    (Results Pending)     Assessment/Plan:    1. 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 Possibly pancreatic vs cholangiocarcinoma based on CA 19-9; plan for MRCP when more stable; other sources being worked up by oncology  2. High grade obstruction of descending colon: Will need colectomy; patient is deciding; if she is agreeable then plan for OR tomorrow at 3pm  1. 2/8 - s/p colectomy with diverting ostomy, moved to icu for extensive procedure post op care. 3. Right sided pleural effusion:  Likely from the ascites; ascites drained  4. Functional Status: Fall precautions. Up with assistance. PT OT  5. Diet: NPO  6. DVT ppx: Lovenox SCDs  7. Disposition: Dependent on hospital course. Will discharge once medically stable. SW on board for discharge planning. Active Hospital Problems    Diagnosis Date Noted    Fatigue [R53.83]     Malignant ascites [R18.0] 02/01/2021     Additional work up or/and treatment plan may be added today or then after based on clinical progression. I am managing a portion of pt care. Some medical issues are handled by other specialists. Additional work up and treatment should be done in out pt setting by pt PCP and other out pt providers. In addition to examining and evaluating pt, I spent additional time explaining care, normal and abnormal findings, and treatment plan. All of pt questions were answered. Counseling, diet and education were  provided. Case will be discussed with nursing staff when appropriate. Family will be updated if and when appropriate.       Diet: Diet NPO Time Specified Exceptions are: Ice Chips    Code Status: Full Code    PT/OT Eval     Electronically signed by Misty Judd MD on 2/9/2021 at 2:05 AM

## 2021-02-09 NOTE — PROGRESS NOTES
CURRENT VITALS:  height is 5' 5\" (1.651 m) and weight is 150 lb (68 kg). Her oral temperature is 98.6 °F (37 °C). Her blood pressure is 91/65 and her pulse is 97. Her respiration is 23 and oxygen saturation is 100%. Temperature Range (24h):Temp: 98.6 °F (37 °C) Temp  Av.1 °F (35.1 °C)  Min: 90.9 °F (32.7 °C)  Max: 98.6 °F (37 °C)  BP Range (31F): Systolic (15MVB), UUC:033 , Min:63 , WTI:389     Diastolic (48QNO), GQZ:78, Min:34, Max:102    Pulse Range (24h): Pulse  Av  Min: 63  Max: 126  Respiration Range (24h): Resp  Avg: 10.7  Min: 0  Max: 32    GENERAL: alert, no distress  LUNGS: clear to ausculation, without wheezes, rales or rhonci  HEART: normal rate and regular rhythm  ABDOMEN: soft, non-tender, non-distended, ileostomy looks fine, YULISA serosanguineous, bowel sounds present in all 4 quadrants and no guarding or peritoneal signs  EXTERMITY: no cyanosis, clubbing or edema  In: 6106 [P.O.:60; I.V.:6046]  Out: 1515 [Urine:525; Drains:790]  Date 21 0000 - 21 2359   Shift 9903-0911 1826-9779 7830-9790 24 Hour Total   INTAKE   P.O.(mL/kg/hr) 60(0.1)   60   I. V.(mL/kg) 4190(16.8)   3757(55.4)   Shift Total(mL/kg) 4525(46.4)   4187(42.4)   OUTPUT   Urine(mL/kg/hr) 350(0.6)   350   Drains(mL/kg) 230(3.4)   230(3.4)   Shift Total(mL/kg) 580(8.5)   580(8.5)   Weight (kg) 68 68 68 68       LABS  Recent Labs     21  0706 21  0616 21  0528   WBC 13.5* 15.1* 10.4   HGB 14.2 13.9 13.6   HCT 42.9 41.8 43.9    297 230   * 127* 131*   K 3.3* 3.4 4.0   CL 99 96 109*   CO2 21 18* 10*   BUN 34* 63* 59*   CREATININE 1.27* 1.85* 1.58*   MG 1.9 2.0  --    PHOS 1.9*  --   --    CALCIUM 8.3* 8.3* 7.0*      No results for input(s): PTT, INR in the last 72 hours. Invalid input(s): PT  No results for input(s): AST, ALT, BILITOT, BILIDIR, AMYLASE, LIPASE, LDH, LACTA in the last 72 hours.     RADIOLOGY  Echocardiogram Complete 2d With Doppler With Color    Result Date: 2/3/2021 Transthoracic Echocardiography Report (TTE)  Demographics  Patient Name    Concepcion Polanco Gender               Female  Patient Number  [id number]       Race                                                  Ethnicity  Visit Number    [id number]      Room Number          U469  Corporate ID                   Date of Study        02/03/2021  Accession       [id number]     Referring Physician  Number  Date of Birth   1950     Sonographer          [address] [facility]  Age             69 year(s)     Interpreting         [facility])                                 Physician            Cardiology                                                      Giovanna Zavala MD Procedure Type of Study  TTE procedure:ECHO COMPLETE 2D W/DOP W/COLOR. Procedure Date Date: 02/03/2021 Start: 08:33 AM Study Location: Portable Technical Quality: Poor visualization due to poor acoustical window. Indications:LVF. Patient Status: Routine Height: 65 inches Weight: 153 pounds BSA: 1.77 m^2 BMI: 25.46 kg/m^2  Conclusions  Summary  Very limited and technically difficult study. LV Function appears to be preserved. will need repeat at later time. when more stable  Signature  ----------------------------------------------------------------  Electronically signed by Giovanna Zavala MD(Interpreting  physician) on 02/03/2021 12:07 PM  ----------------------------------------------------------------    Xr Abdomen (kub) (single Ap View)    Result Date: 2/4/2021  EXAMINATION: KUB CLINICAL HISTORY: No bowel movement for 2 weeks. COMPARISON :None FINDINGS: 3 view of the abdomen  submitted. There are multiply dilated and distended loops of large bowel. The transition point may be within the region of the distal descending colon and sigmoid. Bowel gas is seen within small bowel. 3 view of the abdomen shows bowel gas in both large and small bowel. DISTENDED DILATED LOOPS OF LARGE BOWEL WITH A TRANSITION POINT IN THE REGION OF THE DESCENDING COLON, SIGMOID. FINDINGS COULD SUGGEST THAT OF POSSIBLE HIGH-GRADE OBSTRUCTION. FURTHER EVALUATION IS WARRANTED. Us Pelvis Complete    Result Date: 2/3/2021  EXAMINATION:  US PELVIS COMPLETE CLINICAL HISTORY: Ascites. COMPARISONS:  NONE AVAILABLE TECHNIQUE:  Transabdominal ultrasound of the pelvis. FINDINGS:  The uterus is surgically absent. Both left and right ovaries are surgically absent. There is free fluid within the pelvis. FREE FLUID WITHIN THE PELVIS. EXAMINATION:  US DUP ABD PEL RETRO SCROT COMPLETE CLINICAL HISTORY: Abnormal CT scan. COMPARISONS:  NONE AVAILABLE TECHNIQUE:  Transabdominal ultrasound of the right upper quadrant with both duplex color ultrasound and spectral Doppler ultrasound with interrogation of the hepatic and portal venous system was performed. FINDINGS:  The visualized portion of the right lobe of the liver shows no focal parenchymal abnormalities. The hypoechoic area seen at the inferior medial aspect of the right lobe of liver on CT is not appreciated. Within the field-of-view there are findings of cholelithiasis. There is ascites present. The hepatic veins and portal veins are patent. Normal direction seen within the portal veins and hepatic vein. IMPRESSION:  1. ASCITES IS PRESENT WITHIN THE FIELD-OF-VIEW. 2. CHOLELITHIASIS. 3. UNREMARKABLE SONOGRAPHIC EXAMINATION OF THE HEPATIC AND PORTAL VEIN.     Ct Abdomen Pelvis W Iv Contrast Additional Contrast? None    Result Date: 2/1/2021 EXAM:  CT ABDOMEN PELVIS W IV CONTRAST History: Abdominal distention. Abdominal pain. Technique: Multiple contiguous axial images were obtained of the abdomen and pelvis from an level of the lung bases through the ischial tuberosities with IV contrast. Multiplanar reformats were obtained. Delayed images were obtained. Comparison: None available Findings: Small right pleural effusion. Bibasilar subsegmental atelectasis. Subtle nodularity of the liver. A 1 cm hypodense lesion within the right lobe of the liver is seen on axial series 2 image 29 and an ill-defined area of hypodensity within the inferior right lobe of the liver measuring approximately 2 cm is seen on axial  series 2 image 38. The gallbladder is physiologically distended and contains multiple small densities within the dependent portion. No gallbladder wall thickening is identified. The stomach, spleen, pancreas, and adrenal glands appear within normal limits. Small hiatal hernia. There is thickening and nodularity of the omentum. There is a large amount of ascites demonstrating simple fluid density. The kidneys enhance uniformly. There is mild right-sided hydronephrosis however no radiopaque or urinary tract calculi identified. No left-sided urinary tract calculi or hydronephrosis. Urinary bladder is suboptimally distended. There is mild perivesicular inflammation. The uterus is surgically absent Abdominal aorta is nonaneurysmal  . No retroperitoneal or abdominal/pelvic lymphadenopathy. No small bowel obstruction. No overt colonic mass or pericolonic inflammation although the large amount of ascites obscures evaluation for inflammation. There is a large amount of stool within the rectosigmoid colon. The appendix is not definitively visualized. No pneumoperitoneum or portal venous gas. No acute or aggressive osseous abnormality. Degenerative changes of the spine. Thickening and nodularity of the omentum concerning for peritoneal metastasis or peritonitis. A large amount of ascites is present. Two ill-defined lesions within the right lobe of the liver measuring 1 cm and 2 cm respectively are nonspecific but most concerning for metastatic lesions. Mild perivesicular inflammation. Correlation with urinalysis recommended to exclude cystitis. Large amount stool within the rectosigmoid colon may represent constipation and/or fecal impaction. Subtle nodularity of the liver suggests cirrhosis. Mild right-sided hydronephrosis without radiopaque calculus. Debris within the gallbladder likely represents gallstones and/or gallbladder sludge. Small right pleural effusion. All CT scans at this facility use dose modulation, iterative reconstruction, and/or weight based dosing when appropriate to reduce radiation dose to as low as reasonably achievable. Us Abdomen Limited    Result Date: 2/3/2021  CLINICAL INDICATION:   Patient with new ascites and abdominal pain, concern for metastatic disease COMPARISON: CT dating February 1, 2021 DISCUSSION:     Transverse and longitudinal images of the right upper quadrant of the abdomen were obtained with color Doppler interrogation of the hepatic vessels. The liver is normal  in echogenicity and no evidence of focal masses seen in the right liver lobe. The hepatic vein, and portal veins are patent. There is normal direction of blood flow in the portal veins towards the liver. The main portal vein, right portal vein, left portal vein, and hepatic vein are patent with normal direction. The common hepatic artery is patent with velocity measuring 72 cm/s. The gallbladder is seen with echogenic gallstones. The gallbladder wall measures 2.4 mm. The portal veins, hepatic vein, and common hepatic artery are patent with normal direction of blood flow. No lesions are seen in the right liver lobe, though ultrasound is limited, and if there is clinical suspicion, MRI of the liver can be obtained. Us Dup Abd Pel Retro Scrot Complete    Result Date: 2/3/2021  EXAMINATION:  US PELVIS COMPLETE CLINICAL HISTORY: Ascites. COMPARISONS:  NONE AVAILABLE TECHNIQUE:  Transabdominal ultrasound of the pelvis. FINDINGS:  The uterus is surgically absent. Both left and right ovaries are surgically absent. There is free fluid within the pelvis. FREE FLUID WITHIN THE PELVIS. EXAMINATION:  US DUP ABD PEL RETRO SCROT COMPLETE CLINICAL HISTORY: Abnormal CT scan. COMPARISONS:  NONE AVAILABLE TECHNIQUE:  Transabdominal ultrasound of the right upper quadrant with both duplex color ultrasound and spectral Doppler ultrasound with interrogation of the hepatic and portal venous system was performed. FINDINGS:  The visualized portion of the right lobe of the liver shows no focal parenchymal abnormalities. The hypoechoic area seen at the inferior medial aspect of the right lobe of liver on CT is not appreciated. Within the field-of-view there are findings of cholelithiasis. There is ascites present. The hepatic veins and portal veins are patent. Normal direction seen within the portal veins and hepatic vein. IMPRESSION:  1. ASCITES IS PRESENT WITHIN THE FIELD-OF-VIEW. 2. CHOLELITHIASIS. 3. UNREMARKABLE SONOGRAPHIC EXAMINATION OF THE HEPATIC AND PORTAL VEIN. Xr Chest Abdomen Ng Placement    Result Date: 2/4/2021  EXAMINATION: LOW CHEST HIGH KUB CLINICAL HISTORY: NG tube placement COMPARISON : The right fourth 2021 FINDINGS: Interval placement of nasogastric tube. The tip is within the stomach. Within the field-of-view there is still prominent dilated loops of large bowel. .     INTERVAL PLACEMENT OF NASOGASTRIC TUBE AS DESCRIBED ABOVE    Us Guided Paracentesis Small bowel follow-through. HISTORY: Postcolonoscopy. COMPARISON: CT abdomen pelvis, February 1, 2021. FINDINGS:  image shows a nasogastric tube in stomach. Marked diffuse dilatation of colon cecum sigmoid colon. 0 minute film shows administration of oral contrast medium via nasogastric tube with contrast medium filling in stomach. 30 minute image shows contrast medium within attenuated distal stomach. Contrast medium visualized filling the duodenum with loop of small bowel coursing into right lower quadrant. Contrast medium also fills normal caliber jejunal loops and epigastric area residual contrast medium from attempted barium enema identified sigmoid colon. 60 minute image shows contrast medium pooling within jejunal loops in the right lower quadrant, and contrast medium to a lesser degree filling jejunal loops found in the left perigastric region. 90 minute image shows mild progression of contrast medium into proximal and mid jejunal loops. Several loops shows little progression of contrast medium passed level of mid jejunum. 4 hour image shows contrast medium with bowel loops in left lower quadrant. However, contrast media now visualized overlying region of the ileocecal valve and portion of ascending colon. 6 hour image shows additional contrast medium entering ascending colon and now entering proximal transverse colon, splenic flexure, and proximal descending colon. There is marked evaluate attenuation of contrast medium within the small bowel. At 8.5 hours, there is contrast medium continues to empty from the small bowel, and now is layering, in the dilated ascending colon. Dilatation of the cecum, measuring approximately 13.7 cm is identified. Residual contrast medium still fills the rectum. Final imaging of 21.5 hours shows near complete evacuation of contrast medium from the small bowel, January dilatation seen to the transverse diameter 17.2 cm, and contrast medium filling the ascending colon. There is still markedly pronounced more distal colon. Dilated small bowel loops, with persistent delay at level of proximal jejunum. Contrast medium visualized ileocecal valve at 4 hours. Following remains air-filled and dilated at that time. Findings suggest stenosis/partial obstruction at level of distal colon and at level of mid jejunum. Fl Barium Enema    Result Date: 2/8/2021  COMPARISON: February 4, 2021 Fluoroscopic time. 1.6 minutes 17 images were obtained. HISTORY:    Abdominal pain, possible obstruction PATIENT NAME: JOEY SALDANA: TECHNIQUE: FL BARIUM ENEMA FINDINGS: Air is seen within bowel. An NG tube is seen in place with tip in the area of the stomach. Dilated loops of bowel are again seen. The most dilated loop measures up to 11.7 cm. Barium was introduced into the rectum. When the barium went into the distal sigmoid colon, patient was  unable to tolerate the barium and asked for the procedure to be stop. The barium was drained from the colon. Incomplete barium enema. Patient unable to tolerate procedure. There is diffuse dilation of the bowel.     Electronically signed by Heri Guzman MD on 2/9/2021 at 8:15 AM

## 2021-02-09 NOTE — CARE COORDINATION
Rounds done with nurse and pt came to ICU post op and is on IV levo. She will need PT/OT reordered when stable. Palliative care is also ordered. Will follow to determine future needs and plan.

## 2021-02-09 NOTE — FLOWSHEET NOTE
Received pr from PACU Alert and oriented Pt with PCA pump of dilaudid  SBP 70-80's Dr. Martha Akbar aware pt received fluid bolus and started on levophed gtt YULISA draining red serous drainage Pt taking small amt of ice chips, Respirations regular and unlabored.   Denies discomfort PCA controller in hand

## 2021-02-09 NOTE — CONSULTS
Pulmonary and Critical Care Medicine  Consult Note  Encounter Date: 2021 10:58 AM    Ms. Luis Manuel Bermudez is a 69 y.o. female  : 1950  Requesting Provider: Oliva Pablo MD    Reason for request shock          HISTORY OF PRESENT ILLNESS:    Patient is 69 y.o. presents with peritoneal Kussman ptosis and large bowel obstruction, yesterday she underwent exploratory laparotomy with subtotal colectomy and ileostomy. Estimated blood loss 200 cc, surgery essentially was uneventful, however patient hypotensive postop, she is currently on Levophed 18 mics per minute, she denies any chest pain, no difficulty breathing, no abdominal pain, no nausea no vomiting no bowel movement in the ileostomy bag, urine output 625 cc, drainage output 790 cc, serosanguineous, no fever overnight, she is saturating 100% on 2 L nasal cannula. Past Medical History:        Diagnosis Date    Anxiety     panic attacks    Fibroids 1998    has complete hysterctomy    Kidney stone     14 years ago       Past Surgical History:        Procedure Laterality Date    HYSTERECTOMY, TOTAL ABDOMINAL      LAPAROTOMY N/A 2021    EXPLORATORY LAPAROTOMY, SUBTOTAL COLECTOMY,  END ILLEOSTOMY, INSERTION GASTROSTOMY TUBE. performed by Roosevelt Cordero MD at [address] [place] [address] 2021    4,440 ml removed by Dr. Estefany Howard 2021    SIGMOIDOSCOPY BIOPSY FLEXIBLE performed by Noelle Zapien MD at [facility] History:     reports that she has never smoked. She has never used smokeless tobacco. She reports current alcohol use. She reports that she does not use drugs.     Family History:       Problem Relation Age of Onset    Breast Cancer Mother     Heart Disease Father     Atrial Fibrillation Brother        Allergies:  Aspirin        MEDICATIONS during current hospitalization:    Continuous Infusions:   sodium chloride      sodium chloride 125 mL/hr at 21 0143  HYDROmorphone      norepinephrine 12 mcg/min (02/09/21 1053)    dextrose         Scheduled Meds:   lidocaine  5 mL Intradermal Once    sodium chloride flush  10 mL Intravenous 2 times per day    insulin glargine  10 Units Subcutaneous Nightly    insulin lispro  0-6 Units Subcutaneous Q6H    famotidine (PEPCID) injection  20 mg Intravenous BID    pantoprazole  40 mg Oral QAM AC    spironolactone  50 mg Oral Daily    furosemide  20 mg Oral Daily    sodium chloride flush  10 mL Intravenous 2 times per day    enoxaparin  40 mg Subcutaneous Daily    senna  2 tablet Oral Nightly    docusate sodium  100 mg Oral BID    bisacodyl  10 mg Rectal Daily       PRN Meds:sodium chloride flush, naloxone, glucose, dextrose, glucagon (rDNA), dextrose, potassium chloride, magnesium sulfate, sodium phosphate IVPB **OR** sodium phosphate IVPB, benzocaine, ketorolac, sodium chloride flush, promethazine **OR** ondansetron, acetaminophen **OR** acetaminophen        REVIEW OF SYSTEMS:  ROS: 10 organs review of system is done including general, psychological, ENT, hematological, endocrine, respiratory, cardiovascular, gastrointestinal, musculoskeletal, neurological,  allergy and Immunology is done and is otherwise negative. PHYSICAL EXAM:    Vitals:  BP 91/65   Pulse 97   Temp 98.6 °F (37 °C) (Oral)   Resp 23   Ht 5' 5\" (1.651 m)   Wt 150 lb (68 kg)   SpO2 100%   BMI 24.96 kg/m²     General: alert, cooperative, no distress  Head: normocephalic, atraumatic  Eyes:No gross abnormalities. ENT:  MMM no lesions  Neck:  supple and no masses  Chest : clear to auscultation bilaterally- no wheezes, rales or rhonchi, normal air movement, no respiratory distress  Heart[de-identified] Heart sounds are normal.  Regular rate and rhythm without murmur, gallop or rub. ABD: Soft, mild tenderness, no guarding or rebound, she has ileostomy bag on the right, mid abdominal surgical wound with surgical dressing and mild serosanguineous drain on the pads, she has 2 YULISA drain  Musculoskeletal : no cyanosis, no clubbing and no edema  Neuro:  Grossly normal  Skin: No rashes or nodules noted. Lymph node:  no cervical nodes  Urology: Yes Aguilar   Psychiatric: appropriate        Data Review  Recent Labs     02/07/21  0706 02/08/21  0616 02/09/21  0528   WBC 13.5* 15.1* 10.4   HGB 14.2 13.9 13.6   HCT 42.9 41.8 43.9    297 230      Recent Labs     02/07/21  0706 02/08/21  0616 02/09/21  0528   * 127* 131*   K 3.3* 3.4 4.0   CL 99 96 109*   CO2 21 18* 10*   BUN 34* 63* 59*   CREATININE 1.27* 1.85* 1.58*   GLUCOSE 230* 233* 106*       MV Settings: ABGs: No results for input(s): PHART, ENZ8ZZP, PO2ART, PJE9IHD, BEART, G8CUXDRG, SDU5JST in the last 72 hours. O2 Device: Nasal cannula  O2 Flow Rate (L/min): 2 L/min  Lab Results   Component Value Date    LACTA 3.4 02/01/2021       Radiology  I personally reviewed imaging studies and Chest is clear on XR chest and abdomen          Assessment, plan: This is a critically ill patient at risk of deterioration / death , needing close ICU monitoring and intervention due to below noted problems       · Shock, most probably hypovolemic, septic etiology is less likely  · Small bowel obstruction with peritoneal carcinomatosis status post subtotal colectomy  · Acute kidney injury, improving slowly     Recommendation  · Levophed to maintain mean arterial pressure 65- 70   · Albumin 50 g x 1  ·  cc bolus x1  · LR at 150 cc/h  · Monitor renal function and urine output  · Start empiric Zosyn  · Panculture  · We will place PICC line for central access    Due to the immediate potential for life-threatening deterioration due to shock, I spent 32 minutes providing critical care. This time is excluding time spent performing procedures. Thank you for consultation    Electronically signed by Chris Wells MD, FCCP,  on 2/9/2021 at 10:58 AM

## 2021-02-10 PROBLEM — E43 SEVERE MALNUTRITION (HCC): Status: ACTIVE | Noted: 2021-02-10

## 2021-02-10 LAB
ANION GAP SERPL CALCULATED.3IONS-SCNC: 9 MEQ/L (ref 9–15)
BANDED NEUTROPHILS RELATIVE PERCENT: 17 % (ref 5–11)
BASOPHILS ABSOLUTE: 0 K/UL (ref 0–0.2)
BASOPHILS RELATIVE PERCENT: 0.1 %
BUN BLDV-MCNC: 42 MG/DL (ref 8–23)
CALCIUM SERPL-MCNC: 7 MG/DL (ref 8.5–9.9)
CHLORIDE BLD-SCNC: 108 MEQ/L (ref 95–107)
CO2: 18 MEQ/L (ref 20–31)
CREAT SERPL-MCNC: 1.26 MG/DL (ref 0.5–0.9)
EOSINOPHILS ABSOLUTE: 0 K/UL (ref 0–0.7)
EOSINOPHILS RELATIVE PERCENT: 0.1 %
GFR AFRICAN AMERICAN: 50.5
GFR NON-AFRICAN AMERICAN: 41.8
GLUCOSE BLD-MCNC: 105 MG/DL (ref 60–115)
GLUCOSE BLD-MCNC: 110 MG/DL (ref 60–115)
GLUCOSE BLD-MCNC: 67 MG/DL (ref 60–115)
GLUCOSE BLD-MCNC: 68 MG/DL (ref 60–115)
GLUCOSE BLD-MCNC: 80 MG/DL (ref 60–115)
GLUCOSE BLD-MCNC: 92 MG/DL (ref 70–99)
HCT VFR BLD CALC: 27.9 % (ref 37–47)
HEMOGLOBIN: 9.2 G/DL (ref 12–16)
LYMPHOCYTES ABSOLUTE: 0.6 K/UL (ref 1–4.8)
LYMPHOCYTES RELATIVE PERCENT: 4 %
MAGNESIUM: 1.3 MG/DL (ref 1.7–2.4)
MCH RBC QN AUTO: 28.6 PG (ref 27–31.3)
MCHC RBC AUTO-ENTMCNC: 33 % (ref 33–37)
MCV RBC AUTO: 86.6 FL (ref 82–100)
MONOCYTES ABSOLUTE: 0.4 K/UL (ref 0.2–0.8)
MONOCYTES RELATIVE PERCENT: 2.8 %
NEUTROPHILS ABSOLUTE: 13.4 K/UL (ref 1.4–6.5)
NEUTROPHILS RELATIVE PERCENT: 76 %
PDW BLD-RTO: 13.9 % (ref 11.5–14.5)
PERFORMED ON: NORMAL
PLATELET # BLD: 222 K/UL (ref 130–400)
PLATELET SLIDE REVIEW: NORMAL
POTASSIUM REFLEX MAGNESIUM: 3.5 MEQ/L (ref 3.4–4.9)
RBC # BLD: 3.22 M/UL (ref 4.2–5.4)
RBC # BLD: NORMAL 10*6/UL
SODIUM BLD-SCNC: 135 MEQ/L (ref 135–144)
WBC # BLD: 14.4 K/UL (ref 4.8–10.8)

## 2021-02-10 PROCEDURE — 94761 N-INVAS EAR/PLS OXIMETRY MLT: CPT

## 2021-02-10 PROCEDURE — 2580000003 HC RX 258: Performed by: INTERNAL MEDICINE

## 2021-02-10 PROCEDURE — 99024 POSTOP FOLLOW-UP VISIT: CPT | Performed by: COLON & RECTAL SURGERY

## 2021-02-10 PROCEDURE — 2580000003 HC RX 258: Performed by: COLON & RECTAL SURGERY

## 2021-02-10 PROCEDURE — 85025 COMPLETE CBC W/AUTO DIFF WBC: CPT

## 2021-02-10 PROCEDURE — 1210000000 HC MED SURG R&B

## 2021-02-10 PROCEDURE — 99213 OFFICE O/P EST LOW 20 MIN: CPT

## 2021-02-10 PROCEDURE — 6360000002 HC RX W HCPCS: Performed by: INTERNAL MEDICINE

## 2021-02-10 PROCEDURE — 99233 SBSQ HOSP IP/OBS HIGH 50: CPT | Performed by: INTERNAL MEDICINE

## 2021-02-10 PROCEDURE — 83735 ASSAY OF MAGNESIUM: CPT

## 2021-02-10 PROCEDURE — 6360000002 HC RX W HCPCS: Performed by: COLON & RECTAL SURGERY

## 2021-02-10 PROCEDURE — 6370000000 HC RX 637 (ALT 250 FOR IP): Performed by: COLON & RECTAL SURGERY

## 2021-02-10 PROCEDURE — 2700000000 HC OXYGEN THERAPY PER DAY

## 2021-02-10 PROCEDURE — 99231 SBSQ HOSP IP/OBS SF/LOW 25: CPT | Performed by: NURSE PRACTITIONER

## 2021-02-10 PROCEDURE — 36415 COLL VENOUS BLD VENIPUNCTURE: CPT

## 2021-02-10 PROCEDURE — 93010 ELECTROCARDIOGRAM REPORT: CPT | Performed by: INTERNAL MEDICINE

## 2021-02-10 PROCEDURE — 80048 BASIC METABOLIC PNL TOTAL CA: CPT

## 2021-02-10 RX ORDER — SODIUM CHLORIDE, SODIUM LACTATE, POTASSIUM CHLORIDE, AND CALCIUM CHLORIDE .6; .31; .03; .02 G/100ML; G/100ML; G/100ML; G/100ML
500 INJECTION, SOLUTION INTRAVENOUS ONCE
Status: COMPLETED | OUTPATIENT
Start: 2021-02-10 | End: 2021-02-10

## 2021-02-10 RX ORDER — MAGNESIUM SULFATE IN WATER 40 MG/ML
2000 INJECTION, SOLUTION INTRAVENOUS ONCE
Status: COMPLETED | OUTPATIENT
Start: 2021-02-10 | End: 2021-02-10

## 2021-02-10 RX ADMIN — SODIUM CHLORIDE, POTASSIUM CHLORIDE, SODIUM LACTATE AND CALCIUM CHLORIDE 500 ML: 600; 310; 30; 20 INJECTION, SOLUTION INTRAVENOUS at 07:45

## 2021-02-10 RX ADMIN — Medication 10 ML: at 20:48

## 2021-02-10 RX ADMIN — DEXTROSE MONOHYDRATE 12.5 G: 25 INJECTION, SOLUTION INTRAVENOUS at 18:34

## 2021-02-10 RX ADMIN — SODIUM CHLORIDE, POTASSIUM CHLORIDE, SODIUM LACTATE AND CALCIUM CHLORIDE: 600; 310; 30; 20 INJECTION, SOLUTION INTRAVENOUS at 07:41

## 2021-02-10 RX ADMIN — MAGNESIUM SULFATE HEPTAHYDRATE 2000 MG: 40 INJECTION, SOLUTION INTRAVENOUS at 10:08

## 2021-02-10 RX ADMIN — PIPERACILLIN AND TAZOBACTAM 3375 MG: 3; .375 INJECTION, POWDER, LYOPHILIZED, FOR SOLUTION INTRAVENOUS at 12:00

## 2021-02-10 RX ADMIN — SODIUM CHLORIDE, POTASSIUM CHLORIDE, SODIUM LACTATE AND CALCIUM CHLORIDE: 600; 310; 30; 20 INJECTION, SOLUTION INTRAVENOUS at 00:36

## 2021-02-10 RX ADMIN — ENOXAPARIN SODIUM 40 MG: 40 INJECTION SUBCUTANEOUS at 07:41

## 2021-02-10 RX ADMIN — PIPERACILLIN AND TAZOBACTAM 3375 MG: 3; .375 INJECTION, POWDER, LYOPHILIZED, FOR SOLUTION INTRAVENOUS at 20:49

## 2021-02-10 RX ADMIN — SODIUM CHLORIDE, POTASSIUM CHLORIDE, SODIUM LACTATE AND CALCIUM CHLORIDE 500 ML: 600; 310; 30; 20 INJECTION, SOLUTION INTRAVENOUS at 10:08

## 2021-02-10 RX ADMIN — PIPERACILLIN AND TAZOBACTAM 3375 MG: 3; .375 INJECTION, POWDER, LYOPHILIZED, FOR SOLUTION INTRAVENOUS at 03:43

## 2021-02-10 RX ADMIN — SODIUM CHLORIDE, POTASSIUM CHLORIDE, SODIUM LACTATE AND CALCIUM CHLORIDE: 600; 310; 30; 20 INJECTION, SOLUTION INTRAVENOUS at 20:52

## 2021-02-10 RX ADMIN — Medication 10 ML: at 07:54

## 2021-02-10 RX ADMIN — SENNOSIDES 17.2 MG: 8.6 TABLET, FILM COATED ORAL at 20:48

## 2021-02-10 ASSESSMENT — ENCOUNTER SYMPTOMS
NAUSEA: 0
ANAL BLEEDING: 0
BACK PAIN: 0
ABDOMINAL PAIN: 0
CONSTIPATION: 1
COUGH: 0
VOMITING: 0
SHORTNESS OF BREATH: 0
CHOKING: 0
VOICE CHANGE: 0
RECTAL PAIN: 0
ABDOMINAL DISTENTION: 1
EYE DISCHARGE: 0
COLOR CHANGE: 0
BLOOD IN STOOL: 0
CHEST TIGHTNESS: 0
TROUBLE SWALLOWING: 0
SORE THROAT: 0
APNEA: 0
DIARRHEA: 0
STRIDOR: 0
WHEEZING: 0

## 2021-02-10 ASSESSMENT — PAIN SCALES - GENERAL
PAINLEVEL_OUTOF10: 0

## 2021-02-10 NOTE — PROGRESS NOTES
Pt Name: Jesse Hernandez  Medical Record Number: [id number]  Date of Birth 1950   Admit date 2/1/2021  1:30 PM  Today's Date: 2/10/2021     ASSESSMENT  1. Hospital day # 9  2 postop day #2 subtotal colectomy/gastrostomy/ileostomy    PLAN  1. Clear liquids  2. Supplement nutrition with tube feeds through gastrostomy per dietary  3. Keep YULISA    SUBJECTIVE  Chief complaint: Follow-up laparotomy  Afebrile, vital signs are stable. She denies any nausea or vomiting, has not passed flatus or had a bowel movement. She is tolerating a DIET TUBE FEED CONTINUOUS/CYCLIC NPO; STANDARD WITH FIBER (Jevity 1.2); Gastrostomy; Continuous; 20; 20; 24; Exceptions are: Ice Chips. Her pain is well controlled on current medications. She has been ambulating in the halls. has a past medical history of Anxiety, Fibroids, and Kidney stone. CURRENT MEDS  Scheduled Meds:   sodium chloride flush  10 mL Intravenous 2 times per day    piperacillin-tazobactam  3,375 mg Intravenous Q8H    [Held by provider] insulin glargine  10 Units Subcutaneous Nightly    insulin lispro  0-6 Units Subcutaneous Q6H    pantoprazole  40 mg Oral QAM AC    [Held by provider] spironolactone  50 mg Oral Daily    [Held by provider] furosemide  20 mg Oral Daily    sodium chloride flush  10 mL Intravenous 2 times per day    enoxaparin  40 mg Subcutaneous Daily    senna  2 tablet Oral Nightly     Continuous Infusions:   lactated ringers 125 mL/hr at 02/10/21 0741    dextrose       PRN Meds:. HYDROmorphone, sodium chloride flush, glucose, dextrose, glucagon (rDNA), dextrose, potassium chloride, magnesium sulfate, sodium phosphate IVPB **OR** sodium phosphate IVPB, benzocaine, ketorolac, sodium chloride flush, promethazine **OR** ondansetron, acetaminophen **OR** acetaminophen    OBJECTIVE CURRENT VITALS:  height is 5' 5\" (1.651 m) and weight is 150 lb (68 kg). Her oral temperature is 97.5 °F (36.4 °C). Her blood pressure is 120/58 (abnormal) and her pulse is 86. Her respiration is 16 and oxygen saturation is 100%. Temperature Range (24h):Temp: 97.5 °F (36.4 °C) Temp  Av.7 °F (36.5 °C)  Min: 97.5 °F (36.4 °C)  Max: 97.8 °F (36.6 °C)  BP Range (79D): Systolic (88BUB), BNE:298 , Min:86 , TYV:533     Diastolic (45YUI), QCH:25, Min:36, Max:60    Pulse Range (24h): Pulse  Av.7  Min: 77  Max: 101  Respiration Range (24h): Resp  Av.3  Min: 12  Max: 31    GENERAL: alert, no distress  LUNGS: clear to ausculation, without wheezes, rales or rhonci  HEART: normal rate and regular rhythm  ABDOMEN: soft, non-tender, YULISA serosanguineous, bowel sounds present in all 4 quadrants and no guarding or peritoneal signs  EXTERMITY: no cyanosis, clubbing or edema  In: 3643 [P.O.:60; I.V.:3533]  Out:  [Urine:1050; Drains:325]  Date 02/10/21 0000 - 02/10/21 2359   Shift 1753-6288 1983-7847 0344-8073 24 Hour Total   INTAKE   P.O.(mL/kg/hr) 60(0.1) 0(0)  60   I. V.(mL/kg) 1405(20.6) 1820(26.7) 308(4.5) 3533(51.9)   IV Piggyback(mL/kg) 50(0.7)   50(0.7)   Shift Total(mL/kg) 1942(11.6) 1820(26.7) 308(4.5) 1978(92.2)   OUTPUT   Urine(mL/kg/hr) 650(1.2) 400(0.7)  1050   Emesis/NG output(mL/kg) 150(2.2) 400(5.9)  550(8.1)   Drains(mL/kg) 120(1.8) 205(3)  325(4.8)   Stool(mL/kg)  0(0)  0(0)   Shift Total(mL/kg) 467(91.4) 7546(09.9)  1925(28.3)   Weight (kg) 68 68 68 68       LABS  Recent Labs     21  0616 21  0528 02/10/21  0508   WBC 15.1* 10.4 14.4*   HGB 13.9 13.6 9.2*   HCT 41.8 43.9 27.9*    230 222   * 131* 135   K 3.4 4.0 3.5   CL 96 109* 108*   CO2 18* 10* 18*   BUN 63* 59* 42*   CREATININE 1.85* 1.58* 1.26*   MG 2.0  --  1.3*   CALCIUM 8.3* 7.0* 7.0*      No results for input(s): PTT, INR in the last 72 hours.     Invalid input(s): PT No results for input(s): AST, ALT, BILITOT, BILIDIR, AMYLASE, LIPASE, LDH, LACTA in the last 72 hours. RADIOLOGY  Echocardiogram Complete 2d With Doppler With Color    Result Date: 2/3/2021  Transthoracic Echocardiography Report (TTE)  Demographics  Patient Name    Clement Heads Gender               Female  Patient Number  [id number]       Race                                                  Ethnicity  Visit Number    [id number]      Room Number          J806  Corporate ID                   Date of Study        02/03/2021  Accession       [id number]     Referring Physician  Number  Date of Birth   1950     Sonographer          [address] [place] [address]  Age             69 year(s)     Interpreting         [address]                                 Physician            Cardiology                                                      July Menjivar MD Procedure Type of Study  TTE procedure:ECHO COMPLETE 2D W/DOP W/COLOR. Procedure Date Date: 02/03/2021 Start: 08:33 AM Study Location: Portable Technical Quality: Poor visualization due to poor acoustical window. Indications:LVF. Patient Status: Routine Height: 65 inches Weight: 153 pounds BSA: 1.77 m^2 BMI: 25.46 kg/m^2  Conclusions  Summary  Very limited and technically difficult study. LV Function appears to be preserved. will need repeat at later time.  when more stable  Signature  ----------------------------------------------------------------  Electronically signed by July Menjivar MD(Interpreting  physician) on 02/03/2021 12:07 PM  ----------------------------------------------------------------    Xr Abdomen (kub) (single Ap View)    Result Date: 2/4/2021 EXAM:  CT ABDOMEN PELVIS W IV CONTRAST History: Abdominal distention. Abdominal pain. Technique: Multiple contiguous axial images were obtained of the abdomen and pelvis from an level of the lung bases through the ischial tuberosities with IV contrast. Multiplanar reformats were obtained. Delayed images were obtained. Comparison: None available Findings: Small right pleural effusion. Bibasilar subsegmental atelectasis. Subtle nodularity of the liver. A 1 cm hypodense lesion within the right lobe of the liver is seen on axial series 2 image 29 and an ill-defined area of hypodensity within the inferior right lobe of the liver measuring approximately 2 cm is seen on axial  series 2 image 38. The gallbladder is physiologically distended and contains multiple small densities within the dependent portion. No gallbladder wall thickening is identified. The stomach, spleen, pancreas, and adrenal glands appear within normal limits. Small hiatal hernia. There is thickening and nodularity of the omentum. There is a large amount of ascites demonstrating simple fluid density. The kidneys enhance uniformly. There is mild right-sided hydronephrosis however no radiopaque or urinary tract calculi identified. No left-sided urinary tract calculi or hydronephrosis. Urinary bladder is suboptimally distended. There is mild perivesicular inflammation. The uterus is surgically absent Abdominal aorta is nonaneurysmal  . No retroperitoneal or abdominal/pelvic lymphadenopathy. No small bowel obstruction. No overt colonic mass or pericolonic inflammation although the large amount of ascites obscures evaluation for inflammation. There is a large amount of stool within the rectosigmoid colon. The appendix is not definitively visualized. No pneumoperitoneum or portal venous gas. No acute or aggressive osseous abnormality. Degenerative changes of the spine. Thickening and nodularity of the omentum concerning for peritoneal metastasis or peritonitis. A large amount of ascites is present. Two ill-defined lesions within the right lobe of the liver measuring 1 cm and 2 cm respectively are nonspecific but most concerning for metastatic lesions. Mild perivesicular inflammation. Correlation with urinalysis recommended to exclude cystitis. Large amount stool within the rectosigmoid colon may represent constipation and/or fecal impaction. Subtle nodularity of the liver suggests cirrhosis. Mild right-sided hydronephrosis without radiopaque calculus. Debris within the gallbladder likely represents gallstones and/or gallbladder sludge. Small right pleural effusion. All CT scans at this facility use dose modulation, iterative reconstruction, and/or weight based dosing when appropriate to reduce radiation dose to as low as reasonably achievable. Us Abdomen Limited    Result Date: 2/3/2021  CLINICAL INDICATION:   Patient with new ascites and abdominal pain, concern for metastatic disease COMPARISON: CT dating February 1, 2021 DISCUSSION:     Transverse and longitudinal images of the right upper quadrant of the abdomen were obtained with color Doppler interrogation of the hepatic vessels. The liver is normal  in echogenicity and no evidence of focal masses seen in the right liver lobe. The hepatic vein, and portal veins are patent. There is normal direction of blood flow in the portal veins towards the liver. The main portal vein, right portal vein, left portal vein, and hepatic vein are patent with normal direction. The common hepatic artery is patent with velocity measuring 72 cm/s. The gallbladder is seen with echogenic gallstones. The gallbladder wall measures 2.4 mm. The portal veins, hepatic vein, and common hepatic artery are patent with normal direction of blood flow. No lesions are seen in the right liver lobe, though ultrasound is limited, and if there is clinical suspicion, MRI of the liver can be obtained. Us Dup Abd Pel Retro Scrot Complete    Result Date: 2/3/2021  EXAMINATION:  US PELVIS COMPLETE CLINICAL HISTORY: Ascites. COMPARISONS:  NONE AVAILABLE TECHNIQUE:  Transabdominal ultrasound of the pelvis. FINDINGS:  The uterus is surgically absent. Both left and right ovaries are surgically absent. There is free fluid within the pelvis. FREE FLUID WITHIN THE PELVIS. EXAMINATION:  US DUP ABD PEL RETRO SCROT COMPLETE CLINICAL HISTORY: Abnormal CT scan. COMPARISONS:  NONE AVAILABLE TECHNIQUE:  Transabdominal ultrasound of the right upper quadrant with both duplex color ultrasound and spectral Doppler ultrasound with interrogation of the hepatic and portal venous system was performed. FINDINGS:  The visualized portion of the right lobe of the liver shows no focal parenchymal abnormalities. The hypoechoic area seen at the inferior medial aspect of the right lobe of liver on CT is not appreciated. Within the field-of-view there are findings of cholelithiasis. There is ascites present. The hepatic veins and portal veins are patent. Normal direction seen within the portal veins and hepatic vein. IMPRESSION:  1. ASCITES IS PRESENT WITHIN THE FIELD-OF-VIEW. 2. CHOLELITHIASIS. 3. UNREMARKABLE SONOGRAPHIC EXAMINATION OF THE HEPATIC AND PORTAL VEIN. Xr Chest Abdomen Ng Placement    Result Date: 2/4/2021  EXAMINATION: LOW CHEST HIGH KUB CLINICAL HISTORY: NG tube placement COMPARISON : The right fourth 2021 FINDINGS: Interval placement of nasogastric tube. The tip is within the stomach. Within the field-of-view there is still prominent dilated loops of large bowel. .     INTERVAL PLACEMENT OF NASOGASTRIC TUBE AS DESCRIBED ABOVE    Us Guided Paracentesis There is still markedly pronounced more distal colon. Dilated small bowel loops, with persistent delay at level of proximal jejunum. Contrast medium visualized ileocecal valve at 4 hours. Following remains air-filled and dilated at that time. Findings suggest stenosis/partial obstruction at level of distal colon and at level of mid jejunum. Fl Barium Enema    Result Date: 2/8/2021  COMPARISON: February 4, 2021 Fluoroscopic time. 1.6 minutes 17 images were obtained. HISTORY:    Abdominal pain, possible obstruction PATIENT NAME: JOEY SALDANA: TECHNIQUE: FL BARIUM ENEMA FINDINGS: Air is seen within bowel. An NG tube is seen in place with tip in the area of the stomach. Dilated loops of bowel are again seen. The most dilated loop measures up to 11.7 cm. Barium was introduced into the rectum. When the barium went into the distal sigmoid colon, patient was  unable to tolerate the barium and asked for the procedure to be stop. The barium was drained from the colon. Incomplete barium enema. Patient unable to tolerate procedure. There is diffuse dilation of the bowel.     Electronically signed by Nathan Gomez MD on 2/10/2021 at 4:30 PM

## 2021-02-10 NOTE — PROGRESS NOTES
Palliative Care Progress Note  Patient: Andrew Valladares  Gender: female  YOB: 1950  Unit/Bed: IC16/IC16-01  Code Status: Full Code  Inpatient Treatment Team: Treatment Team: Attending Provider: Eluterio Merlin, MD; Consulting Physician: Aparna Hahn MD; Consulting Physician: Hamzah Oliver MD; Consulting Physician: Leodan Mitchell MD; Utilization Reviewer: Laura Montilla RN; Surgeon: Leodan Mitchell MD; Wound Care: Ian Moncada RN; Utilization Reviewer: Alana Jordan RN; Consulting Physician: Kaylyn Tolentino MD; : Andressa Valdes; Registered Nurse: Marlys Ramachandran, MANISHA; : Aissatou Vo [place]  Admit Date:  2/1/2021    Chief Complaint: Goals of Care    History of Presenting Illness:      Andrew Valladares is a 69 y.o. female on hospital day 9 with a history of Anxiety and panic attacks, she had been experiencing abdominal pain, constipation, anorexia, and weight loss for several weeks and was sent to ED by PCP for dehydration. Abdominal CT showed: Thickening and nodularity of the omentum concerning for peritoneal metastasis or peritonitis. A large amount of ascites is present.       Two ill-defined lesions within the right lobe of the liver measuring 1 cm and 2 cm respectively are nonspecific but most concerning for metastatic lesions.       Mild perivesicular inflammation.  Correlation with urinalysis recommended to exclude cystitis.       Large amount stool within the rectosigmoid colon may represent constipation and/or fecal impaction.       Subtle nodularity of the liver suggests cirrhosis.       Mild right-sided hydronephrosis without radiopaque calculus.       Debris within the gallbladder likely represents gallstones and/or gallbladder sludge.       Small right pleural effusion.         Cytology from 2/2/21 showed Atypical mesothelial cells, favor reactive, macrophages, RBCs and WBCs present.  Elevated CA-125 She developed a bowel obstruction at the level of distal colon most likely from mets, NG tube in place, she is not in any pain, no nausea, but abdomen is quite distended and firm. She is having surgery today for a diverting colostomy and placement of G tube for nutrition. She will be evaluated for possible chemotx after recovery depend ending on performance status per oncology. 2/9/21           Recovering in ICU after exploratory laparotomy with subtotal colectomy and ileostomy. Estimated blood loss 200 cc,  hypotensive postop, she is currently on Levophed 18 mics per minute, Negative chest pain, no SOB, no abdominal pain,nausea, vomiting,no bowel movement in the ileostomy bag, urine output 625 cc, drainage output 790 cc, serosanguineous, no fever overnight, she is saturating 100% on 2 L nasal cannula. Demeanor calm and relaxed,  at bedside. 2/10/21     Demeanor calm and relaxed, pain well controlled with current POC, No SOB, slept well, no panic attacks, off pressors, BP stable. Passing flatus, No BM yet.  at bedside. Review of Systems:       Review of Systems   Constitutional: Negative for activity change, appetite change, chills, diaphoresis, fatigue, fever and unexpected weight change. HENT: Negative for drooling, hearing loss, mouth sores, sore throat, trouble swallowing and voice change. Eyes: Negative for discharge and visual disturbance. Respiratory: Negative for apnea, cough, choking, chest tightness, shortness of breath, wheezing and stridor. Cardiovascular: Negative for chest pain, palpitations and leg swelling. Gastrointestinal: Positive for abdominal distention and constipation. Negative for abdominal pain, anal bleeding, blood in stool, diarrhea, nausea, rectal pain and vomiting. Genitourinary: Negative for difficulty urinating, dysuria, enuresis, frequency and hematuria. Musculoskeletal: Negative for arthralgias, back pain, gait problem, joint swelling and myalgias. Skin: Positive for pallor. Negative for color change, rash and wound. Allergic/Immunologic: Negative for food allergies and immunocompromised state. Neurological: Negative for dizziness, tremors, seizures, syncope, facial asymmetry, speech difficulty, weakness, light-headedness, numbness and headaches. Hematological: Negative for adenopathy. Does not bruise/bleed easily. Psychiatric/Behavioral: Negative for agitation, behavioral problems, confusion, decreased concentration, dysphoric mood, hallucinations, self-injury, sleep disturbance and suicidal ideas. The patient is not nervous/anxious and is not hyperactive. Physical Examination:       BP (!) 103/49   Pulse 93   Temp 97.8 °F (36.6 °C) (Oral)   Resp 17   Ht 5' 5\" (1.651 m)   Wt 150 lb (68 kg)   SpO2 98%   BMI 24.96 kg/m²    Physical Exam  Constitutional:       General: She is not in acute distress. Appearance: She is cachectic. She is ill-appearing. She is not diaphoretic. Comments: NG Tube   HENT:      Head: Normocephalic and atraumatic. Right Ear: External ear normal.      Left Ear: External ear normal.      Nose: Nose normal.      Mouth/Throat:      Pharynx: No oropharyngeal exudate. Eyes:      General: No scleral icterus. Right eye: No discharge. Left eye: No discharge. Conjunctiva/sclera: Conjunctivae normal.      Pupils: Pupils are equal, round, and reactive to light. Neck:      Musculoskeletal: Normal range of motion and neck supple. Thyroid: No thyromegaly. Vascular: No JVD. Trachea: No tracheal deviation. Cardiovascular:      Rate and Rhythm: Normal rate and regular rhythm. Heart sounds: Normal heart sounds. Pulmonary:      Effort: Pulmonary effort is normal. No respiratory distress. Breath sounds: Normal breath sounds. No stridor. No wheezing or rales.    Chest: Chest wall: No tenderness. Abdominal:      General: Bowel sounds are absent. There is distension. Palpations: Abdomen is rigid. There is no mass. Tenderness: There is no abdominal tenderness. There is no guarding or rebound. Musculoskeletal: Normal range of motion. General: No tenderness or deformity. Lymphadenopathy:      Cervical: No cervical adenopathy. Skin:     General: Skin is warm and dry. Findings: No erythema or rash. Neurological:      Mental Status: She is alert and oriented to person, place, and time. Psychiatric:         Behavior: Behavior normal.         Thought Content: Thought content normal.         Allergies:       Allergies   Allergen Reactions    Aspirin Other (See Comments)     GI issue       Medications:      Current Facility-Administered Medications   Medication Dose Route Frequency Provider Last Rate Last Admin    lactated ringers bolus  500 mL Intravenous Once Laureen Paez MD        magnesium sulfate 2000 mg in 50 mL IVPB premix  2,000 mg Intravenous Once Laureen Paez MD        lidocaine 2 % injection 5 mL  5 mL Intradermal Once Laureen Paez MD        sodium chloride flush 0.9 % injection 10 mL  10 mL Intravenous 2 times per day Laureen Paez MD   10 mL at 02/10/21 0754    sodium chloride flush 0.9 % injection 10 mL  10 mL Intravenous PRN Laureen Paez MD   10 mL at 02/09/21 1929    0.9 % sodium chloride infusion  250 mL Intravenous Once Laureen Paez MD        piperacillin-tazobactam (ZOSYN) 3,375 mg in dextrose 5 % 50 mL IVPB extended infusion (mini-bag)  3,375 mg Intravenous Agus Garcia MD   Stopped at 02/10/21 0754    lactated ringers infusion   Intravenous Continuous Laureen Paez  mL/hr at 02/10/21 0741 New Bag at 02/10/21 0741    naloxone (NARCAN) injection 0.4 mg  0.4 mg Intravenous PRN Rose Marie Serrano MD  HYDROmorphone (DILAUDID) 0.2 mg/mL PCA   Intravenous Continuous Maninder Snyder MD   10 mg at 02/08/21 1708    glucose (GLUTOSE) 40 % oral gel 15 g  15 g Oral PRN Maninder Snyder MD        dextrose 50 % IV solution  12.5 g Intravenous PRN Maninder Snyder MD        glucagon (rDNA) injection 1 mg  1 mg Intramuscular PRN Maninder Snyder MD        dextrose 5 % solution  100 mL/hr Intravenous PRN Maninder Snyder MD        [Held by provider] insulin glargine (LANTUS) injection vial 10 Units  10 Units Subcutaneous Nightly Maninder Snyder MD   Stopped at 02/09/21 2132    insulin lispro (HUMALOG) injection vial 0-6 Units  0-6 Units Subcutaneous Q6H Maninder Snyder MD   1 Units at 02/07/21 1834    potassium chloride 10 mEq/100 mL IVPB (Peripheral Line)  10 mEq Intravenous PRN Maninder Snyder  mL/hr at 02/08/21 1208 10 mEq at 02/08/21 1418    magnesium sulfate 1000 mg in dextrose 5% 100 mL IVPB  1,000 mg Intravenous PRN Maninder Snyder MD        sodium phosphate 10.89 mmol in dextrose 5 % 250 mL IVPB  0.16 mmol/kg Intravenous PRN Maninder Snyder MD        Or   [facility] sodium phosphate 21.75 mmol in dextrose 5 % 250 mL IVPB  0.32 mmol/kg Intravenous PRN Maninder Snyder MD        benzocaine (HURRICAINE) 20 % oral spray   Mouth/Throat 4x Daily PRN Maninder Snyder MD   Given at 02/07/21 1205    ketorolac (TORADOL) injection 15 mg  15 mg Intravenous Q6H PRN Maninder Snyder MD   15 mg at 02/08/21 1105    pantoprazole (PROTONIX) tablet 40 mg  40 mg Oral QAM AC Maninder Snyder MD        [Held by provider] spironolactone (ALDACTONE) tablet 50 mg  50 mg Oral Daily Maninder Snyder MD        [Held by provider] furosemide (LASIX) tablet 20 mg  20 mg Oral Daily Maninder Snyder MD        sodium chloride flush 0.9 % injection 10 mL  10 mL Intravenous 2 times per day Maninder Snyder MD   10 mL at 02/08/21 4318  sodium chloride flush 0.9 % injection 10 mL  10 mL Intravenous PRN Michele Ochoa MD        enoxaparin (LOVENOX) injection 40 mg  40 mg Subcutaneous Daily Michele Ochoa MD   40 mg at 02/10/21 0741    promethazine (PHENERGAN) tablet 12.5 mg  12.5 mg Oral Q6H PRN Michele Ochoa MD        Or    ondansetron [facility]) injection 4 mg  4 mg Intravenous Q6H PRN Michele Ochoa MD   4 mg at 02/08/21 1105    acetaminophen (TYLENOL) tablet 650 mg  650 mg Oral Q6H PRN Michele Ochoa MD   650 mg at 02/06/21 0940    Or    acetaminophen (TYLENOL) suppository 650 mg  650 mg Rectal Q6H PRN Michele Ochoa MD        senna (SENOKOT) tablet 17.2 mg  2 tablet Oral Nightly Michele Ochoa MD   17.2 mg at 02/02/21 2138       History: PMHx:  Past Medical History:   Diagnosis Date    Anxiety     panic attacks    Fibroids 1998    has complete hysterctomy    Kidney stone     14 years ago       PSHx:  Past Surgical History:   Procedure Laterality Date    HYSTERECTOMY, TOTAL ABDOMINAL      LAPAROTOMY N/A 2/8/2021    EXPLORATORY LAPAROTOMY, SUBTOTAL COLECTOMY,  END ILLEOSTOMY, INSERTION GASTROSTOMY TUBE. performed by Michele Ochoa MD at [address] [place] [address] 02/02/2021    4,440 ml removed by Dr. Zapata [place] 2/5/2021    SIGMOIDOSCOPY BIOPSY FLEXIBLE performed by Yousif Ocampo MD at [facility] Hx:  Social History     Socioeconomic History    Marital status:      Spouse name: None    Number of children: None    Years of education: None    Highest education level: None   Occupational History    None   Social Needs    Financial resource strain: None    Food insecurity     Worry: Never true     Inability: Never true    Transportation needs     Medical: No     Non-medical: No   Tobacco Use    Smoking status: Never Smoker    Smokeless tobacco: Never Used   Substance and Sexual Activity    Alcohol use:  Yes

## 2021-02-10 NOTE — PROGRESS NOTES
Nutrition Related Findings:  s/p bowel resection, colostomy, PEG tube placed (2/8). Appears frail and weak,  current weight relfects > 7% loss < 1 month, IVF: LR @ 125 ml/hr via peripheral line, BM 2/4, abdomen noted to be soft;rounded. Labs reviewed-elevated BUN/Cr, Mg-1.3, phos-1.9 (2/7), Gluc- 80. PMH: Anxiety, renal stones      Wounds:  Surgical Incision       Current Nutrition Therapies:    Diet NPO Time Specified Exceptions are: Ice Chips  Current Tube Feeding (TF) Orders:  · Feeding Route: PEG  · Formula: Standard w/Fiber  · Schedule: Continuous @ 20 ml/hr x 24 hrs  · Water Flushes: 140 ml every 4 hrs  · Current TF & Flush Orders Provides: Not yet infusing  · Goal TF & Flush Orders Provides: 1728 kcal, 80 gm protein, ~1720 ml free water (@ 60 ml/hr)    Anthropometric Measures:  · Height: 5' 5\" (165.1 cm)  · Current Body Weight: 150 lb (68 kg)(2/5)   · Admission Body Weight: 160 lb (72.6 kg)(stated)    · Usual Body Weight: 165 lb (74.8 kg)(1/7/21)     · Ideal Body Weight: 125 lbs; % Ideal Body Weight  > 100%   · BMI: 25  · BMI Categories: Normal Weight (BMI 18.5-24. 9)       Nutrition Diagnosis:   · Inadequate oral intake related to altered GI function as evidenced by NPO or clear liquid status due to medical condition    Nutrition Interventions:   Food and/or Nutrient Delivery:  Continue NPO, Start Tube Feeding(Replace Mg and phosphorus prior to starting TF. Start Standard with fiber formula (Jevity 1.2) at 20 ml/hr x 24 hrs. 140 ml water flush every 4 hrs. Recheck electrolytes prior to advancing to goal rate.   Advance rate as tolerated to goal of 60 ml/hr)  Nutrition Education/Counseling:  No recommendation at this time   Coordination of Nutrition Care:  Continue to monitor while inpatient    Goals:  Initiation and tolerance to TF       Nutrition Monitoring and Evaluation:   Behavioral-Environmental Outcomes:      Food/Nutrient Intake Outcomes:  Enteral Nutrition Intake/Tolerance Physical Signs/Symptoms Outcomes:  Biochemical Data, GI Status, Weight, Nutrition Focused Physical Findings     Discharge Planning:    Enteral Nutrition     Electronically signed by Rachelle Baker RD, DOYEL on 2/10/21 at 4:17 PM EST

## 2021-02-10 NOTE — PROGRESS NOTES
Pulmonary & Critical Care Medicine ICU Progress Note  Chief complaint : Hypertension    Subjunctive/24 hour events :   Patient seen and examined during multidisciplinary rounds with RN, charge nurse, RT, pharmacy, dietitian, and social service. Patient feels good, denies pain, no chest pain, no shortness of breath, she slept well, she is off pressors, no fever, blood pressure remained relatively stable overnight did dip down while asleep however improved this morning, urine output 800 cc, passing gas, no bowel movement in the bag yet, no bleed      Social History     Tobacco Use    Smoking status: Never Smoker    Smokeless tobacco: Never Used   Substance Use Topics    Alcohol use: Yes     Comment: occasional- socially         Problem Relation Age of Onset    Breast Cancer Mother     Heart Disease Father     Atrial Fibrillation Brother        No results for input(s): PHART, HZU2QLW, PO2ART in the last 72 hours. MV Settings:     / / /            IV:   sodium chloride      lactated ringers 125 mL/hr at 02/10/21 0741    HYDROmorphone      norepinephrine Stopped (02/09/21 1450)    dextrose         Vitals:  BP (!) 86/39   Pulse 78   Temp 97.8 °F (36.6 °C) (Oral)   Resp 14   Ht 5' 5\" (1.651 m)   Wt 150 lb (68 kg)   SpO2 100%   BMI 24.96 kg/m²    Tmax:        Intake/Output Summary (Last 24 hours) at 2/10/2021 0850  Last data filed at 2/10/2021 0542  Gross per 24 hour   Intake 3727.19 ml   Output 2025 ml   Net 1702.19 ml       EXAM:  General: alert, cooperative, no distress  Head: normocephalic, atraumatic  Eyes:No gross abnormalities. ENT:  MMM no lesions  Neck:  supple and no masses  Chest : clear to auscultation bilaterally- no wheezes, rales or rhonchi, normal air movement, no respiratory distress  Heart[de-identified] Heart sounds are normal.  Regular rate and rhythm without murmur, gallop or rub. ABD:  symmetric, soft, no apparent tenderness, surgical dressing on and clean, ileostomy bag, YULISA drain, and feeding tube  Musculoskeletal : no cyanosis, no clubbing and no edema  Neuro:  Grossly normal  Skin: No rashes or nodules noted. Lymph node:  no cervical nodes  Urology: Yes Aguilar   Psychiatric: appropriate    Medications:  Scheduled Meds:   lidocaine  5 mL Intradermal Once    sodium chloride flush  10 mL Intravenous 2 times per day    piperacillin-tazobactam  3,375 mg Intravenous Q8H    [Held by provider] insulin glargine  10 Units Subcutaneous Nightly    insulin lispro  0-6 Units Subcutaneous Q6H    pantoprazole  40 mg Oral QAM AC    spironolactone  50 mg Oral Daily    [Held by provider] furosemide  20 mg Oral Daily    sodium chloride flush  10 mL Intravenous 2 times per day    enoxaparin  40 mg Subcutaneous Daily    senna  2 tablet Oral Nightly       PRN Meds:  sodium chloride flush, naloxone, glucose, dextrose, glucagon (rDNA), dextrose, potassium chloride, magnesium sulfate, sodium phosphate IVPB **OR** sodium phosphate IVPB, benzocaine, ketorolac, sodium chloride flush, promethazine **OR** ondansetron, acetaminophen **OR** acetaminophen    Results: reviewed by me   CBC:   Recent Labs     02/08/21  0616 02/09/21  0528 02/10/21  0508   WBC 15.1* 10.4 14.4*   HGB 13.9 13.6 9.2*   HCT 41.8 43.9 27.9*   MCV 88.6 93.1 86.6    230 222     BMP:   Recent Labs     02/08/21  0616 02/09/21  0528 02/10/21  0508   * 131* 135   K 3.4 4.0 3.5   CL 96 109* 108*   CO2 18* 10* 18*   BUN 63* 59* 42*   CREATININE 1.85* 1.58* 1.26*     LIVER PROFILE: No results for input(s): AST, ALT, LIPASE, BILIDIR, BILITOT, ALKPHOS in the last 72 hours. Invalid input(s): AMYLASE,  ALB  PT/INR: No results for input(s): PROTIME, INR in the last 72 hours. APTT: No results for input(s): APTT in the last 72 hours. UA:No results for input(s): NITRITE, COLORU, PHUR, LABCAST, WBCUA, RBCUA, MUCUS, TRICHOMONAS, YEAST, BACTERIA, CLARITYU, SPECGRAV, LEUKOCYTESUR, UROBILINOGEN, BILIRUBINUR, BLOODU, GLUCOSEU, AMORPHOUS in the last 72 hours. Invalid input(s): Rose Tangier    Cultures:  Negative so far  Films:  CXR reviewed by me and it showed no infiltrate      Assessment: This is a critically ill patient at risk of deterioration / death , needing close ICU monitoring and intervention due to below noted problems   · Hypotension, secondary to hypovolemia, responded to fluid, she is on empiric antibiotics  · Small bowel obstruction status post subtotal colectomy  · Peritoneal carcinomatosis  · Acute kidney injury, improving  Recommendation  · LR 1000 cc bolus today  · Continue maintenance fluid  · Hold diuretics  · Continue Zosyn for now, repeat procalcitonin tomorrow if negative will DC antibiotics  · Continue PPI  · Up in chair  · Encourage activity  · Replace magnesium  · Monitor blood sugar target 140180  · Monitor renal function and urine output    I spent 32 min with this patient, greater the 50% of this time was spent in counseling and/or coordinating of care.             Electronically signed by Brii Carr MD,  [facility] ,on 2/10/2021 at 8:50 AM

## 2021-02-10 NOTE — PROGRESS NOTES
[facility] Occupational Therapy Department  Change in Status Communication Form      To the referring physician:    Due to an acute change in this patient's medical status, new Occupational Therapy Eval and Treatment orders are required. Pt. has transferred to ICU. Please reorder when pt. is medically stable to resume OOB activity, as appropriate. We thank you for your referral.     Kaycee Reilly,   [facility] EMERGENCY [facility] AT [facility] OT Department.      Electronically signed by DONNY Rodriguez on 2/10/21 at 8:19 AM EST

## 2021-02-10 NOTE — FLOWSHEET NOTE
Pt states she slept well during the night. YULISA intact  275 cc output of serosanguinous  Gastrostomy tube 150cc green bile Used PCA pump x1 dose states she is not having much pain. Instructed on Incentive Spirometry. Vitals stable.

## 2021-02-10 NOTE — PROGRESS NOTES
Hospitalist Progress Note      PCP: Sivlia Castro MD    Date of Admission: 2/1/2021    Chief Complaint:    Chief Complaint   Patient presents with    Fatigue     sent by dr Lesley Curtis for dehydration work up , patient has been vomiting and not keeping anything down and is very fatigued     Subjective:  Patient resting in bed, no cp, sob. Fatigued postop. Medications:  Reviewed    Infusion Medications    sodium chloride      lactated ringers 125 mL/hr at 02/10/21 0036    HYDROmorphone      norepinephrine Stopped (02/09/21 1450)    dextrose       Scheduled Medications    lidocaine  5 mL Intradermal Once    sodium chloride flush  10 mL Intravenous 2 times per day    piperacillin-tazobactam  3,375 mg Intravenous Q8H    [Held by provider] insulin glargine  10 Units Subcutaneous Nightly    insulin lispro  0-6 Units Subcutaneous Q6H    pantoprazole  40 mg Oral QAM AC    spironolactone  50 mg Oral Daily    [Held by provider] furosemide  20 mg Oral Daily    sodium chloride flush  10 mL Intravenous 2 times per day    enoxaparin  40 mg Subcutaneous Daily    senna  2 tablet Oral Nightly     PRN Meds: sodium chloride flush, naloxone, glucose, dextrose, glucagon (rDNA), dextrose, potassium chloride, magnesium sulfate, sodium phosphate IVPB **OR** sodium phosphate IVPB, benzocaine, ketorolac, sodium chloride flush, promethazine **OR** ondansetron, acetaminophen **OR** acetaminophen      Intake/Output Summary (Last 24 hours) at 2/10/2021 0157  Last data filed at 2/9/2021 2114  Gross per 24 hour   Intake 5118.19 ml   Output 1625 ml   Net 3493.19 ml     Exam:    BP (!) 95/45   Pulse 84   Temp 97.7 °F (36.5 °C) (Axillary)   Resp 16   Ht 5' 5\" (1.651 m)   Wt 150 lb (68 kg)   SpO2 100%   BMI 24.96 kg/m²     General appearance: NAD  HEENT: Conjunctivae/corneas clear. Neck:  Trachea midline. Respiratory:  Normal respiratory effort.  Clear to auscultation  Cardiovascular: Regular rate and rhythm Abdomen: Well wrapped, no drainage  Musculoskeletal: No clubbing, cyanosis or edema bilaterally. Neuro: Non Focal  Capillary Refill: Brisk,< 3 seconds   Peripheral Pulses: +2 palpable, equal bilaterally     Labs:   Recent Labs     02/07/21  0706 02/08/21  0616 02/09/21  0528   WBC 13.5* 15.1* 10.4   HGB 14.2 13.9 13.6   HCT 42.9 41.8 43.9    297 230     Recent Labs     02/07/21  0706 02/08/21  0616 02/09/21  0528   * 127* 131*   K 3.3* 3.4 4.0   CL 99 96 109*   CO2 21 18* 10*   BUN 34* 63* 59*   CREATININE 1.27* 1.85* 1.58*   CALCIUM 8.3* 8.3* 7.0*   PHOS 1.9*  --   --      No results for input(s): AST, ALT, BILIDIR, BILITOT, ALKPHOS in the last 72 hours. No results for input(s): INR in the last 72 hours. No results for input(s): Cata Gaster in the last 72 hours. Urinalysis:      Lab Results   Component Value Date    NITRU Negative 02/01/2021    BLOODU Negative 02/01/2021    SPECGRAV 1.065 02/01/2021    GLUCOSEU Negative 02/01/2021     Radiology:  FL BARIUM ENEMA   Final Result   Incomplete barium enema. Patient unable to tolerate procedure. There is diffuse dilation of the bowel. FL SMALL BOWEL FOLLOW THROUGH ONLY   Final Result      Dilated small bowel loops, with persistent delay at level of proximal jejunum. Contrast medium visualized ileocecal valve at 4 hours. Following remains air-filled and dilated at that time. Findings suggest stenosis/partial obstruction at level of distal    colon and at level of mid jejunum. XR CHEST ABDOMEN NG PLACEMENT   Final Result   INTERVAL PLACEMENT OF NASOGASTRIC TUBE AS DESCRIBED ABOVE      XR ABDOMEN (KUB) (SINGLE AP VIEW)   Final Result   DISTENDED DILATED LOOPS OF LARGE BOWEL WITH A TRANSITION POINT IN THE REGION OF THE DESCENDING COLON, SIGMOID. FINDINGS COULD SUGGEST THAT OF POSSIBLE HIGH-GRADE OBSTRUCTION. FURTHER EVALUATION IS WARRANTED. US PELVIS COMPLETE   Preliminary Result   FREE FLUID WITHIN THE PELVIS. EXAMINATION:  US DUP ABD PEL RETRO SCROT COMPLETE      CLINICAL HISTORY: Abnormal CT scan. COMPARISONS:  NONE AVAILABLE      TECHNIQUE:  Transabdominal ultrasound of the right upper quadrant with both duplex color ultrasound and spectral Doppler ultrasound with interrogation of the hepatic and portal venous system was performed. FINDINGS:     The visualized portion of the right lobe of the liver shows no focal parenchymal abnormalities. The hypoechoic area seen at the inferior medial aspect of the right lobe of liver on CT is not appreciated. Within the field-of-view there are findings of cholelithiasis. There is ascites present. The hepatic veins and portal veins are patent. Normal direction seen within the portal veins and hepatic vein. IMPRESSION:        1. ASCITES IS PRESENT WITHIN THE FIELD-OF-VIEW. 2. CHOLELITHIASIS. 3. UNREMARKABLE SONOGRAPHIC EXAMINATION OF THE HEPATIC AND PORTAL VEIN. US DUP ABD PEL RETRO SCROT COMPLETE   Preliminary Result   FREE FLUID WITHIN THE PELVIS. EXAMINATION:  US DUP ABD PEL RETRO SCROT COMPLETE      CLINICAL HISTORY: Abnormal CT scan. COMPARISONS:  NONE AVAILABLE      TECHNIQUE:  Transabdominal ultrasound of the right upper quadrant with both duplex color ultrasound and spectral Doppler ultrasound with interrogation of the hepatic and portal venous system was performed. FINDINGS:     The visualized portion of the right lobe of the liver shows no focal parenchymal abnormalities. The hypoechoic area seen at the inferior medial aspect of the right lobe of liver on CT is not appreciated. Within the field-of-view there are findings of cholelithiasis. There is ascites present. The hepatic veins and portal veins are patent. Normal direction seen within the portal veins and hepatic vein. IMPRESSION:        1. ASCITES IS PRESENT WITHIN THE FIELD-OF-VIEW. 2. CHOLELITHIASIS. 3. UNREMARKABLE SONOGRAPHIC EXAMINATION OF THE HEPATIC AND PORTAL VEIN. US GUIDED PARACENTESIS   Final Result   1. Status post technically successful ultrasound-guided paracentesis. Yelitza Zee is a Female of 69 years age, referred for Ultrasound Guided Paracentesis. PROCEDURE: Survey of the abdomen showed large amount of ascites fluid. After obtaining informed consent, the patient was positioned supine on the sonography table. Using ultrasound, the skin over the left hemiabdomen was locally anesthetized with 1% lidocaine. Following that, a Yueh needle was advanced into the fluid    pocket using ultrasound visualization. 4440cc, of clear yellow fluid were aspirated and sent for cytology, and pathology. The needle was removed, and hemostasis was obtained with pressure. A Band-Aid was placed. Post procedure images did not demonstrate hemorrhage at the target site. The patient tolerated the procedure well. The patient left the department in good condition. A radiology nurse was in presence monitoring vital signs, assisting throughout the procedure. US ABDOMEN LIMITED   Final Result   The portal veins, hepatic vein, and common hepatic artery are patent with normal direction of blood flow. No lesions are seen in the right liver lobe, though ultrasound is limited, and if there is clinical suspicion, MRI of the liver can be    obtained. CT ABDOMEN PELVIS W IV CONTRAST Additional Contrast? None   Final Result      Thickening and nodularity of the omentum concerning for peritoneal metastasis or peritonitis. A large amount of ascites is present. Two ill-defined lesions within the right lobe of the liver measuring 1 cm and 2 cm respectively are nonspecific but most concerning for metastatic lesions. Mild perivesicular inflammation. Correlation with urinalysis recommended to exclude cystitis. Large amount stool within the rectosigmoid colon may represent constipation and/or fecal impaction. Subtle nodularity of the liver suggests cirrhosis. Mild right-sided hydronephrosis without radiopaque calculus. Debris within the gallbladder likely represents gallstones and/or gallbladder sludge. Small right pleural effusion. All CT scans at this facility use dose modulation, iterative reconstruction, and/or weight based dosing when appropriate to reduce radiation dose to as low as reasonably achievable. MRI ABDOMEN W WO CONTRAST MRCP    (Results Pending)   IR PICC WO SQ PORT/PUMP > 5 YEARS    (Results Pending)     Assessment/Plan:    1. 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 Possibly pancreatic vs cholangiocarcinoma based on CA 19-9; plan for MRCP when more stable; other sources being worked up by oncology  2. High grade obstruction of descending colon: Will need colectomy; patient is deciding; if she is agreeable then plan for OR tomorrow at 3pm  1. 2/8 - s/p colectomy with diverting ostomy, moved to icu for extensive procedure post op care. 2. 2/9 - pod 1, sips, cont post op care per surgery; ICU as required levophed for post op hypotension  3. Hyponatremia/BALA  1. 2/9 - both improved with iv fluids  4. Right sided pleural effusion:  Likely from the ascites; ascites drained  5. Functional Status: Fall precautions. Up with assistance. PT OT  6. Diet: NPO  7. DVT ppx: Lovenox SCDs  8. Disposition: Dependent on hospital course. Will discharge once medically stable. ÁNGELA on board for discharge planning.      Active Hospital Problems    Diagnosis Date Noted    Fatigue [R53.83]     Malignant ascites [R18.0] 02/01/2021 Additional work up or/and treatment plan may be added today or then after based on clinical progression. I am managing a portion of pt care. Some medical issues are handled by other specialists. Additional work up and treatment should be done in out pt setting by pt PCP and other out pt providers. In addition to examining and evaluating pt, I spent additional time explaining care, normal and abnormal findings, and treatment plan. All of pt questions were answered. Counseling, diet and education were  provided. Case will be discussed with nursing staff when appropriate. Family will be updated if and when appropriate.       Diet: Diet NPO Time Specified Exceptions are: Ice Chips    Code Status: Full Code    PT/OT Eval     Electronically signed by Mychal Gracia MD on 2/10/2021 at 1:57 AM

## 2021-02-10 NOTE — PROGRESS NOTES
Wound Ostomy Continence Nurse  Ostomy Referral Follow-up Progress Note      NAME:  Davis Mclean  MEDICAL RECORD NUMBER:  [id number]  AGE: 69 y.o. GENDER:  female  :  1950  TODAY'S DATE:  2/10/2021    Subjective:    Davis Mclean is a 69 y.o. female referred by:   [x] Physician  [] Nursing  [] Other:     ileostomy and New    Objective    BP (!) 120/58   Pulse 86   Temp 97.5 °F (36.4 °C) (Oral)   Resp 16   Ht 5' 5\" (1.651 m)   Wt 150 lb (68 kg)   SpO2 100%   BMI 24.96 kg/m²     Emmanuel Risk Score Emmanuel Scale Score: 20    Assessment   Surgeon - Dr. Moreno Faulkner       Ileostomy Ileostomy RLQ (Active)   Stomal Appliance Intact;2 piece 02/10/21 0730   Flange Size (inches) 3 Inches 21 1330   Stoma  Assessment Protrudes; Moist;Red 02/10/21 0730   Mucocutaneous Junction Intact 02/10/21 0730   Stool Color Green 21 1330   Stool Appearance Watery 21 1330   Stool Amount Small 21 1330   Output (mL) 0 ml 02/10/21 1300   Number of days: 1     No change in stoma appearance from yesterday. Ileostomy with very little function, scant green effluent in the appliance. Patient transferred out of ICU to med/surg floor today, states she is \"feeling better. \" Patient requesting ice water, this [id number]  Nurse provided ice chips and explained advancing diet can be discussed with Dr. Gaurav Pitt when he rounds. General Ostomy education provided, but plan for formal lesson tomorrow morning with patient and patient's  at the bedside.       Intake/Output Summary (Last 24 hours) at 2/10/2021 1522  Last data filed at 2/10/2021 1511  Gross per 24 hour   Intake 4302.19 ml   Output 2600 ml   Net 1702.19 ml       Plan:   Plan for Ostomy Care: See above      Ostomy Plan of Care  [] Supplies/Instructions left in room  [] Patient using home supplies  [] Brand/supplies at bedside - [employer]    Current Diet: Diet NPO Time Specified Exceptions are: [employer] Dietician consult:  Yes    Discharge Plan:  Placement for patient upon discharge: Undetermined     Outpatient visit plan No  Supplies given Yes   Samples requested N/A    Referrals:  []   [] [facility]  [] Supplies  [] Other      Patient/Caregiver Teaching:  Written Instructions given to patient/family: Patient  Teaching provided:  [] Reviewed GI and A&P        [] Supplies  [x] Pouch emptying      [] Manipulate closure  [x] Routine Care         [] Comment  [] Pouch maintenance           Level of patient/caregiver understanding able to:  [] Indicates understanding       [] Needs reinforcement  [] Unsuccessful      [x] Verbal Understanding  [] Demonstrated understanding       [] No evidence of learning  [] Refused teaching         [] N/A    Electronically signed by ANDREW Winston, RN, Ana Finn on 2/10/2021 at 3:26 PM

## 2021-02-10 NOTE — CARE COORDINATION
Team ICU rounds done in room and spouse at bedside. Dr. Yeni Soler offered to answer any questions. Pt needs PT/OT ordered to help determine dc plan. We will ask for order. Pt still on Dilaudid PCA but per nursing she isn't using it often.

## 2021-02-10 NOTE — PROGRESS NOTES
In bed. Dr. Namrata Morrow examining patient and ordered fluid bolus. Dr. Omayra au served about pending MRI from last week; ordered to d/c it. G-TUBE to gravity drainage; draining green bile. YULISA drain serosang, not as much as yesterday, dressing c/d/i. Scant amount of green drainage from ileostomy. Denies pain at time. Afebrile. 0930 Dr. Omayra au served about incentive spirometry order, changing IV dialaudid to PO pain management as patient has only used it once, as well as PT OT.  1230 Dr. Warner Carp here and said patient can start tube feedings. 1325 report called to Jorge on [facility].  1450 patient transferred off unit in no distress,  notified of room change, personal belonging , phone , 2 vases and flowers sent.

## 2021-02-10 NOTE — PLAN OF CARE
Problem: Falls - Risk of:  Goal: Will remain free from falls  Description: Will remain free from falls  Outcome: Ongoing  Goal: Absence of physical injury  Description: Absence of physical injury  Outcome: Ongoing     Problem: Nutrition  Goal: Optimal nutrition therapy  Outcome: Ongoing  Goal: Understanding of nutritional guidelines  Outcome: Ongoing     Problem: Pain:  Goal: Pain level will decrease  Description: Pain level will decrease  Outcome: Ongoing  Goal: Control of acute pain  Description: Control of acute pain  Outcome: Ongoing  Goal: Control of chronic pain  Description: Control of chronic pain  Outcome: Ongoing

## 2021-02-11 LAB
ANION GAP SERPL CALCULATED.3IONS-SCNC: 10 MEQ/L (ref 9–15)
BASOPHILS ABSOLUTE: 0 K/UL (ref 0–0.2)
BASOPHILS RELATIVE PERCENT: 0.2 %
BUN BLDV-MCNC: 29 MG/DL (ref 8–23)
CALCIUM SERPL-MCNC: 7.2 MG/DL (ref 8.5–9.9)
CHLORIDE BLD-SCNC: 110 MEQ/L (ref 95–107)
CO2: 18 MEQ/L (ref 20–31)
CREAT SERPL-MCNC: 0.94 MG/DL (ref 0.5–0.9)
EOSINOPHILS ABSOLUTE: 0.1 K/UL (ref 0–0.7)
EOSINOPHILS RELATIVE PERCENT: 0.4 %
GFR AFRICAN AMERICAN: >60
GFR NON-AFRICAN AMERICAN: 58.6
GLUCOSE BLD-MCNC: 103 MG/DL (ref 60–115)
GLUCOSE BLD-MCNC: 105 MG/DL (ref 60–115)
GLUCOSE BLD-MCNC: 108 MG/DL (ref 60–115)
GLUCOSE BLD-MCNC: 116 MG/DL (ref 70–99)
GLUCOSE BLD-MCNC: 89 MG/DL (ref 60–115)
HCT VFR BLD CALC: 28.2 % (ref 37–47)
HEMOGLOBIN: 9.1 G/DL (ref 12–16)
LYMPHOCYTES ABSOLUTE: 0.9 K/UL (ref 1–4.8)
LYMPHOCYTES RELATIVE PERCENT: 4.5 %
MAGNESIUM: 2 MG/DL (ref 1.7–2.4)
MCH RBC QN AUTO: 28.2 PG (ref 27–31.3)
MCHC RBC AUTO-ENTMCNC: 32.4 % (ref 33–37)
MCV RBC AUTO: 87 FL (ref 82–100)
MONOCYTES ABSOLUTE: 0.8 K/UL (ref 0.2–0.8)
MONOCYTES RELATIVE PERCENT: 4.3 %
NEUTROPHILS ABSOLUTE: 17.5 K/UL (ref 1.4–6.5)
NEUTROPHILS RELATIVE PERCENT: 90.6 %
PDW BLD-RTO: 13.8 % (ref 11.5–14.5)
PERFORMED ON: NORMAL
PLATELET # BLD: 246 K/UL (ref 130–400)
POTASSIUM REFLEX MAGNESIUM: 3.1 MEQ/L (ref 3.4–4.9)
PROCALCITONIN: 7.74 NG/ML (ref 0–0.15)
RBC # BLD: 3.24 M/UL (ref 4.2–5.4)
SODIUM BLD-SCNC: 138 MEQ/L (ref 135–144)
URINE CULTURE, ROUTINE: NORMAL
WBC # BLD: 19.3 K/UL (ref 4.8–10.8)

## 2021-02-11 PROCEDURE — 99215 OFFICE O/P EST HI 40 MIN: CPT

## 2021-02-11 PROCEDURE — 6370000000 HC RX 637 (ALT 250 FOR IP): Performed by: FAMILY MEDICINE

## 2021-02-11 PROCEDURE — 6360000002 HC RX W HCPCS: Performed by: INTERNAL MEDICINE

## 2021-02-11 PROCEDURE — 6370000000 HC RX 637 (ALT 250 FOR IP): Performed by: COLON & RECTAL SURGERY

## 2021-02-11 PROCEDURE — 6360000002 HC RX W HCPCS: Performed by: COLON & RECTAL SURGERY

## 2021-02-11 PROCEDURE — 97167 OT EVAL HIGH COMPLEX 60 MIN: CPT

## 2021-02-11 PROCEDURE — 2580000003 HC RX 258: Performed by: INTERNAL MEDICINE

## 2021-02-11 PROCEDURE — 2580000003 HC RX 258: Performed by: FAMILY MEDICINE

## 2021-02-11 PROCEDURE — 83735 ASSAY OF MAGNESIUM: CPT

## 2021-02-11 PROCEDURE — 1210000000 HC MED SURG R&B

## 2021-02-11 PROCEDURE — 6360000002 HC RX W HCPCS: Performed by: FAMILY MEDICINE

## 2021-02-11 PROCEDURE — 99232 SBSQ HOSP IP/OBS MODERATE 35: CPT | Performed by: INTERNAL MEDICINE

## 2021-02-11 PROCEDURE — 84145 PROCALCITONIN (PCT): CPT

## 2021-02-11 PROCEDURE — 99024 POSTOP FOLLOW-UP VISIT: CPT | Performed by: COLON & RECTAL SURGERY

## 2021-02-11 PROCEDURE — 97163 PT EVAL HIGH COMPLEX 45 MIN: CPT

## 2021-02-11 PROCEDURE — 2580000003 HC RX 258: Performed by: COLON & RECTAL SURGERY

## 2021-02-11 PROCEDURE — 85025 COMPLETE CBC W/AUTO DIFF WBC: CPT

## 2021-02-11 PROCEDURE — 80048 BASIC METABOLIC PNL TOTAL CA: CPT

## 2021-02-11 PROCEDURE — 36415 COLL VENOUS BLD VENIPUNCTURE: CPT

## 2021-02-11 RX ORDER — POTASSIUM CHLORIDE 20 MEQ/1
20 TABLET, EXTENDED RELEASE ORAL ONCE
Status: DISCONTINUED | OUTPATIENT
Start: 2021-02-11 | End: 2021-02-11

## 2021-02-11 RX ORDER — LORAZEPAM 2 MG/ML
0.5 INJECTION INTRAMUSCULAR EVERY 6 HOURS PRN
Status: DISCONTINUED | OUTPATIENT
Start: 2021-02-11 | End: 2021-02-18 | Stop reason: HOSPADM

## 2021-02-11 RX ORDER — SODIUM CHLORIDE 9 MG/ML
INJECTION, SOLUTION INTRAVENOUS CONTINUOUS
Status: DISCONTINUED | OUTPATIENT
Start: 2021-02-11 | End: 2021-02-16

## 2021-02-11 RX ORDER — METOCLOPRAMIDE HYDROCHLORIDE 5 MG/ML
5 INJECTION INTRAMUSCULAR; INTRAVENOUS EVERY 6 HOURS PRN
Status: DISCONTINUED | OUTPATIENT
Start: 2021-02-11 | End: 2021-02-18 | Stop reason: HOSPADM

## 2021-02-11 RX ADMIN — PIPERACILLIN AND TAZOBACTAM 3375 MG: 3; .375 INJECTION, POWDER, LYOPHILIZED, FOR SOLUTION INTRAVENOUS at 19:45

## 2021-02-11 RX ADMIN — LORAZEPAM 0.5 MG: 2 INJECTION INTRAMUSCULAR at 14:38

## 2021-02-11 RX ADMIN — METOCLOPRAMIDE HYDROCHLORIDE 5 MG: 5 INJECTION INTRAMUSCULAR; INTRAVENOUS at 14:47

## 2021-02-11 RX ADMIN — POTASSIUM BICARBONATE 40 MEQ: 782 TABLET, EFFERVESCENT ORAL at 21:00

## 2021-02-11 RX ADMIN — PIPERACILLIN AND TAZOBACTAM 3375 MG: 3; .375 INJECTION, POWDER, LYOPHILIZED, FOR SOLUTION INTRAVENOUS at 05:09

## 2021-02-11 RX ADMIN — HYDROMORPHONE HYDROCHLORIDE 0.5 MG: 1 INJECTION, SOLUTION INTRAMUSCULAR; INTRAVENOUS; SUBCUTANEOUS at 18:04

## 2021-02-11 RX ADMIN — ONDANSETRON 4 MG: 2 INJECTION INTRAMUSCULAR; INTRAVENOUS at 16:43

## 2021-02-11 RX ADMIN — SENNOSIDES 17.2 MG: 8.6 TABLET, FILM COATED ORAL at 21:00

## 2021-02-11 RX ADMIN — SODIUM CHLORIDE, POTASSIUM CHLORIDE, SODIUM LACTATE AND CALCIUM CHLORIDE: 600; 310; 30; 20 INJECTION, SOLUTION INTRAVENOUS at 12:28

## 2021-02-11 RX ADMIN — PIPERACILLIN AND TAZOBACTAM 3375 MG: 3; .375 INJECTION, POWDER, LYOPHILIZED, FOR SOLUTION INTRAVENOUS at 12:27

## 2021-02-11 RX ADMIN — SODIUM CHLORIDE: 9 INJECTION, SOLUTION INTRAVENOUS at 16:50

## 2021-02-11 RX ADMIN — ENOXAPARIN SODIUM 40 MG: 40 INJECTION SUBCUTANEOUS at 11:16

## 2021-02-11 ASSESSMENT — PAIN DESCRIPTION - PROGRESSION: CLINICAL_PROGRESSION: NOT CHANGED

## 2021-02-11 ASSESSMENT — PAIN DESCRIPTION - LOCATION
LOCATION: ABDOMEN
LOCATION: ABDOMEN

## 2021-02-11 ASSESSMENT — PAIN DESCRIPTION - FREQUENCY: FREQUENCY: INTERMITTENT

## 2021-02-11 ASSESSMENT — PAIN DESCRIPTION - DESCRIPTORS
DESCRIPTORS: ACHING;SPASM;SQUEEZING
DESCRIPTORS: SORE;SHARP

## 2021-02-11 ASSESSMENT — PAIN SCALES - GENERAL: PAINLEVEL_OUTOF10: 6

## 2021-02-11 ASSESSMENT — PAIN DESCRIPTION - PAIN TYPE: TYPE: SURGICAL PAIN

## 2021-02-11 NOTE — PROGRESS NOTES
Physical Therapy Med Surg Initial Assessment  Facility/Department:  Critical access hospital UNIT  [id number]       NAME: Alli Boyle  : 1950 (69 y.o.)  MRN: [id number]  CODE STATUS: Full Code    Date of Service: 2021    Patient Diagnosis(es): Malignant ascites [R18.0]   Chief Complaint   Patient presents with    Fatigue     sent by dr Rosalina Hayden for dehydration work up , patient has been vomiting and not keeping anything down and is very fatigued     Patient Active Problem List    Diagnosis Date Noted    Severe malnutrition ([address] 02/10/2021    Fatigue     Malignant ascites 2021        Past Medical History:   Diagnosis Date    Anxiety     panic attacks    Fibroids     has complete hysterctomy    Kidney stone     14 years ago     Past Surgical History:   Procedure Laterality Date    HYSTERECTOMY, TOTAL ABDOMINAL      LAPAROTOMY N/A 2021    EXPLORATORY LAPAROTOMY, SUBTOTAL COLECTOMY,  END ILLEOSTOMY, INSERTION GASTROSTOMY TUBE. performed by Cherelle Bear MD at [address] [place] [address] 2021    4,440 ml removed by Dr. Tracey Anguiano 2021    SIGMOIDOSCOPY BIOPSY FLEXIBLE performed by Ester Lucas MD at [facility]       Chart Reviewed: Yes  Patient assessed for rehabilitation services?: Yes  Family / Caregiver Present: Yes(spouse)  General Comment  Comments: Pt laying in bed-agreeable to PT eval with encouragement    Restrictions:  Position Activity Restriction  Other position/activity restrictions: NPO; abdominal precautions s/p lap     SUBJECTIVE: Subjective: Pt reports that she has meds recently. Pt states that she has anxiety.     Pain  Pre Treatment Pain Screening  Pain at present: 6  Scale Used: Numeric Score  Intervention List: Patient able to continue with treatment    Post Treatment Pain Screening:   Pain Screening  Patient Currently in Pain: Yes  Pain Assessment  Pain Assessment: 0-10  Pain Level: 6  Pain Type: Acute pain;Surgical pain Pain Location: Abdomen  Pain Descriptors: Sore;Sharp  Pain Frequency: Intermittent(worse with mobility)  Clinical Progression: Not changed  Functional Pain Assessment: Prevents or interferes with all active and some passive activities  Non-Pharmaceutical Pain Intervention(s): Repositioned    Prior Level of Function:  Social/Functional History  Lives With: Spouse  Type of Home: House  Home Layout: One level  Home Access: Level entry  Bathroom Shower/Tub: Walk-in shower  Bathroom Toilet: Handicap height  Bathroom Equipment: Shower chair  ADL Assistance: Independent  Homemaking Assistance: Independent  Homemaking Responsibilities: Yes  Ambulation Assistance: Independent  Transfer Assistance: Independent  Active : Yes  Occupation: Retired  Type of occupation:   Leisure & Hobbies: \"lots of hobbies\"    OBJECTIVE:   Vision: Impaired(one contact on R)  Hearing: Within functional limits    Cognition:  Overall Orientation Status: Within Functional Limits  Follows Commands: Within Functional Limits    Observation/Palpation  Posture: Fair(forward head, rounded shoulders, protracted scapula)  Observation: ill appearing, no acute distress, tube feed, iliostomy    ROM:  RLE AROM: WFL  LLE AROM : WFL    Strength:  Strength RLE  Comment: observed 3+/5  Strength LLE  Comment: observed 3+/5  Strength Other  Other: impaired core strength noted s/p lap    Neuro:  Balance  Posture: Fair  Sitting - Static: Fair;-(mod A progressing to SBA ~6 minutes prior to fatigue and requesting to lay down)  Sitting - Dynamic: Fair;-  Standing - Static: (unable to assess)     Tone RLE  RLE Tone: Normotonic  Tone LLE  LLE Tone: Normotonic  Motor Control  Gross Motor?: WFL  Sensation  Overall Sensation Status: WFL    Bed mobility  Rolling to Right: Maximum assistance  Supine to Sit: Maximum assistance;2 Person assistance  Sit to Supine: Maximum assistance;2 Person assistance Comment: pt instructed in log roll technique, abdominal splinting with pillow. hand over hand to facilate reaching across to bed rail    Transfers  Comment: unable to assess d/t pt declined further mobility d/t pain/anxiety/fatigue    Ambulation  Ambulation?: No(unable to progress to ambulation this date)    Activity Tolerance  Activity Tolerance: Patient Tolerated treatment well    Exercises  Comments: Pt instructed in anti embolics     PT Education  PT Education: Goals;PT Role;Plan of Care  Patient Education: abdominal precautions    ASSESSMENT:   Body structures, Functions, Activity limitations: Decreased functional mobility ; Decreased balance;Decreased ADL status; Decreased endurance;Decreased strength;Decreased posture  Decision Making: High Complexity  History: high  Exam: high  Clinical Presentation: hi    Prognosis: Good  Patient Education: abdominal precautions  Barriers to Learning: memory/cognitive    DISCHARGE RECOMMENDATIONS:  Discharge Recommendations: Continue to assess pending progress, Patient would benefit from continued therapy after discharge    Assessment: Pt demonstrates the above deficits and decline in functional mobility status. On this date, pt able to sit EOB with 2 person assistance. Pt would benefit from ongoing physical therapy to address above deficits and allow for safe return home at highest level of function, decrease risk for falls, and improve QOL.     REQUIRES PT FOLLOW UP: Yes      PLAN OF CARE:  Plan  Times per week: 3-6  Current Treatment Recommendations: Strengthening, Transfer Training, Endurance Training, Neuromuscular Re-education, Patient/Caregiver Education & Training, ROM, Wheelchair Mobility Training, Manual Therapy - Soft Tissue Mobilization, Pain Management, Equipment Evaluation, Education, & procurement, Balance Training, Gait Training, Home Exercise Program, Functional Mobility Training, Safety Education & Training, Stair training  Safety Devices Type of devices: Bed alarm in place, Call light within reach, Left in bed    Goals:  Patient goals : \"get better\"  Long term goals  Long term goal 1: Bed mobiity with supervision, demonstrating log roll  Long term goal 2: Functional transfers with supervision  Long term goal 3: Amb >/= 50ft with LRAD and supervision to navigate home home environment    [facility] ([address] [place] [address]) So 95 Raw Score : 9     Therapy Time:   Individual   Time In 1505   Time Out 1519   Minutes [id number] [place] [address], 02/11/21 at 4:58 PM       Definitions for assistance levels  Independent = pt does not require any physical supervision or assistance from another person for activity completion. Device may be needed.   Stand by assistance = pt requires verbal cues or instructions from another person, close to but not touching, to perform the activity  Minimal assistance= pt performs 75% or more of the activity; assistance is required to complete the activity  Moderate assistance= pt performs 50% of the activity; assistance is required to complete the activity  Maximal assistance = pt performs 25% of the activity; assistance is required to complete the activity  Dependent = pt requires total physical assistance to accomplish the task

## 2021-02-11 NOTE — PROGRESS NOTES
Pt Name: Davis Mclean  Medical Record Number: [id number]  Date of Birth 1950   Admit date 2/1/2021  1:30 PM  Today's Date: 2/11/2021     ASSESSMENT  1. Hospital day # 10  2 gastrostomy/subtotal colectomy and ileostomy  3.  present at bedside and discussion regarding optimal nutrition discussed. PLAN  1. Tube feeds as recommended by dietary  2. Pain control  3. Physical therapy    SUBJECTIVE  Chief complaint: Follow-up palliative colectomy and gastrostomy  Afebrile, vital signs are stable. She denies any nausea or vomiting, has not passed flatus or had a bowel movement. She is tolerating a DIET CLEAR LIQUID;  Diet Tube Feed Continuous/Cyclic w/ Diet. Her pain is well controlled on current medications. has a past medical history of Anxiety, Fibroids, and Kidney stone. CURRENT MEDS  Scheduled Meds:   sodium chloride flush  10 mL Intravenous 2 times per day    piperacillin-tazobactam  3,375 mg Intravenous Q8H    [Held by provider] insulin glargine  10 Units Subcutaneous Nightly    insulin lispro  0-6 Units Subcutaneous Q6H    pantoprazole  40 mg Oral QAM AC    [Held by provider] spironolactone  50 mg Oral Daily    [Held by provider] furosemide  20 mg Oral Daily    sodium chloride flush  10 mL Intravenous 2 times per day    enoxaparin  40 mg Subcutaneous Daily    senna  2 tablet Oral Nightly     Continuous Infusions:   lactated ringers 50 mL/hr at 02/10/21 2052    dextrose       PRN Meds:. HYDROmorphone, sodium chloride flush, glucose, dextrose, glucagon (rDNA), dextrose, potassium chloride, magnesium sulfate, sodium phosphate IVPB **OR** sodium phosphate IVPB, benzocaine, ketorolac, sodium chloride flush, promethazine **OR** ondansetron, acetaminophen **OR** acetaminophen    OBJECTIVE CURRENT VITALS:  height is 5' 5\" (1.651 m) and weight is 150 lb (68 kg). Her oral temperature is 97.9 °F (36.6 °C). Her blood pressure is 111/52 (abnormal) and her pulse is 39 (abnormal). Her respiration is 16 and oxygen saturation is 99%. Temperature Range (24h):Temp: 97.9 °F (36.6 °C) Temp  Av.7 °F (36.5 °C)  Min: 97.5 °F (36.4 °C)  Max: 97.9 °F (36.6 °C)  BP Range (56N): Systolic (07WFB), ZVW:397 , Min:105 , VDN:484     Diastolic (03TIY), KATIE:35, Min:46, Max:58    Pulse Range (24h): Pulse  Av.4  Min: 39  Max: 86  Respiration Range (24h): Resp  Av  Min: 14  Max: 18    GENERAL: alert, no distress  LUNGS: clear to ausculation, without wheezes, rales or rhonci  HEART: normal rate and regular rhythm  ABDOMEN: soft, non-tender, ileostomy without significant output, YULISA drain with serous fluid, bowel sounds present in all 4 quadrants and no guarding or peritoneal signs  EXTERMITY: no cyanosis, clubbing or edema  In: 2487 [P.O.:720; I.V.:1102; NG/GT:665]  Out: 8544 [Urine:800; Drains:235]  Date 21 0000 - 21 235   Shift 7595-8053 1883-6144 8676-8675 24 Hour Total   INTAKE   P.O.(mL/kg/hr) 120(0.2) 480  600   I. V.(mL/kg) [id number])   794(11.7)   NG/GT(mL/kg) 385(5.7)   385(5.7)   Shift Total(mL/kg) 2592(40.8) 480(7.1)  1779(26.1)   OUTPUT   Urine(mL/kg/hr) 450(0.8)   450   Emesis/NG output(mL/kg) 0(0)   0(0)   Drains(mL/kg) 80(1.2)   80(1.2)   Stool(mL/kg) 20(0.3)   20(0.3)   Shift Total(mL/kg) 550(8.1)   550(8.1)   Weight (kg) 68 68 68 68       LABS  Recent Labs     21  0528 02/10/21  0508 21  0643   WBC 10.4 14.4* 19.3*   HGB 13.6 9.2* 9.1*   HCT 43.9 27.9* 28.2*    222 246   * 135 138   K 4.0 3.5 3.1*   * 108* 110*   CO2 10* 18* 18*   BUN 59* 42* 29*   CREATININE 1.58* 1.26* 0.94*   MG  --  1.3* 2.0   CALCIUM 7.0* 7.0* 7.2*      No results for input(s): PTT, INR in the last 72 hours.     Invalid input(s): PT No results for input(s): AST, ALT, BILITOT, BILIDIR, AMYLASE, LIPASE, LDH, LACTA in the last 72 hours. RADIOLOGY  Echocardiogram Complete 2d With Doppler With Color    Result Date: 2/3/2021  Transthoracic Echocardiography Report (TTE)  Demographics  Patient Name    Brooks Hanley Gender               Female  Patient Number  [id number]       Race                                                  Ethnicity  Visit Number    [id number]      Room Number          D265  Corporate ID                   Date of Study        02/03/2021  Accession       [id number]     Referring Physician  Number  Date of Birth   1950     Sonographer          [address] [place] [address]  Age             69 year(s)     Interpreting         [facility])                                 Physician            Cardiology                                                      Alfredo Bates MD Procedure Type of Study  TTE procedure:ECHO COMPLETE 2D W/DOP W/COLOR. Procedure Date Date: 02/03/2021 Start: 08:33 AM Study Location: Portable Technical Quality: Poor visualization due to poor acoustical window. Indications:LVF. Patient Status: Routine Height: 65 inches Weight: 153 pounds BSA: 1.77 m^2 BMI: 25.46 kg/m^2  Conclusions  Summary  Very limited and technically difficult study. LV Function appears to be preserved. will need repeat at later time.  when more stable  Signature  ----------------------------------------------------------------  Electronically signed by Alfredo Bates MD(Interpreting  physician) on 02/03/2021 12:07 PM  ----------------------------------------------------------------    Xr Abdomen (kub) (single Ap View)    Result Date: 2/4/2021 EXAMINATION: KUB CLINICAL HISTORY: No bowel movement for 2 weeks. COMPARISON :None FINDINGS: 3 view of the abdomen  submitted. There are multiply dilated and distended loops of large bowel. The transition point may be within the region of the distal descending colon and sigmoid. Bowel gas is seen within small bowel. 3 view of the abdomen shows bowel gas in both large and small bowel. DISTENDED DILATED LOOPS OF LARGE BOWEL WITH A TRANSITION POINT IN THE REGION OF THE DESCENDING COLON, SIGMOID. FINDINGS COULD SUGGEST THAT OF POSSIBLE HIGH-GRADE OBSTRUCTION. FURTHER EVALUATION IS WARRANTED. Us Pelvis Complete    Result Date: 2/3/2021  EXAMINATION:  US PELVIS COMPLETE CLINICAL HISTORY: Ascites. COMPARISONS:  NONE AVAILABLE TECHNIQUE:  Transabdominal ultrasound of the pelvis. FINDINGS:  The uterus is surgically absent. Both left and right ovaries are surgically absent. There is free fluid within the pelvis. FREE FLUID WITHIN THE PELVIS. EXAMINATION:  US DUP ABD PEL RETRO SCROT COMPLETE CLINICAL HISTORY: Abnormal CT scan. COMPARISONS:  NONE AVAILABLE TECHNIQUE:  Transabdominal ultrasound of the right upper quadrant with both duplex color ultrasound and spectral Doppler ultrasound with interrogation of the hepatic and portal venous system was performed. FINDINGS:  The visualized portion of the right lobe of the liver shows no focal parenchymal abnormalities. The hypoechoic area seen at the inferior medial aspect of the right lobe of liver on CT is not appreciated. Within the field-of-view there are findings of cholelithiasis. There is ascites present. The hepatic veins and portal veins are patent. Normal direction seen within the portal veins and hepatic vein. IMPRESSION:  1. ASCITES IS PRESENT WITHIN THE FIELD-OF-VIEW. 2. CHOLELITHIASIS. 3. UNREMARKABLE SONOGRAPHIC EXAMINATION OF THE HEPATIC AND PORTAL VEIN.     Ct Abdomen Pelvis W Iv Contrast Additional Contrast? None    Result Date: 2/1/2021 EXAM:  CT ABDOMEN PELVIS W IV CONTRAST History: Abdominal distention. Abdominal pain. Technique: Multiple contiguous axial images were obtained of the abdomen and pelvis from an level of the lung bases through the ischial tuberosities with IV contrast. Multiplanar reformats were obtained. Delayed images were obtained. Comparison: None available Findings: Small right pleural effusion. Bibasilar subsegmental atelectasis. Subtle nodularity of the liver. A 1 cm hypodense lesion within the right lobe of the liver is seen on axial series 2 image 29 and an ill-defined area of hypodensity within the inferior right lobe of the liver measuring approximately 2 cm is seen on axial  series 2 image 38. The gallbladder is physiologically distended and contains multiple small densities within the dependent portion. No gallbladder wall thickening is identified. The stomach, spleen, pancreas, and adrenal glands appear within normal limits. Small hiatal hernia. There is thickening and nodularity of the omentum. There is a large amount of ascites demonstrating simple fluid density. The kidneys enhance uniformly. There is mild right-sided hydronephrosis however no radiopaque or urinary tract calculi identified. No left-sided urinary tract calculi or hydronephrosis. Urinary bladder is suboptimally distended. There is mild perivesicular inflammation. The uterus is surgically absent Abdominal aorta is nonaneurysmal  . No retroperitoneal or abdominal/pelvic lymphadenopathy. No small bowel obstruction. No overt colonic mass or pericolonic inflammation although the large amount of ascites obscures evaluation for inflammation. There is a large amount of stool within the rectosigmoid colon. The appendix is not definitively visualized. No pneumoperitoneum or portal venous gas. No acute or aggressive osseous abnormality. Degenerative changes of the spine. Thickening and nodularity of the omentum concerning for peritoneal metastasis or peritonitis. A large amount of ascites is present. Two ill-defined lesions within the right lobe of the liver measuring 1 cm and 2 cm respectively are nonspecific but most concerning for metastatic lesions. Mild perivesicular inflammation. Correlation with urinalysis recommended to exclude cystitis. Large amount stool within the rectosigmoid colon may represent constipation and/or fecal impaction. Subtle nodularity of the liver suggests cirrhosis. Mild right-sided hydronephrosis without radiopaque calculus. Debris within the gallbladder likely represents gallstones and/or gallbladder sludge. Small right pleural effusion. All CT scans at this facility use dose modulation, iterative reconstruction, and/or weight based dosing when appropriate to reduce radiation dose to as low as reasonably achievable. Us Abdomen Limited    Result Date: 2/3/2021  CLINICAL INDICATION:   Patient with new ascites and abdominal pain, concern for metastatic disease COMPARISON: CT dating February 1, 2021 DISCUSSION:     Transverse and longitudinal images of the right upper quadrant of the abdomen were obtained with color Doppler interrogation of the hepatic vessels. The liver is normal  in echogenicity and no evidence of focal masses seen in the right liver lobe. The hepatic vein, and portal veins are patent. There is normal direction of blood flow in the portal veins towards the liver. The main portal vein, right portal vein, left portal vein, and hepatic vein are patent with normal direction. The common hepatic artery is patent with velocity measuring 72 cm/s. The gallbladder is seen with echogenic gallstones. The gallbladder wall measures 2.4 mm. The portal veins, hepatic vein, and common hepatic artery are patent with normal direction of blood flow. No lesions are seen in the right liver lobe, though ultrasound is limited, and if there is clinical suspicion, MRI of the liver can be obtained. Us Dup Abd Pel Retro Scrot Complete    Result Date: 2/3/2021  EXAMINATION:  US PELVIS COMPLETE CLINICAL HISTORY: Ascites. COMPARISONS:  NONE AVAILABLE TECHNIQUE:  Transabdominal ultrasound of the pelvis. FINDINGS:  The uterus is surgically absent. Both left and right ovaries are surgically absent. There is free fluid within the pelvis. FREE FLUID WITHIN THE PELVIS. EXAMINATION:  US DUP ABD PEL RETRO SCROT COMPLETE CLINICAL HISTORY: Abnormal CT scan. COMPARISONS:  NONE AVAILABLE TECHNIQUE:  Transabdominal ultrasound of the right upper quadrant with both duplex color ultrasound and spectral Doppler ultrasound with interrogation of the hepatic and portal venous system was performed. FINDINGS:  The visualized portion of the right lobe of the liver shows no focal parenchymal abnormalities. The hypoechoic area seen at the inferior medial aspect of the right lobe of liver on CT is not appreciated. Within the field-of-view there are findings of cholelithiasis. There is ascites present. The hepatic veins and portal veins are patent. Normal direction seen within the portal veins and hepatic vein. IMPRESSION:  1. ASCITES IS PRESENT WITHIN THE FIELD-OF-VIEW. 2. CHOLELITHIASIS. 3. UNREMARKABLE SONOGRAPHIC EXAMINATION OF THE HEPATIC AND PORTAL VEIN. Xr Chest Abdomen Ng Placement    Result Date: 2/4/2021  EXAMINATION: LOW CHEST HIGH KUB CLINICAL HISTORY: NG tube placement COMPARISON : The right fourth 2021 FINDINGS: Interval placement of nasogastric tube. The tip is within the stomach. Within the field-of-view there is still prominent dilated loops of large bowel. .     INTERVAL PLACEMENT OF NASOGASTRIC TUBE AS DESCRIBED ABOVE    Us Guided Paracentesis 1.  Status post technically successful ultrasound-guided paracentesis. Rodolfo Kenyon is a Female of 69 years age, referred for Ultrasound Guided Paracentesis. PROCEDURE: Survey of the abdomen showed large amount of ascites fluid. After obtaining informed consent, the patient was positioned supine on the sonography table. Using ultrasound, the skin over the left hemiabdomen was locally anesthetized with 1% lidocaine. Following that, a Yueh needle was advanced into the fluid pocket using ultrasound visualization. 4440cc, of clear yellow fluid were aspirated and sent for cytology, and pathology. The needle was removed, and hemostasis was obtained with pressure. A Band-Aid was placed. Post procedure images did not demonstrate hemorrhage at the target site. The patient tolerated the procedure well. The patient left the department in good condition. A radiology nurse was in presence monitoring vital signs, assisting throughout the procedure.      Fl Small Bowel Follow Through Only    Result Date: 2/7/2021 Small bowel follow-through. HISTORY: Postcolonoscopy. COMPARISON: CT abdomen pelvis, February 1, 2021. FINDINGS:  image shows a nasogastric tube in stomach. Marked diffuse dilatation of colon cecum sigmoid colon. 0 minute film shows administration of oral contrast medium via nasogastric tube with contrast medium filling in stomach. 30 minute image shows contrast medium within attenuated distal stomach. Contrast medium visualized filling the duodenum with loop of small bowel coursing into right lower quadrant. Contrast medium also fills normal caliber jejunal loops and epigastric area residual contrast medium from attempted barium enema identified sigmoid colon. 60 minute image shows contrast medium pooling within jejunal loops in the right lower quadrant, and contrast medium to a lesser degree filling jejunal loops found in the left perigastric region. 90 minute image shows mild progression of contrast medium into proximal and mid jejunal loops. Several loops shows little progression of contrast medium passed level of mid jejunum. 4 hour image shows contrast medium with bowel loops in left lower quadrant. However, contrast media now visualized overlying region of the ileocecal valve and portion of ascending colon. 6 hour image shows additional contrast medium entering ascending colon and now entering proximal transverse colon, splenic flexure, and proximal descending colon. There is marked evaluate attenuation of contrast medium within the small bowel. At 8.5 hours, there is contrast medium continues to empty from the small bowel, and now is layering, in the dilated ascending colon. Dilatation of the cecum, measuring approximately 13.7 cm is identified. Residual contrast medium still fills the rectum. Final imaging of 21.5 hours shows near complete evacuation of contrast medium from the small bowel, January dilatation seen to the transverse diameter 17.2 cm, and contrast medium filling the ascending colon. There is still markedly pronounced more distal colon. Dilated small bowel loops, with persistent delay at level of proximal jejunum. Contrast medium visualized ileocecal valve at 4 hours. Following remains air-filled and dilated at that time. Findings suggest stenosis/partial obstruction at level of distal colon and at level of mid jejunum. Fl Barium Enema    Result Date: 2/8/2021  COMPARISON: February 4, 2021 Fluoroscopic time. 1.6 minutes 17 images were obtained. HISTORY:    Abdominal pain, possible obstruction PATIENT NAME: JOEY SALDANA: TECHNIQUE: FL BARIUM ENEMA FINDINGS: Air is seen within bowel. An NG tube is seen in place with tip in the area of the stomach. Dilated loops of bowel are again seen. The most dilated loop measures up to 11.7 cm. Barium was introduced into the rectum. When the barium went into the distal sigmoid colon, patient was  unable to tolerate the barium and asked for the procedure to be stop. The barium was drained from the colon. Incomplete barium enema. Patient unable to tolerate procedure. There is diffuse dilation of the bowel.     Electronically signed by Heri Guzman MD on 2/11/2021 at 11:00 AM

## 2021-02-11 NOTE — PROGRESS NOTES
Capillary Refill: Brisk,< 3 seconds   Peripheral Pulses: +2 palpable, equal bilaterally     Labs:   Recent Labs     02/08/21  0616 02/09/21  0528 02/10/21  0508   WBC 15.1* 10.4 14.4*   HGB 13.9 13.6 9.2*   HCT 41.8 43.9 27.9*    230 222     Recent Labs     02/08/21  0616 02/09/21  0528 02/10/21  0508   * 131* 135   K 3.4 4.0 3.5   CL 96 109* 108*   CO2 18* 10* 18*   BUN 63* 59* 42*   CREATININE 1.85* 1.58* 1.26*   CALCIUM 8.3* 7.0* 7.0*     No results for input(s): AST, ALT, BILIDIR, BILITOT, ALKPHOS in the last 72 hours. No results for input(s): INR in the last 72 hours. No results for input(s): Tara Sutton in the last 72 hours. Urinalysis:      Lab Results   Component Value Date    NITRU Negative 02/01/2021    BLOODU Negative 02/01/2021    SPECGRAV 1.065 02/01/2021    GLUCOSEU Negative 02/01/2021     Radiology:  FL BARIUM ENEMA   Final Result   Incomplete barium enema. Patient unable to tolerate procedure. There is diffuse dilation of the bowel. FL SMALL BOWEL FOLLOW THROUGH ONLY   Final Result      Dilated small bowel loops, with persistent delay at level of proximal jejunum. Contrast medium visualized ileocecal valve at 4 hours. Following remains air-filled and dilated at that time. Findings suggest stenosis/partial obstruction at level of distal    colon and at level of mid jejunum. XR CHEST ABDOMEN NG PLACEMENT   Final Result   INTERVAL PLACEMENT OF NASOGASTRIC TUBE AS DESCRIBED ABOVE      XR ABDOMEN (KUB) (SINGLE AP VIEW)   Final Result   DISTENDED DILATED LOOPS OF LARGE BOWEL WITH A TRANSITION POINT IN THE REGION OF THE DESCENDING COLON, SIGMOID. FINDINGS COULD SUGGEST THAT OF POSSIBLE HIGH-GRADE OBSTRUCTION. FURTHER EVALUATION IS WARRANTED. US PELVIS COMPLETE   Preliminary Result   FREE FLUID WITHIN THE PELVIS. EXAMINATION:  US DUP ABD PEL RETRO SCROT COMPLETE      CLINICAL HISTORY: Abnormal CT scan.       COMPARISONS:  NONE AVAILABLE 1.  Status post technically successful ultrasound-guided paracentesis. Erik Lacey is a Female of 69 years age, referred for Ultrasound Guided Paracentesis. PROCEDURE: Survey of the abdomen showed large amount of ascites fluid. After obtaining informed consent, the patient was positioned supine on the sonography table. Using ultrasound, the skin over the left hemiabdomen was locally anesthetized with 1% lidocaine. Following that, a Yueh needle was advanced into the fluid    pocket using ultrasound visualization. 4440cc, of clear yellow fluid were aspirated and sent for cytology, and pathology. The needle was removed, and hemostasis was obtained with pressure. A Band-Aid was placed. Post procedure images did not demonstrate hemorrhage at the target site. The patient tolerated the procedure well. The patient left the department in good condition. A radiology nurse was in presence monitoring vital signs, assisting throughout the procedure. US ABDOMEN LIMITED   Final Result   The portal veins, hepatic vein, and common hepatic artery are patent with normal direction of blood flow. No lesions are seen in the right liver lobe, though ultrasound is limited, and if there is clinical suspicion, MRI of the liver can be    obtained. CT ABDOMEN PELVIS W IV CONTRAST Additional Contrast? None   Final Result      Thickening and nodularity of the omentum concerning for peritoneal metastasis or peritonitis. A large amount of ascites is present. Two ill-defined lesions within the right lobe of the liver measuring 1 cm and 2 cm respectively are nonspecific but most concerning for metastatic lesions. Mild perivesicular inflammation. Correlation with urinalysis recommended to exclude cystitis. Large amount stool within the rectosigmoid colon may represent constipation and/or fecal impaction. Subtle nodularity of the liver suggests cirrhosis. Mild right-sided hydronephrosis without radiopaque calculus. Debris within the gallbladder likely represents gallstones and/or gallbladder sludge. Small right pleural effusion. All CT scans at this facility use dose modulation, iterative reconstruction, and/or weight based dosing when appropriate to reduce radiation dose to as low as reasonably achievable. Assessment/Plan:    1. 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 Possibly pancreatic vs cholangiocarcinoma based on CA 19-9; plan for MRCP when more stable; other sources being worked up by oncology  2. High grade obstruction of descending colon: Will need colectomy; patient is deciding; if she is agreeable then plan for OR tomorrow at 3pm  1. 2/8 - s/p colectomy with diverting ostomy, moved to icu for extensive procedure post op care. 2. 2/9 - pod 1, sips, cont post op care per surgery; ICU as required levophed for post op hypotension  3. 2/10 - pod 2, cont routine care, clears, tube feeds, monitor drain output  3. Hyponatremia/BALA  1. 2/9 - both improved with iv fluids  4. Right sided pleural effusion:  Likely from the ascites; ascites drained  5. Functional Status: Fall precautions. Up with assistance. PT OT  6. Diet: NPO  7. DVT ppx: Lovenox SCDs  8. Disposition: Dependent on hospital course. Will discharge once medically stable. SW on board for discharge planning. Active Hospital Problems    Diagnosis Date Noted    Severe malnutrition ([address] [E43] 02/10/2021    Fatigue [R53.83]     Malignant ascites [R18.0] 02/01/2021     Additional work up or/and treatment plan may be added today or then after based on clinical progression. I am managing a portion of pt care. Some medical issues are handled by other specialists. Additional work up and treatment should be done in out pt setting by pt PCP and other out pt providers. In addition to examining and evaluating pt, I spent additional time explaining care, normal and abnormal findings, and treatment plan. All of pt questions were answered. Counseling, diet and education were  provided. Case will be discussed with nursing staff when appropriate. Family will be updated if and when appropriate.       Diet: DIET CLEAR LIQUID;    Code Status: Full Code    PT/OT Eval     Electronically signed by Ifeanyi Brown MD on 2/11/2021 at 3:16 AM

## 2021-02-11 NOTE — PROGRESS NOTES
INPATIENT PROGRESS NOTES    PATIENT NAME: Ayaan Ocampo  MRN: [id number]  SERVICE DATE:  February 11, 2021   SERVICE TIME:  11:12 AM      PRIMARY SERVICE: Pulmonary Disease    CHIEF COMPLAIN: Hypotension      INTERVAL HPI: Patient seen and examined at bedside, Interval Notes, orders reviewed. Nursing notes noted  She is transfer out of ICU. She has abdominal carcinomatosis, s/p laparotomy with subtotal colectomy and end ileostomy with gastrostomy. She is not having any shortness of breath. Cough or sputum production. She is on room air and O2 saturation 99%. Her blood pressure is 111/52. Colorectal surgeon Dr. Alexis Crooks is following patient. She was also seen by hematology and oncology    OBJECTIVE    Body mass index is 24.96 kg/m². PHYSICAL EXAM:  Vitals:  BP (!) 111/52   Pulse (!) 39   Temp 97.9 °F (36.6 °C) (Oral)   Resp 16   Ht 5' 5\" (1.651 m)   Wt 150 lb (68 kg)   SpO2 99%   BMI 24.96 kg/m²   General: Alert, awake . comfortable in bed, No distress. Head: Atraumatic , Normocephalic   Eyes: PERRL. No sclera icterus. No conjunctival injection. No discharge   ENT: No nasal  discharge. Pharynx clear. Neck:  Trachea midline. No thyromegaly, no JVD, No cervical adenopathy. Chest : Bilaterally symmetrical ,Normal effort,  No accessory muscle use  Lung : . Fair BS bilateral, decreased BS at bases. No Rales. No wheezing. No rhonchi. Heart[de-identified] Normal  rate. Regular rhythm.  No mumur ,  Rub or gallop  ABD: Status post ileostomy and gastrostomy  Ext : No Pitting both leg , No Cyanosis No clubbing  Neuro: no focal weakness          DATA:   Recent Labs     02/10/21  0508 02/11/21  0643   WBC 14.4* 19.3*   HGB 9.2* 9.1*   HCT 27.9* 28.2*   MCV 86.6 87.0    246     Recent Labs     02/10/21  0508 02/11/21  0643    138   K 3.5 3.1*   * 110*   CO2 18* 18*   BUN 42* 29*   CREATININE 1.26* 0.94*   GLUCOSE 92 116*   CALCIUM 7.0* 7.2*   LABGLOM 41.8* 58.6*   GFRAA 50.5* >60.0       MV Settings: No results for input(s): PHART, JYT2JFL, PO2ART, XNT1JZA, BEART, S6BRLOEX in the last 72 hours.     O2 Device: None (Room air)  O2 Flow Rate (L/min): 2 L/min    DIET CLEAR LIQUID;  Diet Tube Feed Continuous/Cyclic w/ Diet     MEDICATIONS during current hospitalization:    Continuous Infusions:   lactated ringers 50 mL/hr at 02/10/21 2052    dextrose         Scheduled Meds:   sodium chloride flush  10 mL Intravenous 2 times per day    piperacillin-tazobactam  3,375 mg Intravenous Q8H    [Held by provider] insulin glargine  10 Units Subcutaneous Nightly    insulin lispro  0-6 Units Subcutaneous Q6H    pantoprazole  40 mg Oral QAM AC    [Held by provider] spironolactone  50 mg Oral Daily    [Held by provider] furosemide  20 mg Oral Daily    sodium chloride flush  10 mL Intravenous 2 times per day    enoxaparin  40 mg Subcutaneous Daily    senna  2 tablet Oral Nightly       PRN Meds:HYDROmorphone, sodium chloride flush, glucose, dextrose, glucagon (rDNA), dextrose, potassium chloride, magnesium sulfate, sodium phosphate IVPB **OR** sodium phosphate IVPB, benzocaine, ketorolac, sodium chloride flush, promethazine **OR** ondansetron, acetaminophen **OR** acetaminophen    Radiology  Echocardiogram Complete 2d With Doppler With Color    Result Date: 2/3/2021 Transthoracic Echocardiography Report (TTE)  Demographics  Patient Name    Damon Villagomez Gender               Female  Patient Number  [id number]       Race                                                  Ethnicity  Visit Number    [id number]      Room Number          Q704  Corporate ID                   Date of Study        02/03/2021  Accession       [id number]     Referring Physician  Number  Date of Birth   1950     Sonographer          [address]  Age             69 year(s)     Interpreting         [facility]                                 Physician            Cardiology                                                      Elda Flower MD Procedure Type of Study  TTE procedure:ECHO COMPLETE 2D W/DOP W/COLOR. Procedure Date Date: 02/03/2021 Start: 08:33 AM Study Location: Portable Technical Quality: Poor visualization due to poor acoustical window. Indications:LVF. Patient Status: Routine Height: 65 inches Weight: 153 pounds BSA: 1.77 m^2 BMI: 25.46 kg/m^2  Conclusions  Summary  Very limited and technically difficult study. LV Function appears to be preserved. will need repeat at later time. when more stable  Signature  ----------------------------------------------------------------  Electronically signed by Elda Flower MD(Interpreting  physician) on 02/03/2021 12:07 PM  ----------------------------------------------------------------    Xr Abdomen (kub) (single Ap View)    Result Date: 2/4/2021  EXAMINATION: KUB CLINICAL HISTORY: No bowel movement for 2 weeks. COMPARISON :None FINDINGS: 3 view of the abdomen  submitted. There are multiply dilated and distended loops of large bowel. The transition point may be within the region of the distal descending colon and sigmoid. Bowel gas is seen within small bowel. 3 view of the abdomen shows bowel gas in both large and small bowel. DISTENDED DILATED LOOPS OF LARGE BOWEL WITH A TRANSITION POINT IN THE REGION OF THE DESCENDING COLON, SIGMOID. FINDINGS COULD SUGGEST THAT OF POSSIBLE HIGH-GRADE OBSTRUCTION. FURTHER EVALUATION IS WARRANTED. Us Pelvis Complete    Result Date: 2/3/2021  EXAMINATION:  US PELVIS COMPLETE CLINICAL HISTORY: Ascites. COMPARISONS:  NONE AVAILABLE TECHNIQUE:  Transabdominal ultrasound of the pelvis. FINDINGS:  The uterus is surgically absent. Both left and right ovaries are surgically absent. There is free fluid within the pelvis. FREE FLUID WITHIN THE PELVIS. EXAMINATION:  US DUP ABD PEL RETRO SCROT COMPLETE CLINICAL HISTORY: Abnormal CT scan. COMPARISONS:  NONE AVAILABLE TECHNIQUE:  Transabdominal ultrasound of the right upper quadrant with both duplex color ultrasound and spectral Doppler ultrasound with interrogation of the hepatic and portal venous system was performed. FINDINGS:  The visualized portion of the right lobe of the liver shows no focal parenchymal abnormalities. The hypoechoic area seen at the inferior medial aspect of the right lobe of liver on CT is not appreciated. Within the field-of-view there are findings of cholelithiasis. There is ascites present. The hepatic veins and portal veins are patent. Normal direction seen within the portal veins and hepatic vein. IMPRESSION:  1. ASCITES IS PRESENT WITHIN THE FIELD-OF-VIEW. 2. CHOLELITHIASIS. 3. UNREMARKABLE SONOGRAPHIC EXAMINATION OF THE HEPATIC AND PORTAL VEIN.     Ct Abdomen Pelvis W Iv Contrast Additional Contrast? None    Result Date: 2/1/2021 EXAM:  CT ABDOMEN PELVIS W IV CONTRAST History: Abdominal distention. Abdominal pain. Technique: Multiple contiguous axial images were obtained of the abdomen and pelvis from an level of the lung bases through the ischial tuberosities with IV contrast. Multiplanar reformats were obtained. Delayed images were obtained. Comparison: None available Findings: Small right pleural effusion. Bibasilar subsegmental atelectasis. Subtle nodularity of the liver. A 1 cm hypodense lesion within the right lobe of the liver is seen on axial series 2 image 29 and an ill-defined area of hypodensity within the inferior right lobe of the liver measuring approximately 2 cm is seen on axial  series 2 image 38. The gallbladder is physiologically distended and contains multiple small densities within the dependent portion. No gallbladder wall thickening is identified. The stomach, spleen, pancreas, and adrenal glands appear within normal limits. Small hiatal hernia. There is thickening and nodularity of the omentum. There is a large amount of ascites demonstrating simple fluid density. The kidneys enhance uniformly. There is mild right-sided hydronephrosis however no radiopaque or urinary tract calculi identified. No left-sided urinary tract calculi or hydronephrosis. Urinary bladder is suboptimally distended. There is mild perivesicular inflammation. The uterus is surgically absent Abdominal aorta is nonaneurysmal  . No retroperitoneal or abdominal/pelvic lymphadenopathy. No small bowel obstruction. No overt colonic mass or pericolonic inflammation although the large amount of ascites obscures evaluation for inflammation. There is a large amount of stool within the rectosigmoid colon. The appendix is not definitively visualized. No pneumoperitoneum or portal venous gas. No acute or aggressive osseous abnormality. Degenerative changes of the spine. Thickening and nodularity of the omentum concerning for peritoneal metastasis or peritonitis. A large amount of ascites is present. Two ill-defined lesions within the right lobe of the liver measuring 1 cm and 2 cm respectively are nonspecific but most concerning for metastatic lesions. Mild perivesicular inflammation. Correlation with urinalysis recommended to exclude cystitis. Large amount stool within the rectosigmoid colon may represent constipation and/or fecal impaction. Subtle nodularity of the liver suggests cirrhosis. Mild right-sided hydronephrosis without radiopaque calculus. Debris within the gallbladder likely represents gallstones and/or gallbladder sludge. Small right pleural effusion. All CT scans at this facility use dose modulation, iterative reconstruction, and/or weight based dosing when appropriate to reduce radiation dose to as low as reasonably achievable. Us Abdomen Limited    Result Date: 2/3/2021  CLINICAL INDICATION:   Patient with new ascites and abdominal pain, concern for metastatic disease COMPARISON: CT dating February 1, 2021 DISCUSSION:     Transverse and longitudinal images of the right upper quadrant of the abdomen were obtained with color Doppler interrogation of the hepatic vessels. The liver is normal  in echogenicity and no evidence of focal masses seen in the right liver lobe. The hepatic vein, and portal veins are patent. There is normal direction of blood flow in the portal veins towards the liver. The main portal vein, right portal vein, left portal vein, and hepatic vein are patent with normal direction. The common hepatic artery is patent with velocity measuring 72 cm/s. The gallbladder is seen with echogenic gallstones. The gallbladder wall measures 2.4 mm. 1.  Status post technically successful ultrasound-guided paracentesis. Kalee Reveles is a Female of 69 years age, referred for Ultrasound Guided Paracentesis. PROCEDURE: Survey of the abdomen showed large amount of ascites fluid. After obtaining informed consent, the patient was positioned supine on the sonography table. Using ultrasound, the skin over the left hemiabdomen was locally anesthetized with 1% lidocaine. Following that, a Yueh needle was advanced into the fluid pocket using ultrasound visualization. 4440cc, of clear yellow fluid were aspirated and sent for cytology, and pathology. The needle was removed, and hemostasis was obtained with pressure. A Band-Aid was placed. Post procedure images did not demonstrate hemorrhage at the target site. The patient tolerated the procedure well. The patient left the department in good condition. A radiology nurse was in presence monitoring vital signs, assisting throughout the procedure.      Fl Small Bowel Follow Through Only    Result Date: 2/7/2021 Small bowel follow-through. HISTORY: Postcolonoscopy. COMPARISON: CT abdomen pelvis, February 1, 2021. FINDINGS:  image shows a nasogastric tube in stomach. Marked diffuse dilatation of colon cecum sigmoid colon. 0 minute film shows administration of oral contrast medium via nasogastric tube with contrast medium filling in stomach. 30 minute image shows contrast medium within attenuated distal stomach. Contrast medium visualized filling the duodenum with loop of small bowel coursing into right lower quadrant. Contrast medium also fills normal caliber jejunal loops and epigastric area residual contrast medium from attempted barium enema identified sigmoid colon. 60 minute image shows contrast medium pooling within jejunal loops in the right lower quadrant, and contrast medium to a lesser degree filling jejunal loops found in the left perigastric region. 90 minute image shows mild progression of contrast medium into proximal and mid jejunal loops. Several loops shows little progression of contrast medium passed level of mid jejunum. 4 hour image shows contrast medium with bowel loops in left lower quadrant. However, contrast media now visualized overlying region of the ileocecal valve and portion of ascending colon. 6 hour image shows additional contrast medium entering ascending colon and now entering proximal transverse colon, splenic flexure, and proximal descending colon. There is marked evaluate attenuation of contrast medium within the small bowel. At 8.5 hours, there is contrast medium continues to empty from the small bowel, and now is layering, in the dilated ascending colon. Dilatation of the cecum, measuring approximately 13.7 cm is identified. Residual contrast medium still fills the rectum. Final imaging of 21.5 hours shows near complete evacuation of contrast medium from the small bowel, January dilatation seen to the transverse diameter 17.2 cm, and contrast medium filling the ascending colon. There is still markedly pronounced more distal colon. Dilated small bowel loops, with persistent delay at level of proximal jejunum. Contrast medium visualized ileocecal valve at 4 hours. Following remains air-filled and dilated at that time. Findings suggest stenosis/partial obstruction at level of distal colon and at level of mid jejunum. Fl Barium Enema    Result Date: 2/8/2021  COMPARISON: February 4, 2021 Fluoroscopic time. 1.6 minutes 17 images were obtained. HISTORY:    Abdominal pain, possible obstruction PATIENT NAME: JOEY SALDANA: TECHNIQUE: FL BARIUM ENEMA FINDINGS: Air is seen within bowel. An NG tube is seen in place with tip in the area of the stomach. Dilated loops of bowel are again seen. The most dilated loop measures up to 11.7 cm. Barium was introduced into the rectum. When the barium went into the distal sigmoid colon, patient was  unable to tolerate the barium and asked for the procedure to be stop. The barium was drained from the colon. Incomplete barium enema. Patient unable to tolerate procedure. There is diffuse dilation of the bowel. IMPRESSION AND SUGGESTION:  1. Hypotension, resolved  2. Small bowel obstruction status post subtotal colectomy, ileostomy and PEG tube  3. Peritoneal carcinomatosis  4. Acute kidney injury improving    She is on room air O2 saturation 99%. Blood pressure improved. No complaint of shortness of breath cough. Dr. Leslye Perez and Dr. Aziza Ramsey is following. Will sign off please call if needed    NOTE: This report was transcribed using voice recognition software. Every effort was made to ensure accuracy; however, inadvertent computerized transcription errors may be present.       Electronically signed by Shaila Antunez MD, [facility] on 2/11/2021 at 11:12 AM

## 2021-02-11 NOTE — PROGRESS NOTES
[facility] OCCUPATIONAL THERAPY EVALUATION - ACUTE     NAME: Jacqueline Merritt  : 1950 (69 y.o.)  MRN: [id number]  CODE STATUS: Full Code  Room: J550/C653-93    Date of Service: 2021    Patient Diagnosis(es): Malignant ascites [R18.0]   Chief Complaint   Patient presents with    Fatigue     sent by dr Linda Martinez for dehydration work up , patient has been vomiting and not keeping anything down and is very fatigued     Patient Active Problem List    Diagnosis Date Noted    Severe malnutrition ([address] 02/10/2021    Fatigue     Malignant ascites 2021        Past Medical History:   Diagnosis Date    Anxiety     panic attacks    Fibroids     has complete hysterctomy    Kidney stone     14 years ago     Past Surgical History:   Procedure Laterality Date    HYSTERECTOMY, TOTAL ABDOMINAL      LAPAROTOMY N/A 2021    EXPLORATORY LAPAROTOMY, SUBTOTAL COLECTOMY,  END ILLEOSTOMY, INSERTION GASTROSTOMY TUBE. performed by Jeff Rosario MD at [address] [place] [address] 2021    4,440 ml removed by Dr. Alona Carranza 2021    SIGMOIDOSCOPY BIOPSY FLEXIBLE performed by Mukul Cason MD at [facility]        Restrictions  Restrictions/Precautions: Fall Risk(mod sohrt score; high fall risk per clinical judgement; HOB >30 deg- NG tube)  Position Activity Restriction  Other position/activity restrictions: NPO; abdominal precautions s/p lap     Safety Devices: Safety Devices  Safety Devices in place: Yes  Type of devices:  All fall risk precautions in place        Subjective  Pre Treatment Pain Screening  Pain at present: 6  Scale Used: Numeric Score  Intervention List: Patient able to continue with treatment, Nurse/Physician notified  Comments / Details: abdominal discomfort    Pain Reassessment:   Pain Assessment  Patient Currently in Pain: No  Pain Assessment: 0-10  Pain Level: 6  Pain Type: Surgical pain  Pain Location: Abdomen  Pain Descriptors: Aching, Spasm, Squeezing Prior Level of Function:  Social/Functional History  Lives With: Spouse  Type of Home: House  Home Layout: One level  Home Access: Level entry  Bathroom Shower/Tub: Walk-in shower  Bathroom Toilet: Handicap height  Bathroom Equipment: Shower chair  ADL Assistance: Independent  Homemaking Assistance: Independent  Homemaking Responsibilities: Yes  Ambulation Assistance: Independent  Transfer Assistance: Independent  Active : Yes  Occupation: Retired  Type of occupation:   Leisure & Hobbies: \"lots of hobbies\"    OBJECTIVE:     Orientation Status:  Orientation  Overall Orientation Status: Within Functional Limits    Observation:  Observation/Palpation  Posture: Fair  Observation: NG tube, IV    Cognition Status:  Cognition  Overall Cognitive Status: Exceptions  Arousal/Alertness: Appropriate responses to stimuli  Following Commands:  Follows one step commands consistently, Follows multistep commands with increased time  Attention Span: Difficulty dividing attention  Memory: Decreased recall of recent events, Decreased short term memory, Decreased long term memory  Safety Judgement: Decreased awareness of need for safety, Decreased awareness of need for assistance  Problem Solving: Assistance required to generate solutions, Assistance required to identify errors made, Assistance required to correct errors made, Assistance required to implement solutions  Insights: Decreased awareness of deficits  Initiation: Requires cues for some  Sequencing: Requires cues for some  Cognition Comment: Increased time for sequencing and safety    Perception Status:  Perception  Overall Perceptual Status: WFL    Sensation Status:  Sensation  Overall Sensation Status: [facility]    Vision and Hearing Status:  Vision  Vision: Impaired(one contact on R)  Hearing  Hearing: Within functional limits     ROM:   LUE AROM (degrees)  LUE AROM : WFL  Left Hand AROM (degrees)  Left Hand AROM: WF  RUE AROM (degrees)  RUE AROM : [facility] Right Hand AROM (degrees)  Right Hand AROM: WFL    Strength:  LUE Strength  Gross LUE Strength: [facility]  L Hand General: 3+/5  LUE Strength Comment: grossly 3+/5  RUE Strength  Gross RUE Strength: Exceptions to [facility]  R Hand General: 3+/5  RUE Strength Comment: grossly 3+/5    Coordination, Tone, Quality of Movement: Tone RUE  RUE Tone: Normotonic  Tone LUE  LUE Tone: Normotonic  Coordination  Movements Are Fluid And Coordinated: No  Coordination and Movement description: Fine motor impairments, Decreased speed, Decreased accuracy, Right UE, Left UE    Hand Dominance:  Hand Dominance  Hand Dominance: Left    ADL Status:  ADL  Feeding: NPO(+ hand to mouth)  Grooming: Minimal assistance  UE Bathing: Moderate assistance  LE Bathing: Dependent/Total  UE Dressing: Moderate assistance  LE Dressing: Dependent/Total  Toileting: Dependent/Total  Additional Comments: Simulated ADLs as above.  Pt. with decreased trunk control, unable to maintain upright posture without assist  Toilet Transfers  Toilet Transfer: Unable to assess  Toilet Transfers Comments: Anticipate max A based on other mobility       Therapy key for assistance levels    Independent = Pt. is able to perform task with no assistance but may require a device   Stand by assistance = Pt. does not perform task at an independent level but does not need physical assistance, requires verbal cues  Minimal, Moderate, Maximal Assistance = Pt. requires physical assistance (25%, 50%, 75% assist from helper) for task but is able to actively participate in task   Dependent = Pt. requires total assistance with task and is not able to actively participate with task completion     Functional Mobility:  Functional Mobility  Functional Mobility Comments: unable to assess  Transfers  Sit to stand: Contact guard assistance  Stand to sit: Contact guard assistance  Transfer Comments: Pt very anxious and in pain with mobilty, declines to attempt    Bed Mobility  Bed mobility Rolling to Left: Minimal assistance  Rolling to Right: Moderate assistance  Supine to Sit: Moderate assistance, 2 Person assistance  Sit to Supine: Moderate assistance, 2 Person assistance  Scooting: Maximal assistance, 2 Person assistance  Comment: HOB elevated d/t NG tube. Pt. required increased time complete d/t fatigue and weakness. Pt. requires increased time and verbal cues    Seated and Standing Balance:  Balance  Sitting Balance: Stand by assistance  Standing Balance: Contact guard assistance    Functional Endurance:  Activity Tolerance  Activity Tolerance: Patient limited by pain, Patient limited by fatigue, Treatment limited secondary to medical complications (free text)    D/C Recommendations:  OT D/C RECOMMENDATIONS  REQUIRES OT FOLLOW UP: Yes    Equipment Recommendations:  OT Equipment Recommendations  Other: Continue to assess    OT Education:   OT Education  OT Education: Plan of Care, OT Role  Patient Education: Educated pt. on importance of mobility  Barriers to Learning: None    OT Follow Up:  OT D/C RECOMMENDATIONS  REQUIRES OT FOLLOW UP: Yes       Assessment/Discharge Disposition:  Assessment: Pt is a 70 y/o female, recently admitted to [facility] d/t malignant ascites. Pt seen for re-eval s/p abdominal surgery. Pt presents with the above deficits and may benefit from continued OT intervention to maximize independence and safety with ADL tasks.   Performance deficits / Impairments: Decreased functional mobility , Decreased strength, Decreased endurance, Decreased coordination, Decreased ADL status, Decreased high-level IADLs, Decreased posture, Decreased balance  Prognosis: Fair  Discharge Recommendations: Continue to assess pending progress  Decision Making: High Complexity  History: Pt's medical history is moderately complex  Exam: Pt. has 8 performance deficits  Assistance / Modification: Pt. requires max a    Six Click Score How much help for putting on and taking off regular lower body clothing?: Total  How much help for Bathing?: A Lot  How much help for Toileting?: Total  How much help for putting on and taking off regular upper body clothing?: A Lot  How much help for taking care of personal grooming?: A Little  How much help for eating meals?: A Little  AM-[facility] Inpatient Daily Activity Raw Score: 12  AM-PAC Inpatient ADL T-Scale Score : 30.6  ADL Inpatient CMS 0-100% Score: 66.57    Plan:  Plan  Times per week: 1-3x/week  Times per day: Daily  Plan weeks: duration of stay  Current Treatment Recommendations: Strengthening, Endurance Training, Patient/Caregiver Education & Training, Equipment Evaluation, Education, & procurement, Self-Care / ADL, Balance Training, Home Management Training, Functional Mobility Training, Pain Management, Safety Education & Training, Cognitive/Perceptual Training    Goals:   Patient will:    - Improve functional endurance to tolerate/complete 30 mins of ADL's  - Be SBA in UB ADLs   - Be Min A in LB ADLs  - Be Min A in ADL transfers without LOB  - Be Min A in toileting tasks  - Improve B hand fine motor coordination to [facility] in order to manage clothing fasteners/self-care containers in a timely manner  - Improve B UE strength and endurance to 4/5 in order to participate in self-care activities as projected. - Access appropriate D/C site with as few architectural barriers as possible. - Sequence self-care tasks with no verbal cues for safety    Patient Goal: Patient goals : \"I want to move better and get comfortable\"     Discussed and agreed upon: Yes Comments:     Therapy Time:   OT Individual Minutes  Time In: 1500  Time Out: 1517  Minutes: 17    Eval: 17 minutes     Electronically signed by:     DONNY Brunner  2/11/2021, 4:16 PM

## 2021-02-11 NOTE — PROGRESS NOTES
Hematology/Oncology   Progress Note        CHIEF COMPLAINT/HPI:  Follow up of abdominal carcinomatosis, s/p laparotomy with subtotal colectomy and end ileostomy with gastrostomy. Pathology pending. REVIEW OF SYSTEMS:    Unremarkable except for symptoms mentioned in HPI.     Current Inpatient Medications:    Current Facility-Administered Medications   Medication Dose Route Frequency Provider Last Rate Last Admin    HYDROmorphone (DILAUDID) injection 0.5 mg  0.5 mg Intravenous Q4H PRN Oliva Pablo MD        sodium chloride flush 0.9 % injection 10 mL  10 mL Intravenous 2 times per day Chad Feng MD   10 mL at 02/10/21 2048    sodium chloride flush 0.9 % injection 10 mL  10 mL Intravenous PRN [facility], MD   10 mL at 02/09/21 1929    piperacillin-tazobactam (ZOSYN) 3,375 mg in dextrose 5 % 50 mL IVPB extended infusion (mini-bag)  3,375 mg Intravenous Q8H Chad Feng MD 12.5 mL/hr at 02/11/21 0509 3,375 mg at 02/11/21 0509    lactated ringers infusion   Intravenous Continuous Roosevelt Cordero MD 50 mL/hr at 02/10/21 2052 New Bag at 02/10/21 2052    glucose (GLUTOSE) 40 % oral gel 15 g  15 g Oral PRN Roosevelt Cordero MD        dextrose 50 % IV solution  12.5 g Intravenous PRN Roosevelt Cordero MD   12.5 g at 02/10/21 1834    glucagon (rDNA) injection 1 mg  1 mg Intramuscular PRN Roosevelt Cordero MD        dextrose 5 % solution  100 mL/hr Intravenous PRN Roosevelt Cordero MD        [Held by provider] insulin glargine (LANTUS) injection vial 10 Units  10 Units Subcutaneous Nightly Roosevelt Cordero MD   Stopped at 02/09/21 2132    insulin lispro (HUMALOG) injection vial 0-6 Units  0-6 Units Subcutaneous Q6H Roosevelt Cordero MD   Stopped at 02/10/21 1206    potassium chloride 10 mEq/100 mL IVPB (Peripheral Line)  10 mEq Intravenous PRN Roosevelt Cordero  mL/hr at 02/08/21 1208 10 mEq at 02/08/21 1418  magnesium sulfate 1000 mg in dextrose 5% 100 mL IVPB  1,000 mg Intravenous PRN Roddy Arroyo MD        sodium phosphate 10.89 mmol in dextrose 5 % 250 mL IVPB  0.16 mmol/kg Intravenous PRN Roddy Arroyo MD        Or   Flory Graham sodium phosphate 21.75 mmol in dextrose 5 % 250 mL IVPB  0.32 mmol/kg Intravenous PRN Roddy Arroyo MD        benzocaine (HURRICAINE) 20 % oral spray   Mouth/Throat 4x Daily PRN Roddy Arroyo MD   Given at 02/07/21 1205    ketorolac (TORADOL) injection 15 mg  15 mg Intravenous Q6H PRN Roddy Arroyo MD   15 mg at 02/08/21 1105    pantoprazole (PROTONIX) tablet 40 mg  40 mg Oral QAM AC Roddy Arroyo MD        [Held by provider] spironolactone (ALDACTONE) tablet 50 mg  50 mg Oral Daily Roddy Arroyo MD        [Held by provider] furosemide (LASIX) tablet 20 mg  20 mg Oral Daily Roddy Arroyo MD        sodium chloride flush 0.9 % injection 10 mL  10 mL Intravenous 2 times per day Roddy Arroyo MD   10 mL at 02/10/21 2048    sodium chloride flush 0.9 % injection 10 mL  10 mL Intravenous PRN Roddy Arroyo MD        enoxaparin (LOVENOX) injection 40 mg  40 mg Subcutaneous Daily Roddy Arroyo MD   40 mg at 02/10/21 0741    promethazine (PHENERGAN) tablet 12.5 mg  12.5 mg Oral Q6H PRN Roddy Arroyo MD        Or    ondansetron [facility]) injection 4 mg  4 mg Intravenous Q6H PRN Roddy Arroyo MD   4 mg at 02/08/21 1105    acetaminophen (TYLENOL) tablet 650 mg  650 mg Oral Q6H PRN Roddy Arroyo MD   650 mg at 02/06/21 0940    Or    acetaminophen (TYLENOL) suppository 650 mg  650 mg Rectal Q6H PRN Roddy Arroyo MD        senna (SENOKOT) tablet 17.2 mg  2 tablet Oral Nightly Roddy Arroyo MD   17.2 mg at 02/10/21 2048       PHYSICAL EXAM:    EYES:  Lids and lashes normal, pupils equal, round and reactive to light, extra ocular muscles intact, sclera clear, conjunctiva normal ENT:  Normocephalic, without obvious abnormality, atraumatic, sinuses nontender on palpation, external ears without lesions, oral pharynx with moist mucus membranes, tonsils without erythema or exudates, gums normal and good dentition. NECK:  Supple, symmetrical, trachea midline, no adenopathy, thyroid symmetric, not enlarged and no tenderness, skin normal    CHEST:    LUNGS:  No increased work of breathing, good air exchange, clear to auscultation bilaterally, no crackles or wheezing    CARDIOVASCULAR:  Normal apical impulse, regular rate and rhythm, normal S1 and S2, no S3 or S4, and no murmur noted    ABDOMEN:  No scars, normal bowel sounds, soft, non-distended, non-tender, no masses palpated, no hepatosplenomegally    MUSCULOSKELETAL:  There is no redness, warmth, or swelling of the joints. Full range of motion noted. Motor strength is 5 out of 5 all extremities bilaterally.   Tone is normal.  EXTREMITIES    NEURO:    DATA:      PT/INR:  No results found for: PTINR  PTT:  No results found for: APTT  CMP:    Lab Results   Component Value Date     02/11/2021    K 3.1 02/11/2021     02/11/2021    CO2 18 02/11/2021    BUN 29 02/11/2021    PROT 5.9 02/02/2021     Magnesium:    Lab Results   Component Value Date    MG 2.0 02/11/2021     Phosphorus:  No components found for: PO4  Calcium:  No components found for: CA  CBC:    Lab Results   Component Value Date    WBC 19.3 02/11/2021    RBC 3.24 02/11/2021    HGB 9.1 02/11/2021    HCT 28.2 02/11/2021    MCV 87.0 02/11/2021    RDW 13.8 02/11/2021     02/11/2021     DIFF:    Lab Results   Component Value Date    MCV 87.0 02/11/2021    RDW 13.8 02/11/2021      LDH:  No results found for: LDH  Uric Acid:  No components found for: URIC    EKG Reviewed  Appropriate Radiology Reviewed      Pathology: Reviewed where indicated      ASSESSMENT:  Active Problems:    Malignant ascites    Fatigue    Severe malnutrition ([address]  Resolved Problems:

## 2021-02-11 NOTE — PROGRESS NOTES
2100: Assessment complete. Alert and oriented calm and cooperative. VSS. No complaints of nausea, SOB, or dizziness. Patient stated \"pain wise I am pretty OK,\" rating her generalized discomfort 1/10. She has been repositioned and is resting comfortably. Safety maintained. Call light within reach. 0000: Flush performed per order. No residual noted. No complaints of nausea or pain. 0500: Lines emptied as follows: 80mL from YULISA-- serosanguinous; 20mL from ostomy-- green; 450 from ruffin-- clear, yellow. G tube flush performed per order with no residual noted. Patient denies nausea and pain. She is voicing concern over tube feed, stating \"I don't know how I can keep doing bottles and bottles of this. \" She has been repositioned and is resting comfortably.      Electronically signed by Winter Sweeney RN on 2/10/2021 at 9:34 PM

## 2021-02-11 NOTE — CARE COORDINATION
Met with pt and spouse at bedside. Discussed possible needs and options at DC. Currently pt plans to return to home with [facility] [place]. Continue to follow for possible equipment or SNF needs.

## 2021-02-11 NOTE — PROGRESS NOTES
Wound Ostomy Continence Nurse  Ostomy Referral Follow-up Progress Note      NAME:  Maxime Dickey  MEDICAL RECORD NUMBER:  [id number]  AGE: 69 y.o. GENDER:  female  :  1950  TODAY'S DATE:  2021    Subjective:    Maxime Dickey is a 69 y.o. female referred by:   [x] Physician  [] Nursing  [] Other:     Ileostomy and New    Objective    BP (!) 111/52   Pulse (!) 39   Temp 97.9 °F (36.6 °C) (Oral)   Resp 16   Ht 5' 5\" (1.651 m)   Wt 150 lb (68 kg)   SpO2 99%   BMI 24.96 kg/m²     Emmanuel Risk Score Emmanuel Scale Score: 20    Assessment   Surgeon - Dr. Carlo Ingram       Ileostomy Ileostomy RLQ (Active)   Stomal Appliance 1 piece; Changed 21   Flange Size (inches) 1.25 Inches 21 0910   Stoma  Assessment Moist;Red;Protrudes 21   Mucocutaneous Junction Intact 21   Peristomal Assessment Clean; Intact 21 0910   Treatment Bag change;Site care 21   Stool Color Red 21   Stool Appearance Watery 21   Stool Amount Small 21   Output (mL) 20 ml 21 0910   Number of days: 2 New ileostomy eduction lesson done today at bedside with patient and patient's  Heidi Hogan. Upon entering the room, patient is tearing and expresses anxiety. Patient ultimately states \"I just have a lot going on right now. \" Patient does want to continue with the education lesson planned for today. Full lesson provided. Patient and patient's  actively participated in the entire lesson. Patient may need reeducated, as several times in lesson, she would start crying and state \"this is to much. \" If patient doing home, [facility] would be beneficial. Patient's  did participate in lesson and offered the patient support during times of emotional distress. Patient and patient's  both able to demonstrate emptying the appliance by the end of the lesson. Patient encourage to participate in emptying the bag during the remainder of the admission. Demonstration of changing the ostomy appliance was also done at this time. Will continue to follow    Stoma in the LQU of the abdomen, red, moist, and protrudes slightly. Mucocutaneous junction is intact and peristomal skin is clean and intact. Ileostomy not functioning yet, small amount of serosanguinous drainage in the appliance at this time. Patient is tolerating clear liquids and tube feed so far.     Patient specific supplies and supplies list will be provided tomorrow    Intake/Output Summary (Last 24 hours) at 2/11/2021 1217  Last data filed at 2/11/2021 1156  Gross per 24 hour   Intake 4307 ml   Output 1680 ml   Net 2627 ml       Plan:   Plan for Ostomy Care: See above      Ostomy Plan of Care  [x] Supplies/Instructions left in room  [] Patient using home supplies  [x] Brand/supplies at bedside - coloplast    Current Diet: DIET CLEAR LIQUID;  Diet Tube Feed Continuous/Cyclic w/ Diet  Dietician consult:  Yes    Discharge Plan:  Placement for patient upon discharge: Undetermined     Outpatient visit plan No  Supplies given Yes   Samples requested N/A    Referrals: []   [] [facility]  [] Supplies  [] Other      Patient/Caregiver Teaching:  Written Instructions given to patient/family: patient and patient's   Teaching provided:  [x] Reviewed GI and A&P        [x] Supplies  [x] Pouch emptying      [x] Manipulate closure  [x] Routine Care         [] Comment  [x] Pouch maintenance           Level of patient/caregiver understanding able to:  [] Indicates understanding       [x] Needs reinforcement  [] Unsuccessful      [x] Verbal Understanding  [] Demonstrated understanding       [] No evidence of learning  [] Refused teaching         [] N/A    Electronically signed by Ian Moncada, YURIYN, RN, CWOCN on 2/11/2021 at 12:26 PM

## 2021-02-11 NOTE — PROGRESS NOTES
Assessment completed this shift. Alert and oriented x4. Zofran and Dilaudid given as ordered for c/o nausea and pain. 100  ML brown watery stool from ostomy. Dr Vandana Snow said ok to hold TF if nausea persists. In bed with call light in reach.  at bedside.   Electronically signed by Linsey Drummond RN on 2/11/2021 at 6:40 PM

## 2021-02-12 LAB
ANION GAP SERPL CALCULATED.3IONS-SCNC: 10 MEQ/L (ref 9–15)
BASOPHILS ABSOLUTE: 0 K/UL (ref 0–0.2)
BASOPHILS RELATIVE PERCENT: 0.1 %
BUN BLDV-MCNC: 23 MG/DL (ref 8–23)
CALCIUM SERPL-MCNC: 7.5 MG/DL (ref 8.5–9.9)
CHLORIDE BLD-SCNC: 109 MEQ/L (ref 95–107)
CO2: 21 MEQ/L (ref 20–31)
CREAT SERPL-MCNC: 0.79 MG/DL (ref 0.5–0.9)
EOSINOPHILS ABSOLUTE: 0.1 K/UL (ref 0–0.7)
EOSINOPHILS RELATIVE PERCENT: 0.3 %
GFR AFRICAN AMERICAN: >60
GFR NON-AFRICAN AMERICAN: >60
GLUCOSE BLD-MCNC: 100 MG/DL (ref 60–115)
GLUCOSE BLD-MCNC: 101 MG/DL (ref 60–115)
GLUCOSE BLD-MCNC: 108 MG/DL (ref 70–99)
GLUCOSE BLD-MCNC: 92 MG/DL (ref 60–115)
GLUCOSE BLD-MCNC: 99 MG/DL (ref 60–115)
HCT VFR BLD CALC: 29 % (ref 37–47)
HEMOGLOBIN: 9.7 G/DL (ref 12–16)
LYMPHOCYTES ABSOLUTE: 1 K/UL (ref 1–4.8)
LYMPHOCYTES RELATIVE PERCENT: 6 %
MAGNESIUM: 1.9 MG/DL (ref 1.7–2.4)
MCH RBC QN AUTO: 29 PG (ref 27–31.3)
MCHC RBC AUTO-ENTMCNC: 33.3 % (ref 33–37)
MCV RBC AUTO: 87 FL (ref 82–100)
MONOCYTES ABSOLUTE: 1.1 K/UL (ref 0.2–0.8)
MONOCYTES RELATIVE PERCENT: 6.6 %
NEUTROPHILS ABSOLUTE: 14.1 K/UL (ref 1.4–6.5)
NEUTROPHILS RELATIVE PERCENT: 87 %
PDW BLD-RTO: 13.8 % (ref 11.5–14.5)
PERFORMED ON: NORMAL
PLATELET # BLD: 277 K/UL (ref 130–400)
POTASSIUM REFLEX MAGNESIUM: 3.4 MEQ/L (ref 3.4–4.9)
RBC # BLD: 3.33 M/UL (ref 4.2–5.4)
SODIUM BLD-SCNC: 140 MEQ/L (ref 135–144)
WBC # BLD: 16.3 K/UL (ref 4.8–10.8)

## 2021-02-12 PROCEDURE — 99024 POSTOP FOLLOW-UP VISIT: CPT | Performed by: COLON & RECTAL SURGERY

## 2021-02-12 PROCEDURE — 6360000002 HC RX W HCPCS: Performed by: FAMILY MEDICINE

## 2021-02-12 PROCEDURE — 6360000002 HC RX W HCPCS: Performed by: INTERNAL MEDICINE

## 2021-02-12 PROCEDURE — 2580000003 HC RX 258: Performed by: COLON & RECTAL SURGERY

## 2021-02-12 PROCEDURE — 99214 OFFICE O/P EST MOD 30 MIN: CPT

## 2021-02-12 PROCEDURE — 6370000000 HC RX 637 (ALT 250 FOR IP): Performed by: FAMILY MEDICINE

## 2021-02-12 PROCEDURE — 6370000000 HC RX 637 (ALT 250 FOR IP): Performed by: COLON & RECTAL SURGERY

## 2021-02-12 PROCEDURE — 2580000003 HC RX 258: Performed by: INTERNAL MEDICINE

## 2021-02-12 PROCEDURE — 83735 ASSAY OF MAGNESIUM: CPT

## 2021-02-12 PROCEDURE — 36415 COLL VENOUS BLD VENIPUNCTURE: CPT

## 2021-02-12 PROCEDURE — 2580000003 HC RX 258: Performed by: FAMILY MEDICINE

## 2021-02-12 PROCEDURE — 85025 COMPLETE CBC W/AUTO DIFF WBC: CPT

## 2021-02-12 PROCEDURE — 97535 SELF CARE MNGMENT TRAINING: CPT

## 2021-02-12 PROCEDURE — 80048 BASIC METABOLIC PNL TOTAL CA: CPT

## 2021-02-12 PROCEDURE — 1210000000 HC MED SURG R&B

## 2021-02-12 PROCEDURE — 6360000002 HC RX W HCPCS: Performed by: COLON & RECTAL SURGERY

## 2021-02-12 RX ADMIN — LORAZEPAM 0.5 MG: 2 INJECTION INTRAMUSCULAR at 18:35

## 2021-02-12 RX ADMIN — Medication 10 ML: at 19:43

## 2021-02-12 RX ADMIN — HYDROMORPHONE HYDROCHLORIDE 0.5 MG: 1 INJECTION, SOLUTION INTRAMUSCULAR; INTRAVENOUS; SUBCUTANEOUS at 20:20

## 2021-02-12 RX ADMIN — PIPERACILLIN AND TAZOBACTAM 3375 MG: 3; .375 INJECTION, POWDER, LYOPHILIZED, FOR SOLUTION INTRAVENOUS at 15:06

## 2021-02-12 RX ADMIN — POTASSIUM BICARBONATE 40 MEQ: 782 TABLET, EFFERVESCENT ORAL at 09:57

## 2021-02-12 RX ADMIN — LORAZEPAM 0.5 MG: 2 INJECTION INTRAMUSCULAR at 03:58

## 2021-02-12 RX ADMIN — SENNOSIDES 17.2 MG: 8.6 TABLET, FILM COATED ORAL at 19:43

## 2021-02-12 RX ADMIN — PIPERACILLIN AND TAZOBACTAM 3375 MG: 3; .375 INJECTION, POWDER, LYOPHILIZED, FOR SOLUTION INTRAVENOUS at 23:53

## 2021-02-12 RX ADMIN — ENOXAPARIN SODIUM 40 MG: 40 INJECTION SUBCUTANEOUS at 10:03

## 2021-02-12 RX ADMIN — HYDROMORPHONE HYDROCHLORIDE 0.5 MG: 1 INJECTION, SOLUTION INTRAMUSCULAR; INTRAVENOUS; SUBCUTANEOUS at 13:28

## 2021-02-12 RX ADMIN — SODIUM CHLORIDE: 9 INJECTION, SOLUTION INTRAVENOUS at 10:07

## 2021-02-12 RX ADMIN — PANTOPRAZOLE SODIUM 40 MG: 40 TABLET, DELAYED RELEASE ORAL at 06:56

## 2021-02-12 RX ADMIN — PIPERACILLIN AND TAZOBACTAM 3375 MG: 3; .375 INJECTION, POWDER, LYOPHILIZED, FOR SOLUTION INTRAVENOUS at 03:47

## 2021-02-12 ASSESSMENT — PAIN SCALES - GENERAL
PAINLEVEL_OUTOF10: 6
PAINLEVEL_OUTOF10: 0

## 2021-02-12 ASSESSMENT — PAIN SCALES - WONG BAKER: WONGBAKER_NUMERICALRESPONSE: 0

## 2021-02-12 ASSESSMENT — PAIN DESCRIPTION - FREQUENCY: FREQUENCY: INTERMITTENT

## 2021-02-12 ASSESSMENT — PAIN DESCRIPTION - PAIN TYPE: TYPE: ACUTE PAIN;SURGICAL PAIN

## 2021-02-12 ASSESSMENT — PAIN DESCRIPTION - LOCATION: LOCATION: ABDOMEN

## 2021-02-12 NOTE — PROGRESS NOTES
Pain Frequency: Intermittent         OBJECTIVE   Orientation  Overall Orientation Status: Within Functional Limits    Bed mobility  Supine to Sit: Maximum assistance;2 Person assistance  Sit to Supine: Maximum assistance;2 Person assistance    Transfers  Sit to Stand: Minimal Assistance;2 Person Assistance  Stand to sit: Minimal Assistance;2 Person Assistance  Comment: pt tolerated standing and weight shifting but became dizzy. ASSESSMENT pt in a lot of pain but is agreeable to try to be mobile. Able to keep balance edge of bed with bilateral upper extremity support. Declined to stand with ww, stood with +2 hha. Became dizzy after a few minutes. Discharge Recommendations:  Continue to assess pending progress, Patient would benefit from continued therapy after discharge    Goals  Long term goals  Long term goal 1: Bed mobiity with supervision, demonstrating log roll  Long term goal 2: Functional transfers with supervision  Long term goal 3: Amb >/= 50ft with LRAD and supervision to navigate home home environment  Patient Goals   Patient goals : \"get better\"    PLAN    Times per week: 3-6        AMPAC (6 CLICK) BASIC MOBILITY  AM-PAC Inpatient Mobility Raw Score : 9     Therapy Time   Individual   Time In  1307   Time Out 1330   Minutes 23   15 minutes bed mobility  8 minutes transfer/static standing/seated          Arely Patel PTA, 02/12/21 at 1:30 PM         Definitions for assistance levels  Independent = pt does not require any physical supervision or assistance from another person for activity completion. Device may be needed.   Stand by assistance = pt requires verbal cues or instructions from another person, close to but not touching, to perform the activity  Minimal assistance= pt performs 75% or more of the activity; assistance is required to complete the activity  Moderate assistance= pt performs 50% of the activity; assistance is required to complete the activity Maximal assistance = pt performs 25% of the activity; assistance is required to complete the activity  Dependent = pt requires total physical assistance to accomplish the task

## 2021-02-12 NOTE — PROGRESS NOTES
Spiritual Care Services     Summary of Visit:  Pt coping well. Engaged in life review, conversation about illness, limitations, things he loves. Spiritual Assessment/Intervention/Outcomes:    Encounter Summary  Services provided to[de-identified] Patient  Referral/Consult From[de-identified] Rounding  Support System: Spouse, Children, Family members  Continue Visiting: No  Complexity of Encounter: Low  Length of Encounter: 15 minutes  Spiritual Assessment Completed: Yes  Routine  Type: Initial  Assessment: Approachable, Sleeping  Intervention: [place], Sustaining presence/ Ministry of presence  Outcome: Receptive     Spiritual/Advent  Type: Spiritual support  Assessment: Calm, Approachable  Intervention: Explored coping resources, Nurtured hope  Outcome: Engaged in conversation, Shared life review        Advance Directives (For Healthcare)  Pre-existing DNR Comfort Care/DNR Arrest/DNI Order: No  Healthcare Directive: No, patient does not have an advance directive for healthcare treatment  Information on Healthcare Directives Requested: No  Patient Requests Assistance: No  Advance Directives: Pt. not interested at this time           Values / Beliefs  Do you have any ethnic, cultural, sacramental, or spiritual [employer] needs you would like us to be aware of while you are in the hospital?: No    Care Plan:    Pt coping well, no further follow-up needed. Spiritual Care Services   Electronically signed by Ranjan Sutton on 2/12/21 at 11:36 AM EST     To reach a  for emotional and spiritual support, place an [facility] consult request.   If a  is needed immediately, dial 0 and ask to page the on-call .

## 2021-02-12 NOTE — PROGRESS NOTES
CURRENT VITALS:  height is 5' 5\" (1.651 m) and weight is 150 lb (68 kg). Her oral temperature is 98.2 °F (36.8 °C). Her blood pressure is 125/67 and her pulse is 78. Her respiration is 18 and oxygen saturation is 100%. Temperature Range (24h):Temp: 98.2 °F (36.8 °C) Temp  Av.2 °F (36.8 °C)  Min: 98.2 °F (36.8 °C)  Max: 98.2 °F (36.8 °C)  BP Range (29Z): Systolic (15OFR), GJF:153 , Min:125 , HHC:993     Diastolic (32HRU), IKQ:58, Min:67, Max:67    Pulse Range (24h): Pulse  Av  Min: 78  Max: 78  Respiration Range (24h): Resp  Av  Min: 18  Max: 18    GENERAL: alert, no distress  LUNGS: clear to ausculation, without wheezes, rales or rhonci  HEART: normal rate and regular rhythm  ABDOMEN: soft, non-tender, non-distended, ileostomy working, YULISA drain with serous fluid, bowel sounds present in all 4 quadrants and no guarding or peritoneal signs  EXTERMITY: no cyanosis, clubbing or edema  In:  [P.O.:320; I.V.:1115; NG/GT:543]  Out: 8814 [Urine:850; Drains:220]  Date 21 - 21   Shift 3881-2271 0732-9639 9774-7489 24 Hour Total   INTAKE   P.O.(mL/kg/hr) 320(0.6)   320   I. V.(mL/kg) [id number]) 429(6.3)  4805(70.2)   NG/GT(mL/kg) 256(3.8) 287(4.2)  543(8)   Shift Total(mL/kg) 2183(79.2) 716(10.5)  0949(76.9)   OUTPUT   Urine(mL/kg/hr) 850(1.6)   850   Drains(mL/kg) 140(2.1) 80(1.2)  220(3.2)   Stool(mL/kg) 250(3.7) 350(5.1)  600(8.8)   Shift Total(mL/kg) 4697(89.3) 430(6.3)  1670(24.5)   Weight (kg) 68 68 68 68       LABS  Recent Labs     02/10/21  0508 21  0643 21  0619   WBC 14.4* 19.3* 16.3*   HGB 9.2* 9.1* 9.7*   HCT 27.9* 28.2* 29.0*    246 277    138 140   K 3.5 3.1* 3.4   * 110* 109*   CO2 18* 18* 21   BUN 42* 29* 23   CREATININE 1.26* 0.94* 0.79   MG 1.3* 2.0 1.9   CALCIUM 7.0* 7.2* 7.5*      No results for input(s): PTT, INR in the last 72 hours.     Invalid input(s): PT No results for input(s): AST, ALT, BILITOT, BILIDIR, AMYLASE, LIPASE, LDH, LACTA in the last 72 hours. RADIOLOGY  Echocardiogram Complete 2d With Doppler With Color    Result Date: 2/3/2021  Transthoracic Echocardiography Report (TTE)  Demographics  Patient Name    Mk Michael Gender               Female  Patient Number  [id number]       Race                                                  Ethnicity  Visit Number    [id number]      Room Number          Z068  Corporate ID                   Date of Study        02/03/2021  Accession       [id number]     Referring Physician  Number  Date of Birth   1950     Sonographer          [address] [place], [place] [address]ellyn [facility]  Age             69 year(s)     Interpreting         [facility])                                 Physician            Cardiology                                                      Brice Logan MD Procedure Type of Study  TTE procedure:ECHO COMPLETE 2D W/DOP W/COLOR. Procedure Date Date: 02/03/2021 Start: 08:33 AM Study Location: Portable Technical Quality: Poor visualization due to poor acoustical window. Indications:LVF. Patient Status: Routine Height: 65 inches Weight: 153 pounds BSA: 1.77 m^2 BMI: 25.46 kg/m^2  Conclusions  Summary  Very limited and technically difficult study. LV Function appears to be preserved. will need repeat at later time.  when more stable  Signature  ----------------------------------------------------------------  Electronically signed by Brice Logan MD(Interpreting  physician) on 02/03/2021 12:07 PM  ----------------------------------------------------------------    Xr Abdomen (kub) (single Ap View)    Result Date: 2/4/2021 EXAMINATION: KUB CLINICAL HISTORY: No bowel movement for 2 weeks. COMPARISON :None FINDINGS: 3 view of the abdomen  submitted. There are multiply dilated and distended loops of large bowel. The transition point may be within the region of the distal descending colon and sigmoid. Bowel gas is seen within small bowel. 3 view of the abdomen shows bowel gas in both large and small bowel. DISTENDED DILATED LOOPS OF LARGE BOWEL WITH A TRANSITION POINT IN THE REGION OF THE DESCENDING COLON, SIGMOID. FINDINGS COULD SUGGEST THAT OF POSSIBLE HIGH-GRADE OBSTRUCTION. FURTHER EVALUATION IS WARRANTED. Us Pelvis Complete    Result Date: 2/11/2021  EXAMINATION:  US PELVIS COMPLETE CLINICAL HISTORY: Ascites. COMPARISONS:  NONE AVAILABLE TECHNIQUE:  Transabdominal ultrasound of the pelvis. FINDINGS:  The uterus is surgically absent. Both left and right ovaries are surgically absent. There is free fluid within the pelvis. FREE FLUID WITHIN THE PELVIS. EXAMINATION:  US DUP ABD PEL RETRO SCROT COMPLETE CLINICAL HISTORY: Abnormal CT scan. COMPARISONS:  NONE AVAILABLE TECHNIQUE:  Transabdominal ultrasound of the right upper quadrant with both duplex color ultrasound and spectral Doppler ultrasound with interrogation of the hepatic and portal venous system was performed. FINDINGS:  The visualized portion of the right lobe of the liver shows no focal parenchymal abnormalities. The hypoechoic area seen at the inferior medial aspect of the right lobe of liver on CT is not appreciated. Within the field-of-view there are findings of cholelithiasis. There is ascites present. The hepatic veins and portal veins are patent. Normal direction seen within the portal veins and hepatic vein. IMPRESSION:  1. ASCITES IS PRESENT WITHIN THE FIELD-OF-VIEW. 2. CHOLELITHIASIS. 3. UNREMARKABLE SONOGRAPHIC EXAMINATION OF THE HEPATIC AND PORTAL VEIN.     Ct Abdomen Pelvis W Iv Contrast Additional Contrast? None    Result Date: 2/1/2021 EXAM:  CT ABDOMEN PELVIS W IV CONTRAST History: Abdominal distention. Abdominal pain. Technique: Multiple contiguous axial images were obtained of the abdomen and pelvis from an level of the lung bases through the ischial tuberosities with IV contrast. Multiplanar reformats were obtained. Delayed images were obtained. Comparison: None available Findings: Small right pleural effusion. Bibasilar subsegmental atelectasis. Subtle nodularity of the liver. A 1 cm hypodense lesion within the right lobe of the liver is seen on axial series 2 image 29 and an ill-defined area of hypodensity within the inferior right lobe of the liver measuring approximately 2 cm is seen on axial  series 2 image 38. The gallbladder is physiologically distended and contains multiple small densities within the dependent portion. No gallbladder wall thickening is identified. The stomach, spleen, pancreas, and adrenal glands appear within normal limits. Small hiatal hernia. There is thickening and nodularity of the omentum. There is a large amount of ascites demonstrating simple fluid density. The kidneys enhance uniformly. There is mild right-sided hydronephrosis however no radiopaque or urinary tract calculi identified. No left-sided urinary tract calculi or hydronephrosis. Urinary bladder is suboptimally distended. There is mild perivesicular inflammation. The uterus is surgically absent Abdominal aorta is nonaneurysmal  . No retroperitoneal or abdominal/pelvic lymphadenopathy. No small bowel obstruction. No overt colonic mass or pericolonic inflammation although the large amount of ascites obscures evaluation for inflammation. There is a large amount of stool within the rectosigmoid colon. The appendix is not definitively visualized. No pneumoperitoneum or portal venous gas. No acute or aggressive osseous abnormality. Degenerative changes of the spine. Thickening and nodularity of the omentum concerning for peritoneal metastasis or peritonitis. A large amount of ascites is present. Two ill-defined lesions within the right lobe of the liver measuring 1 cm and 2 cm respectively are nonspecific but most concerning for metastatic lesions. Mild perivesicular inflammation. Correlation with urinalysis recommended to exclude cystitis. Large amount stool within the rectosigmoid colon may represent constipation and/or fecal impaction. Subtle nodularity of the liver suggests cirrhosis. Mild right-sided hydronephrosis without radiopaque calculus. Debris within the gallbladder likely represents gallstones and/or gallbladder sludge. Small right pleural effusion. All CT scans at this facility use dose modulation, iterative reconstruction, and/or weight based dosing when appropriate to reduce radiation dose to as low as reasonably achievable. Us Abdomen Limited    Result Date: 2/3/2021  CLINICAL INDICATION:   Patient with new ascites and abdominal pain, concern for metastatic disease COMPARISON: CT dating February 1, 2021 DISCUSSION:     Transverse and longitudinal images of the right upper quadrant of the abdomen were obtained with color Doppler interrogation of the hepatic vessels. The liver is normal  in echogenicity and no evidence of focal masses seen in the right liver lobe. The hepatic vein, and portal veins are patent. There is normal direction of blood flow in the portal veins towards the liver. The main portal vein, right portal vein, left portal vein, and hepatic vein are patent with normal direction. The common hepatic artery is patent with velocity measuring 72 cm/s. The gallbladder is seen with echogenic gallstones. The gallbladder wall measures 2.4 mm. The portal veins, hepatic vein, and common hepatic artery are patent with normal direction of blood flow. No lesions are seen in the right liver lobe, though ultrasound is limited, and if there is clinical suspicion, MRI of the liver can be obtained. Us Dup Abd Pel Retro Scrot Complete    Result Date: 2/11/2021  EXAMINATION:  US PELVIS COMPLETE CLINICAL HISTORY: Ascites. COMPARISONS:  NONE AVAILABLE TECHNIQUE:  Transabdominal ultrasound of the pelvis. FINDINGS:  The uterus is surgically absent. Both left and right ovaries are surgically absent. There is free fluid within the pelvis. FREE FLUID WITHIN THE PELVIS. EXAMINATION:  US DUP ABD PEL RETRO SCROT COMPLETE CLINICAL HISTORY: Abnormal CT scan. COMPARISONS:  NONE AVAILABLE TECHNIQUE:  Transabdominal ultrasound of the right upper quadrant with both duplex color ultrasound and spectral Doppler ultrasound with interrogation of the hepatic and portal venous system was performed. FINDINGS:  The visualized portion of the right lobe of the liver shows no focal parenchymal abnormalities. The hypoechoic area seen at the inferior medial aspect of the right lobe of liver on CT is not appreciated. Within the field-of-view there are findings of cholelithiasis. There is ascites present. The hepatic veins and portal veins are patent. Normal direction seen within the portal veins and hepatic vein. IMPRESSION:  1. ASCITES IS PRESENT WITHIN THE FIELD-OF-VIEW. 2. CHOLELITHIASIS. 3. UNREMARKABLE SONOGRAPHIC EXAMINATION OF THE HEPATIC AND PORTAL VEIN. Xr Chest Abdomen Ng Placement    Result Date: 2/4/2021  EXAMINATION: LOW CHEST HIGH KUB CLINICAL HISTORY: NG tube placement COMPARISON : The right fourth 2021 FINDINGS: Interval placement of nasogastric tube. The tip is within the stomach. Within the field-of-view there is still prominent dilated loops of large bowel. .     INTERVAL PLACEMENT OF NASOGASTRIC TUBE AS DESCRIBED ABOVE    Us Guided Paracentesis 1.  Status post technically successful ultrasound-guided paracentesis. Christiano Ozuna is a Female of 69 years age, referred for Ultrasound Guided Paracentesis. PROCEDURE: Survey of the abdomen showed large amount of ascites fluid. After obtaining informed consent, the patient was positioned supine on the sonography table. Using ultrasound, the skin over the left hemiabdomen was locally anesthetized with 1% lidocaine. Following that, a Yueh needle was advanced into the fluid pocket using ultrasound visualization. 4440cc, of clear yellow fluid were aspirated and sent for cytology, and pathology. The needle was removed, and hemostasis was obtained with pressure. A Band-Aid was placed. Post procedure images did not demonstrate hemorrhage at the target site. The patient tolerated the procedure well. The patient left the department in good condition. A radiology nurse was in presence monitoring vital signs, assisting throughout the procedure.      Fl Small Bowel Follow Through Only    Result Date: 2/7/2021 Small bowel follow-through. HISTORY: Postcolonoscopy. COMPARISON: CT abdomen pelvis, February 1, 2021. FINDINGS:  image shows a nasogastric tube in stomach. Marked diffuse dilatation of colon cecum sigmoid colon. 0 minute film shows administration of oral contrast medium via nasogastric tube with contrast medium filling in stomach. 30 minute image shows contrast medium within attenuated distal stomach. Contrast medium visualized filling the duodenum with loop of small bowel coursing into right lower quadrant. Contrast medium also fills normal caliber jejunal loops and epigastric area residual contrast medium from attempted barium enema identified sigmoid colon. 60 minute image shows contrast medium pooling within jejunal loops in the right lower quadrant, and contrast medium to a lesser degree filling jejunal loops found in the left perigastric region. 90 minute image shows mild progression of contrast medium into proximal and mid jejunal loops. Several loops shows little progression of contrast medium passed level of mid jejunum. 4 hour image shows contrast medium with bowel loops in left lower quadrant. However, contrast media now visualized overlying region of the ileocecal valve and portion of ascending colon. 6 hour image shows additional contrast medium entering ascending colon and now entering proximal transverse colon, splenic flexure, and proximal descending colon. There is marked evaluate attenuation of contrast medium within the small bowel. At 8.5 hours, there is contrast medium continues to empty from the small bowel, and now is layering, in the dilated ascending colon. Dilatation of the cecum, measuring approximately 13.7 cm is identified. Residual contrast medium still fills the rectum. Final imaging of 21.5 hours shows near complete evacuation of contrast medium from the small bowel, January dilatation seen to the transverse diameter 17.2 cm, and contrast medium filling the ascending colon.

## 2021-02-12 NOTE — PROGRESS NOTES
Comprehensive Nutrition Assessment    Type and Reason for Visit:  Reassess    Nutrition Recommendations/Plan:   Continue Standard with fiber formula (Jevity 1.2)  Increase rate as tolerated to a goal rate of 60 ml/hr. 140 ml water flush every 6 hrs. Recommend discontinue IVF. Once TF tolerated at goal rate, may transition to bolus schedule of  360 ml QID    Nutrition Assessment:  Severe malnutrition continues, tolerating TF at trophic rate. Will increase TF to goal rate to meet range of estimated energy/protein needs. Continue to monitor electrolytes and tolerance, recommend discontinue IVF . Once TF tolerated at goal rate, may transition to bolus schedule. Malnutrition Assessment:  Malnutrition Status:  Severe malnutrition    Context:  Acute Illness     Findings of the 6 clinical characteristics of malnutrition:  Energy Intake:  7 - 50% or less of estimated energy requirements for 5 or more days  Weight Loss:  7 - Greater than 5% over 1 month     Body Fat Loss:  1 - Mild body fat loss Buccal region, Orbital   Muscle Mass Loss:  1 - Mild muscle mass loss Temples (temporalis)  Fluid Accumulation:  Unable to assess     Strength:  Not Performed    Estimated Daily Nutrient Needs:  Energy (kcal):  5399-1562 (kg x 22-25); Weight Used for Energy Requirements:  Current(69.8 kg)     Protein (g):  73-79 gm (kg IBW x 1.3-1.4); Weight Used for Protein Requirements:  Ideal(56.8 kg)        Fluid (ml/day):  ~1700 ml; Method Used for Fluid Requirements:  1 ml/kcal      Nutrition Related Findings:  s/p bowel resection, colostomy, PEG tube placed (2/8). Appears frail and weak,  current weight relfects > 7% loss < 1 month, IVF: LR @ 50 ml/hr via peripheral line, stool via colostomy, abdomen noted to be soft;rounded. Labs reviewed- BUN/Cr,  now WNL,  Mg-1.9, No phos ordered, Gluc- 108.   PMH: Anxiety, renal stones      Wounds:  Surgical Incision       Current Nutrition Therapies:    DIET CLEAR LIQUID; Diet Tube Feed Continuous/Cyclic w/ Diet  Current Tube Feeding (TF) Orders:  · Feeding Route: PEG  · Formula: Standard w/Fiber (Jevity 1.2)  · Schedule: Continuous @ 60 ml/hr x 24 hrs  · Water Flushes: 140 ml every 4 hrs  · Current TF & Flush Orders Provides: 576 kcal, 26 gm protein, ~ ~985 ml free water  · Goal TF & Flush Orders Provides: 1728 kcal, 80 gm protein, ~1720 ml free water    Anthropometric Measures:  · Height: 5' 5\" (165.1 cm)  · Current Body Weight: 150 lb (68 kg)(2/5)   · Admission Body Weight: 160 lb (72.6 kg)(stated)    · Usual Body Weight: 165 lb (74.8 kg)(1/7/21)     · Ideal Body Weight: 125 lbs; % Ideal Body Weight  > 100%   · BMI: 25  · BMI Categories: Normal Weight (BMI 18.5-24. 9)       Nutrition Diagnosis:   · In context of acute illness or injury, Severe malnutrition related to inadequate protein-energy intake as evidenced by poor intake prior to admission, mild muscle loss, mild loss of subcutaneous fat, weight loss    Nutrition Interventions:   Food and/or Nutrient Delivery:  Modify Tube Feeding(Continue Standard with fiber formula (Jevity 1.2, increase rate as tolerated to a goal rate of 60 ml/hr. 140 ml water flush every 6 hrs. Recommend discontinue IVF.   Once TF tolerated at goal rate, may transition to bolus schedule (360 ml QID))  Nutrition Education/Counseling:  No recommendation at this time   Coordination of Nutrition Care:  Continue to monitor while inpatient    Goals:  New goal with initiation and tolerance to TF: EN to meet range of estimated energy/protein needs       Nutrition Monitoring and Evaluation:   Behavioral-Environmental Outcomes:      Food/Nutrient Intake Outcomes:  Enteral Nutrition Intake/Tolerance  Physical Signs/Symptoms Outcomes:  Biochemical Data, GI Status, Weight, Nutrition Focused Physical Findings     Discharge Planning:    Enteral Nutrition     Electronically signed by Coby Oliver RD, LD on 2/12/21 at 2:47 PM EST

## 2021-02-12 NOTE — PLAN OF CARE
Nutrition Problem #1: In context of acute illness or injury, Severe malnutrition  Intervention: Food and/or Nutrient Delivery: Modify Tube Feeding(Continue Standard with fiber formula (Jevity 1.2, increase rate as tolerated to a goal rate of 60 ml/hr. 140 ml water flush every 6 hrs. Recommend discontinue IVF.   Once TF tolerated at goal rate, may transition to bolus schedule (360 ml QID))  Nutritional Goals: New goal with initiation and tolerance to TF: EN to meet range of estimated energy/protein needs

## 2021-02-12 NOTE — PROGRESS NOTES
Wound Ostomy Continence Nurse  Ostomy Referral Follow-up Progress Note      NAME:  Luis Manuel Bermudez  MEDICAL RECORD NUMBER:  [id number]  AGE: 69 y.o. GENDER:  female  :  1950  TODAY'S DATE:  2021    Subjective:    Luis Manuel Bermudez is a 69 y.o. female referred by:   [x] Physician  [] Nursing  [] Other:     ileostomy and New    Objective    /67   Pulse 78   Temp 98.2 °F (36.8 °C) (Oral)   Resp 18   Ht 5' 5\" (1.651 m)   Wt 150 lb (68 kg)   SpO2 100%   BMI 24.96 kg/m²     Emmanuel Risk Score Emmanuel Scale Score: 20    Assessment   Surgeon - Dr. Sahil West       Ileostomy Ileostomy RLQ (Active)   Stomal Appliance 1 piece;Clean;Dry; Intact 21   Flange Size (inches) 1.25 Inches 21   Stoma  Assessment Moist;Red;Protrudes 21   Mucocutaneous Junction Intact 21   Peristomal Assessment Clean; Intact 21   Treatment Bag change;Site care 21   Stool Color Brown 21   Stool Appearance Watery 21   Stool Amount Small 21   Output (mL) 250 ml 21 0352   Number of days: 3     Follow up with patient and patient's  today, both deny any questions about ileostomy education lesson provided yesterday. Patient does explain that she has a lot of anxiety in general and she is bothered by the hiccups she is having (I was in the room for over 10 minutes and patient did not hiccup). No change in stoma appearance and appliance remains clean and intact. Ileostomy is functioning, small amount of brown effluent as well as some gas in the bag. Patient denies n/v.    Patient specific supplies list as well as more supplies provided for patient at this time incase she is discharged over the weekend. Discharge plan is still to be determined.        Intake/Output Summary (Last 24 hours) at 2021 1206  Last data filed at 2021 0656  Gross per 24 hour   Intake 2651 ml Output 3040 ml   Net -389 ml       Plan:   Plan for Ostomy Care: See above    Ostomy Plan of Care  [x] Supplies/Instructions left in room  [] Patient using home supplies  [x] Brand/supplies at bedside - [employer] Sensura Xpro    Current Diet: DIET CLEAR LIQUID;  Diet Tube Feed Continuous/Cyclic w/ Diet  Dietician consult:  Yes    Discharge Plan:  Placement for patient upon discharge: home with support vs snf  Outpatient visit plan No  Supplies given Yes   Samples requested Yes    Referrals:  []   [] [facility]  [] Supplies  [] Other      Patient/Caregiver Teaching:  Written Instructions given to patient/family:Patient and patient's   Teaching provided:  [] Reviewed GI and A&P        [x] Supplies  [x] Pouch emptying      [x] Manipulate closure  [x] Routine Care         [] Comment  [x] Pouch maintenance           Level of patient/caregiver understanding able to:  [] Indicates understanding       [] Needs reinforcement  [] Unsuccessful      [x] Verbal Understanding  [] Demonstrated understanding       [] No evidence of learning  [] Refused teaching         [] N/A    Electronically signed by YURIY McmillanN, RN, CWOCN on 2/12/2021 at 12:17 PM

## 2021-02-12 NOTE — PROGRESS NOTES
Hospitalist Progress Note      PCP: Gregory Ferguson MD    Date of Admission: 2/1/2021    Chief Complaint:    Chief Complaint   Patient presents with    Fatigue     sent by dr Db Bal for dehydration work up , patient has been vomiting and not keeping anything down and is very fatigued     Subjective:  Patient resting in bed, no cp, sob. Fatigued postop. Medications:  Reviewed    Infusion Medications    sodium chloride 50 mL/hr at 02/11/21 1650    dextrose       Scheduled Medications    potassium bicarb-citric acid  40 mEq Per G Tube Daily    sodium chloride flush  10 mL Intravenous 2 times per day    piperacillin-tazobactam  3,375 mg Intravenous Q8H    [Held by provider] insulin glargine  10 Units Subcutaneous Nightly    insulin lispro  0-6 Units Subcutaneous Q6H    pantoprazole  40 mg Oral QAM AC    [Held by provider] spironolactone  50 mg Oral Daily    [Held by provider] furosemide  20 mg Oral Daily    sodium chloride flush  10 mL Intravenous 2 times per day    enoxaparin  40 mg Subcutaneous Daily    senna  2 tablet Oral Nightly     PRN Meds: metoclopramide, LORazepam, HYDROmorphone, sodium chloride flush, glucose, dextrose, glucagon (rDNA), dextrose, potassium chloride, magnesium sulfate, sodium phosphate IVPB **OR** sodium phosphate IVPB, benzocaine, sodium chloride flush, promethazine **OR** ondansetron, acetaminophen **OR** acetaminophen      Intake/Output Summary (Last 24 hours) at 2/12/2021 0204  Last data filed at 2/11/2021 1836  Gross per 24 hour   Intake 3168 ml   Output 2400 ml   Net 768 ml     Exam:    BP (!) 111/52   Pulse (!) 39   Temp 97.9 °F (36.6 °C) (Oral)   Resp 16   Ht 5' 5\" (1.651 m)   Wt 150 lb (68 kg)   SpO2 99%   BMI 24.96 kg/m²     General appearance: NAD  HEENT: Conjunctivae/corneas clear. Neck:  Trachea midline. Respiratory:  Normal respiratory effort.  Clear to auscultation  Cardiovascular: Regular rate and rhythm   Abdomen: Well wrapped, no drainage CLINICAL HISTORY: Abnormal CT scan. COMPARISONS:  NONE AVAILABLE      TECHNIQUE:  Transabdominal ultrasound of the right upper quadrant with both duplex color ultrasound and spectral Doppler ultrasound with interrogation of the hepatic and portal venous system was performed. FINDINGS:     The visualized portion of the right lobe of the liver shows no focal parenchymal abnormalities. The hypoechoic area seen at the inferior medial aspect of the right lobe of liver on CT is not appreciated. Within the field-of-view there are findings of cholelithiasis. There is ascites present. The hepatic veins and portal veins are patent. Normal direction seen within the portal veins and hepatic vein. IMPRESSION:        1. ASCITES IS PRESENT WITHIN THE FIELD-OF-VIEW. 2. CHOLELITHIASIS. 3. UNREMARKABLE SONOGRAPHIC EXAMINATION OF THE HEPATIC AND PORTAL VEIN. US DUP ABD PEL RETRO SCROT COMPLETE   Final Result   FREE FLUID WITHIN THE PELVIS. EXAMINATION:  US DUP ABD PEL RETRO SCROT COMPLETE      CLINICAL HISTORY: Abnormal CT scan. COMPARISONS:  NONE AVAILABLE      TECHNIQUE:  Transabdominal ultrasound of the right upper quadrant with both duplex color ultrasound and spectral Doppler ultrasound with interrogation of the hepatic and portal venous system was performed. FINDINGS:     The visualized portion of the right lobe of the liver shows no focal parenchymal abnormalities. The hypoechoic area seen at the inferior medial aspect of the right lobe of liver on CT is not appreciated. Within the field-of-view there are findings of cholelithiasis. There is ascites present. The hepatic veins and portal veins are patent. Normal direction seen within the portal veins and hepatic vein. IMPRESSION:        1. ASCITES IS PRESENT WITHIN THE FIELD-OF-VIEW. 2. CHOLELITHIASIS. 3. UNREMARKABLE SONOGRAPHIC EXAMINATION OF THE HEPATIC AND PORTAL VEIN.       [address] Final Result   1. Status post technically successful ultrasound-guided paracentesis. José Luis Somers is a Female of 69 years age, referred for Ultrasound Guided Paracentesis. PROCEDURE: Survey of the abdomen showed large amount of ascites fluid. After obtaining informed consent, the patient was positioned supine on the sonography table. Using ultrasound, the skin over the left hemiabdomen was locally anesthetized with 1% lidocaine. Following that, a Yueh needle was advanced into the fluid    pocket using ultrasound visualization. 4440cc, of clear yellow fluid were aspirated and sent for cytology, and pathology. The needle was removed, and hemostasis was obtained with pressure. A Band-Aid was placed. Post procedure images did not demonstrate hemorrhage at the target site. The patient tolerated the procedure well. The patient left the department in good condition. A radiology nurse was in presence monitoring vital signs, assisting throughout the procedure. US ABDOMEN LIMITED   Final Result   The portal veins, hepatic vein, and common hepatic artery are patent with normal direction of blood flow. No lesions are seen in the right liver lobe, though ultrasound is limited, and if there is clinical suspicion, MRI of the liver can be    obtained. CT ABDOMEN PELVIS W IV CONTRAST Additional Contrast? None   Final Result      Thickening and nodularity of the omentum concerning for peritoneal metastasis or peritonitis. A large amount of ascites is present. Two ill-defined lesions within the right lobe of the liver measuring 1 cm and 2 cm respectively are nonspecific but most concerning for metastatic lesions. Mild perivesicular inflammation. Correlation with urinalysis recommended to exclude cystitis. Large amount stool within the rectosigmoid colon may represent constipation and/or fecal impaction. Subtle nodularity of the liver suggests cirrhosis. Mild right-sided hydronephrosis without radiopaque calculus. Debris within the gallbladder likely represents gallstones and/or gallbladder sludge. Small right pleural effusion. All CT scans at this facility use dose modulation, iterative reconstruction, and/or weight based dosing when appropriate to reduce radiation dose to as low as reasonably achievable. Assessment/Plan:    1. 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 Possibly pancreatic vs cholangiocarcinoma based on CA 19-9; plan for MRCP when more stable; other sources being worked up by oncology  2. High grade obstruction of descending colon: Will need colectomy; patient is deciding; if she is agreeable then plan for OR tomorrow at 3pm  1. 2/8 - s/p colectomy with diverting ostomy, moved to icu for extensive procedure post op care. 2. 2/9 - pod 1, sips, cont post op care per surgery; ICU as required levophed for post op hypotension  3. 2/10 - pod 2, cont routine care, clears, tube feeds, monitor drain output  4. 2/11 - tube feeds per dietary  3. Hyponatremia/BALA  1. 2/9 - both improved with iv fluids  4. Right sided pleural effusion:  Likely from the ascites; ascites drained  5. Functional Status: Fall precautions. Up with assistance. PT OT  6. Diet: NPO  7. DVT ppx: Lovenox SCDs  8. Disposition: Dependent on hospital course. Will discharge once medically stable. SW on board for discharge planning.    1. 2/11 - await final nutrition plan post-op, dc planning soon    Active Hospital Problems    Diagnosis Date Noted    Severe malnutrition ([address] [E43] 02/10/2021    Fatigue [R53.83]     Malignant ascites [R18.0] 02/01/2021 Additional work up or/and treatment plan may be added today or then after based on clinical progression. I am managing a portion of pt care. Some medical issues are handled by other specialists. Additional work up and treatment should be done in out pt setting by pt PCP and other out pt providers. In addition to examining and evaluating pt, I spent additional time explaining care, normal and abnormal findings, and treatment plan. All of pt questions were answered. Counseling, diet and education were  provided. Case will be discussed with nursing staff when appropriate. Family will be updated if and when appropriate.       Diet: DIET CLEAR LIQUID;  Diet Tube Feed Continuous/Cyclic w/ Diet    Code Status: Full Code    PT/OT Eval     Electronically signed by Helen Gifford MD on 2/12/2021 at 2:04 AM

## 2021-02-13 LAB
ANION GAP SERPL CALCULATED.3IONS-SCNC: 8 MEQ/L (ref 9–15)
ANISOCYTOSIS: ABNORMAL
BANDED NEUTROPHILS RELATIVE PERCENT: 2 % (ref 5–11)
BASOPHILS ABSOLUTE: 0 K/UL (ref 0–0.2)
BASOPHILS RELATIVE PERCENT: 0.1 %
BUN BLDV-MCNC: 16 MG/DL (ref 8–23)
CALCIUM SERPL-MCNC: 7.3 MG/DL (ref 8.5–9.9)
CHLORIDE BLD-SCNC: 111 MEQ/L (ref 95–107)
CO2: 21 MEQ/L (ref 20–31)
CREAT SERPL-MCNC: 0.64 MG/DL (ref 0.5–0.9)
EOSINOPHILS ABSOLUTE: 0.1 K/UL (ref 0–0.7)
EOSINOPHILS RELATIVE PERCENT: 1 %
GFR AFRICAN AMERICAN: >60
GFR NON-AFRICAN AMERICAN: >60
GLUCOSE BLD-MCNC: 115 MG/DL (ref 60–115)
GLUCOSE BLD-MCNC: 125 MG/DL (ref 60–115)
GLUCOSE BLD-MCNC: 127 MG/DL (ref 60–115)
GLUCOSE BLD-MCNC: 128 MG/DL (ref 60–115)
GLUCOSE BLD-MCNC: 130 MG/DL (ref 70–99)
GLUCOSE BLD-MCNC: 136 MG/DL (ref 60–115)
GLUCOSE BLD-MCNC: 172 MG/DL (ref 60–115)
HCT VFR BLD CALC: 29.9 % (ref 37–47)
HEMOGLOBIN: 9.7 G/DL (ref 12–16)
LYMPHOCYTES ABSOLUTE: 0.3 K/UL (ref 1–4.8)
LYMPHOCYTES RELATIVE PERCENT: 2 %
MCH RBC QN AUTO: 28.4 PG (ref 27–31.3)
MCHC RBC AUTO-ENTMCNC: 32.4 % (ref 33–37)
MCV RBC AUTO: 87.7 FL (ref 82–100)
METAMYELOCYTES RELATIVE PERCENT: 3 %
MONOCYTES ABSOLUTE: 0.4 K/UL (ref 0.2–0.8)
MONOCYTES RELATIVE PERCENT: 2.8 %
NEUTROPHILS ABSOLUTE: 13.4 K/UL (ref 1.4–6.5)
NEUTROPHILS RELATIVE PERCENT: 90 %
PDW BLD-RTO: 14.2 % (ref 11.5–14.5)
PERFORMED ON: ABNORMAL
PERFORMED ON: NORMAL
PLATELET # BLD: 303 K/UL (ref 130–400)
PLATELET SLIDE REVIEW: ADEQUATE
POIKILOCYTES: ABNORMAL
POTASSIUM REFLEX MAGNESIUM: 3.9 MEQ/L (ref 3.4–4.9)
RBC # BLD: 3.41 M/UL (ref 4.2–5.4)
SODIUM BLD-SCNC: 140 MEQ/L (ref 135–144)
WBC # BLD: 14.1 K/UL (ref 4.8–10.8)

## 2021-02-13 PROCEDURE — 2580000003 HC RX 258: Performed by: FAMILY MEDICINE

## 2021-02-13 PROCEDURE — 36415 COLL VENOUS BLD VENIPUNCTURE: CPT

## 2021-02-13 PROCEDURE — 85025 COMPLETE CBC W/AUTO DIFF WBC: CPT

## 2021-02-13 PROCEDURE — 1210000000 HC MED SURG R&B

## 2021-02-13 PROCEDURE — 80048 BASIC METABOLIC PNL TOTAL CA: CPT

## 2021-02-13 PROCEDURE — 99024 POSTOP FOLLOW-UP VISIT: CPT | Performed by: SURGERY

## 2021-02-13 PROCEDURE — 6370000000 HC RX 637 (ALT 250 FOR IP): Performed by: COLON & RECTAL SURGERY

## 2021-02-13 PROCEDURE — 6360000002 HC RX W HCPCS: Performed by: COLON & RECTAL SURGERY

## 2021-02-13 PROCEDURE — 6360000002 HC RX W HCPCS: Performed by: FAMILY MEDICINE

## 2021-02-13 PROCEDURE — 97535 SELF CARE MNGMENT TRAINING: CPT

## 2021-02-13 PROCEDURE — 6360000002 HC RX W HCPCS: Performed by: INTERNAL MEDICINE

## 2021-02-13 PROCEDURE — 2580000003 HC RX 258: Performed by: INTERNAL MEDICINE

## 2021-02-13 RX ADMIN — HYDROMORPHONE HYDROCHLORIDE 0.5 MG: 1 INJECTION, SOLUTION INTRAMUSCULAR; INTRAVENOUS; SUBCUTANEOUS at 00:30

## 2021-02-13 RX ADMIN — HYDROMORPHONE HYDROCHLORIDE 0.5 MG: 1 INJECTION, SOLUTION INTRAMUSCULAR; INTRAVENOUS; SUBCUTANEOUS at 21:39

## 2021-02-13 RX ADMIN — ENOXAPARIN SODIUM 40 MG: 40 INJECTION SUBCUTANEOUS at 09:07

## 2021-02-13 RX ADMIN — Medication 10 ML: at 09:07

## 2021-02-13 RX ADMIN — SODIUM CHLORIDE: 9 INJECTION, SOLUTION INTRAVENOUS at 05:23

## 2021-02-13 RX ADMIN — LORAZEPAM 0.5 MG: 2 INJECTION INTRAMUSCULAR at 12:26

## 2021-02-13 RX ADMIN — HYDROMORPHONE HYDROCHLORIDE 0.5 MG: 1 INJECTION, SOLUTION INTRAMUSCULAR; INTRAVENOUS; SUBCUTANEOUS at 05:23

## 2021-02-13 RX ADMIN — PIPERACILLIN AND TAZOBACTAM 3375 MG: 3; .375 INJECTION, POWDER, LYOPHILIZED, FOR SOLUTION INTRAVENOUS at 14:57

## 2021-02-13 RX ADMIN — HYDROMORPHONE HYDROCHLORIDE 0.5 MG: 1 INJECTION, SOLUTION INTRAMUSCULAR; INTRAVENOUS; SUBCUTANEOUS at 17:16

## 2021-02-13 RX ADMIN — SODIUM CHLORIDE: 9 INJECTION, SOLUTION INTRAVENOUS at 22:44

## 2021-02-13 RX ADMIN — PIPERACILLIN AND TAZOBACTAM 3375 MG: 3; .375 INJECTION, POWDER, LYOPHILIZED, FOR SOLUTION INTRAVENOUS at 22:42

## 2021-02-13 RX ADMIN — ONDANSETRON 4 MG: 2 INJECTION INTRAMUSCULAR; INTRAVENOUS at 00:30

## 2021-02-13 RX ADMIN — METOCLOPRAMIDE HYDROCHLORIDE 5 MG: 5 INJECTION INTRAMUSCULAR; INTRAVENOUS at 14:57

## 2021-02-13 RX ADMIN — SENNOSIDES 17.2 MG: 8.6 TABLET, FILM COATED ORAL at 19:54

## 2021-02-13 RX ADMIN — PIPERACILLIN AND TAZOBACTAM 3375 MG: 3; .375 INJECTION, POWDER, LYOPHILIZED, FOR SOLUTION INTRAVENOUS at 09:07

## 2021-02-13 ASSESSMENT — PAIN SCALES - GENERAL
PAINLEVEL_OUTOF10: 4
PAINLEVEL_OUTOF10: 6
PAINLEVEL_OUTOF10: 4

## 2021-02-13 NOTE — PROGRESS NOTES
Hospitalist Progress Note      PCP: Floyd Moran MD    Date of Admission: 2/1/2021    Chief Complaint:    Chief Complaint   Patient presents with    Fatigue     sent by dr Edgar Seymour for dehydration work up , patient has been vomiting and not keeping anything down and is very fatigued     Subjective:  Patient resting in bed, no cp, sob. Fatigued postop. Medications:  Reviewed    Infusion Medications    sodium chloride 50 mL/hr at 02/13/21 0523    dextrose       Scheduled Medications    sodium chloride flush  10 mL Intravenous 2 times per day    piperacillin-tazobactam  3,375 mg Intravenous Q8H    [Held by provider] insulin glargine  10 Units Subcutaneous Nightly    insulin lispro  0-6 Units Subcutaneous Q6H    pantoprazole  40 mg Oral QAM AC    [Held by provider] spironolactone  50 mg Oral Daily    [Held by provider] furosemide  20 mg Oral Daily    sodium chloride flush  10 mL Intravenous 2 times per day    enoxaparin  40 mg Subcutaneous Daily    senna  2 tablet Oral Nightly     PRN Meds: metoclopramide, LORazepam, HYDROmorphone, sodium chloride flush, glucose, dextrose, glucagon (rDNA), dextrose, potassium chloride, magnesium sulfate, sodium phosphate IVPB **OR** sodium phosphate IVPB, benzocaine, sodium chloride flush, promethazine **OR** ondansetron, acetaminophen **OR** acetaminophen      Intake/Output Summary (Last 24 hours) at 2/13/2021 1825  Last data filed at 2/13/2021 1722  Gross per 24 hour   Intake 2607 ml   Output 1990 ml   Net 617 ml     Exam:    /67   Pulse 88   Temp 97.7 °F (36.5 °C) (Oral)   Resp 18   Ht 5' 5\" (1.651 m)   Wt 150 lb (68 kg)   SpO2 100%   BMI 24.96 kg/m²     General appearance: NAD  HEENT: Conjunctivae/corneas clear. Neck:  Trachea midline. Respiratory:  Normal respiratory effort. Clear to auscultation  Cardiovascular: Regular rate and rhythm   Abdomen: Well wrapped, no drainage  Musculoskeletal: No clubbing, cyanosis or edema bilaterally. Neuro: Non Focal  Capillary Refill: Brisk,< 3 seconds   Peripheral Pulses: +2 palpable, equal bilaterally     Labs:   Recent Labs     02/11/21  0643 02/12/21  0619 02/13/21  0628   WBC 19.3* 16.3* 14.1*   HGB 9.1* 9.7* 9.7*   HCT 28.2* 29.0* 29.9*    277 303     Recent Labs     02/11/21  0643 02/12/21  0619 02/13/21 0628    140 140   K 3.1* 3.4 3.9   * 109* 111*   CO2 18* 21 21   BUN 29* 23 16   CREATININE 0.94* 0.79 0.64   CALCIUM 7.2* 7.5* 7.3*     No results for input(s): AST, ALT, BILIDIR, BILITOT, ALKPHOS in the last 72 hours. No results for input(s): INR in the last 72 hours. No results for input(s): Radha [place] in the last 72 hours. Urinalysis:      Lab Results   Component Value Date    NITRU Negative 02/01/2021    BLOODU Negative 02/01/2021    SPECGRAV 1.065 02/01/2021    GLUCOSEU Negative 02/01/2021     Radiology:  FL BARIUM ENEMA   Final Result   Incomplete barium enema. Patient unable to tolerate procedure. There is diffuse dilation of the bowel. FL SMALL BOWEL FOLLOW THROUGH ONLY   Final Result      Dilated small bowel loops, with persistent delay at level of proximal jejunum. Contrast medium visualized ileocecal valve at 4 hours. Following remains air-filled and dilated at that time. Findings suggest stenosis/partial obstruction at level of distal    colon and at level of mid jejunum. XR CHEST ABDOMEN NG PLACEMENT   Final Result   INTERVAL PLACEMENT OF NASOGASTRIC TUBE AS DESCRIBED ABOVE      XR ABDOMEN (KUB) (SINGLE AP VIEW)   Final Result   DISTENDED DILATED LOOPS OF LARGE BOWEL WITH A TRANSITION POINT IN THE REGION OF THE DESCENDING COLON, SIGMOID. FINDINGS COULD SUGGEST THAT OF POSSIBLE HIGH-GRADE OBSTRUCTION. FURTHER EVALUATION IS WARRANTED. US PELVIS COMPLETE   Final Result   FREE FLUID WITHIN THE PELVIS. EXAMINATION:  US DUP ABD PEL RETRO SCROT COMPLETE      CLINICAL HISTORY: Abnormal CT scan.       COMPARISONS:  NONE AVAILABLE TECHNIQUE:  Transabdominal ultrasound of the right upper quadrant with both duplex color ultrasound and spectral Doppler ultrasound with interrogation of the hepatic and portal venous system was performed. FINDINGS:     The visualized portion of the right lobe of the liver shows no focal parenchymal abnormalities. The hypoechoic area seen at the inferior medial aspect of the right lobe of liver on CT is not appreciated. Within the field-of-view there are findings of cholelithiasis. There is ascites present. The hepatic veins and portal veins are patent. Normal direction seen within the portal veins and hepatic vein. IMPRESSION:        1. ASCITES IS PRESENT WITHIN THE FIELD-OF-VIEW. 2. CHOLELITHIASIS. 3. UNREMARKABLE SONOGRAPHIC EXAMINATION OF THE HEPATIC AND PORTAL VEIN. US DUP ABD PEL RETRO SCROT COMPLETE   Final Result   FREE FLUID WITHIN THE PELVIS. EXAMINATION:  US DUP ABD PEL RETRO SCROT COMPLETE      CLINICAL HISTORY: Abnormal CT scan. COMPARISONS:  NONE AVAILABLE      TECHNIQUE:  Transabdominal ultrasound of the right upper quadrant with both duplex color ultrasound and spectral Doppler ultrasound with interrogation of the hepatic and portal venous system was performed. FINDINGS:     The visualized portion of the right lobe of the liver shows no focal parenchymal abnormalities. The hypoechoic area seen at the inferior medial aspect of the right lobe of liver on CT is not appreciated. Within the field-of-view there are findings of cholelithiasis. There is ascites present. The hepatic veins and portal veins are patent. Normal direction seen within the portal veins and hepatic vein. IMPRESSION:        1. ASCITES IS PRESENT WITHIN THE FIELD-OF-VIEW. 2. CHOLELITHIASIS. 3. UNREMARKABLE SONOGRAPHIC EXAMINATION OF THE HEPATIC AND PORTAL VEIN.       US GUIDED PARACENTESIS   Final Result 1.  Status post technically successful ultrasound-guided paracentesis. Dionne Echols is a Female of 69 years age, referred for Ultrasound Guided Paracentesis. PROCEDURE: Survey of the abdomen showed large amount of ascites fluid. After obtaining informed consent, the patient was positioned supine on the sonography table. Using ultrasound, the skin over the left hemiabdomen was locally anesthetized with 1% lidocaine. Following that, a Yueh needle was advanced into the fluid    pocket using ultrasound visualization. 4440cc, of clear yellow fluid were aspirated and sent for cytology, and pathology. The needle was removed, and hemostasis was obtained with pressure. A Band-Aid was placed. Post procedure images did not demonstrate hemorrhage at the target site. The patient tolerated the procedure well. The patient left the department in good condition. A radiology nurse was in presence monitoring vital signs, assisting throughout the procedure. US ABDOMEN LIMITED   Final Result   The portal veins, hepatic vein, and common hepatic artery are patent with normal direction of blood flow. No lesions are seen in the right liver lobe, though ultrasound is limited, and if there is clinical suspicion, MRI of the liver can be    obtained. CT ABDOMEN PELVIS W IV CONTRAST Additional Contrast? None   Final Result      Thickening and nodularity of the omentum concerning for peritoneal metastasis or peritonitis. A large amount of ascites is present. Two ill-defined lesions within the right lobe of the liver measuring 1 cm and 2 cm respectively are nonspecific but most concerning for metastatic lesions. Mild perivesicular inflammation. Correlation with urinalysis recommended to exclude cystitis. Large amount stool within the rectosigmoid colon may represent constipation and/or fecal impaction. Subtle nodularity of the liver suggests cirrhosis. Mild right-sided hydronephrosis without radiopaque calculus. Debris within the gallbladder likely represents gallstones and/or gallbladder sludge. Small right pleural effusion. All CT scans at this facility use dose modulation, iterative reconstruction, and/or weight based dosing when appropriate to reduce radiation dose to as low as reasonably achievable. Assessment/Plan:    1. 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 Possibly pancreatic vs cholangiocarcinoma based on CA 19-9; plan for MRCP when more stable; other sources being worked up by oncology  2. High grade obstruction of descending colon: Will need colectomy; patient is deciding; if she is agreeable then plan for OR tomorrow at 3pm  1. 2/8 - s/p colectomy with diverting ostomy, moved to icu for extensive procedure post op care. 2. 2/9 - pod 1, sips, cont post op care per surgery; ICU as required levophed for post op hypotension  3. 2/10 - pod 2, cont routine care, clears, tube feeds, monitor drain output  4. 2/11 - tube feeds per dietary  5. 2/12 - cont tube feeds per surgery   3. Hyponatremia/BALA  1. 2/9 - both improved with iv fluids  4. Right sided pleural effusion:  Likely from the ascites; ascites drained  5. Functional Status: Fall precautions. Up with assistance. PT OT  6. Diet: NPO  7. DVT ppx: Lovenox SCDs  8. Disposition: Dependent on hospital course. Will discharge once medically stable. SW on board for discharge planning.    1. 2/11 - await final nutrition plan post-op, dc planning soon  2. 2/12 - planning for dc home 1-2 days once home care planned    Active Hospital Problems    Diagnosis Date Noted    Severe malnutrition ([address] [E43] 02/10/2021    Fatigue [R53.83]     Malignant ascites [R18.0] 02/01/2021 Additional work up or/and treatment plan may be added today or then after based on clinical progression. I am managing a portion of pt care. Some medical issues are handled by other specialists. Additional work up and treatment should be done in out pt setting by pt PCP and other out pt providers. In addition to examining and evaluating pt, I spent additional time explaining care, normal and abnormal findings, and treatment plan. All of pt questions were answered. Counseling, diet and education were  provided. Case will be discussed with nursing staff when appropriate. Family will be updated if and when appropriate.       Diet: DIET CLEAR LIQUID;  Diet Tube Feed Continuous/Cyclic w/ Diet    Code Status: Full Code    PT/OT Eval     Electronically signed by Lianet Wong MD on 2/13/2021 at 6:25 PM

## 2021-02-13 NOTE — PROGRESS NOTES
1930: Assessment complete. Alert and oriented, calm and cooperative. VSS. No complaints of nausea, SOB, dizziness, or pain-- denying any pain or nausea medications at this time. G tube flushed per order with no residual noted. Tube feed continues to run at 60mL (goal.) Patient is a Q2 turn, repositioned for comfort. She denies further needs at this time. Safety maintained. Bed alarm on. Call light within reach. 2020: Patient complaining of 6/10 abdominal pain, receiving Dilaudid per MAR. She has been repositioned. 0000: Patient has requested to be repositioned every 20 minutes. She is softly crying in room. 0600: Last flush has been done. Patient received Dilaudid for pain. She has been repositioned. 80mL from YULISA. 240mL from ostomy. No complaints of nausea at this time.     Electronically signed by Manuel Ramon RN on 2/12/2021 at 7:42 PM

## 2021-02-13 NOTE — PROGRESS NOTES
Physical Therapy Med Surg Daily Treatment Note  Facility/Department:  [facility] UNIT  [id number]       NAME: Alli Boyle  : 1950 (69 y.o.)  MRN: [id number]  CODE STATUS: Full Code    Date of Service: 2021    Patient Diagnosis(es): Malignant ascites [R18.0]   Chief Complaint   Patient presents with    Fatigue     sent by dr Rosalina Hayden for dehydration work up , patient has been vomiting and not keeping anything down and is very fatigued     Patient Active Problem List    Diagnosis Date Noted    Severe malnutrition ([address] 02/10/2021    Fatigue     Malignant ascites 2021        Past Medical History:   Diagnosis Date    Anxiety     panic attacks    Fibroids     has complete hysterctomy    Kidney stone     14 years ago     Past Surgical History:   Procedure Laterality Date    HYSTERECTOMY, TOTAL ABDOMINAL      LAPAROTOMY N/A 2021    EXPLORATORY LAPAROTOMY, SUBTOTAL COLECTOMY,  END ILLEOSTOMY, INSERTION GASTROSTOMY TUBE. performed by Cherelle Bear MD at [address] [place] [address] 2021    4,440 ml removed by Dr. Tracey Anguiano 2021    SIGMOIDOSCOPY BIOPSY FLEXIBLE performed by Ester Lucas MD at [facility]       Restrictions  Restrictions/Precautions: Fall Risk  Position Activity Restriction  Other position/activity restrictions: NPO; abdominal precautions s/p lap    SUBJECTIVE   Subjective  Subjective: Pt's  present at beginning of tx. Pt reluctantly agreeable to therapy.     Pre-Session Pain Report  Pre Treatment Pain Screening  Pain at present: 6  Scale Used: Numeric Score  Intervention List: Patient able to continue with treatment  Pain Screening  Patient Currently in Pain: Yes       Post-Session Pain Report  Pain Assessment  Pain Assessment: 0-10  Pain Level: 6     OBJECTIVE   Orientation  Overall Orientation Status: Within Functional Limits    Bed mobility  Rolling to Right: Moderate assistance

## 2021-02-14 LAB
ANION GAP SERPL CALCULATED.3IONS-SCNC: 8 MEQ/L (ref 9–15)
BASOPHILS ABSOLUTE: 0 K/UL (ref 0–0.2)
BASOPHILS RELATIVE PERCENT: 0.1 %
BLOOD CULTURE, ROUTINE: NORMAL
BUN BLDV-MCNC: 16 MG/DL (ref 8–23)
CALCIUM SERPL-MCNC: 7.6 MG/DL (ref 8.5–9.9)
CHLORIDE BLD-SCNC: 110 MEQ/L (ref 95–107)
CO2: 23 MEQ/L (ref 20–31)
CREAT SERPL-MCNC: 0.75 MG/DL (ref 0.5–0.9)
CULTURE, BLOOD 2: NORMAL
EOSINOPHILS ABSOLUTE: 0.2 K/UL (ref 0–0.7)
EOSINOPHILS RELATIVE PERCENT: 1.1 %
GFR AFRICAN AMERICAN: >60
GFR NON-AFRICAN AMERICAN: >60
GLUCOSE BLD-MCNC: 110 MG/DL (ref 60–115)
GLUCOSE BLD-MCNC: 111 MG/DL (ref 60–115)
GLUCOSE BLD-MCNC: 123 MG/DL (ref 70–99)
GLUCOSE BLD-MCNC: 126 MG/DL (ref 60–115)
HCT VFR BLD CALC: 30.4 % (ref 37–47)
HEMOGLOBIN: 10 G/DL (ref 12–16)
LYMPHOCYTES ABSOLUTE: 1.2 K/UL (ref 1–4.8)
LYMPHOCYTES RELATIVE PERCENT: 8.8 %
MCH RBC QN AUTO: 28.8 PG (ref 27–31.3)
MCHC RBC AUTO-ENTMCNC: 32.9 % (ref 33–37)
MCV RBC AUTO: 87.6 FL (ref 82–100)
MONOCYTES ABSOLUTE: 0.8 K/UL (ref 0.2–0.8)
MONOCYTES RELATIVE PERCENT: 5.6 %
NEUTROPHILS ABSOLUTE: 12 K/UL (ref 1.4–6.5)
NEUTROPHILS RELATIVE PERCENT: 84.4 %
PDW BLD-RTO: 14.1 % (ref 11.5–14.5)
PERFORMED ON: ABNORMAL
PERFORMED ON: NORMAL
PERFORMED ON: NORMAL
PLATELET # BLD: 342 K/UL (ref 130–400)
POTASSIUM REFLEX MAGNESIUM: 3.9 MEQ/L (ref 3.4–4.9)
RBC # BLD: 3.47 M/UL (ref 4.2–5.4)
SODIUM BLD-SCNC: 141 MEQ/L (ref 135–144)
WBC # BLD: 14.2 K/UL (ref 4.8–10.8)

## 2021-02-14 PROCEDURE — 2580000003 HC RX 258: Performed by: INTERNAL MEDICINE

## 2021-02-14 PROCEDURE — 2580000003 HC RX 258: Performed by: FAMILY MEDICINE

## 2021-02-14 PROCEDURE — 36415 COLL VENOUS BLD VENIPUNCTURE: CPT

## 2021-02-14 PROCEDURE — 6370000000 HC RX 637 (ALT 250 FOR IP): Performed by: COLON & RECTAL SURGERY

## 2021-02-14 PROCEDURE — 1210000000 HC MED SURG R&B

## 2021-02-14 PROCEDURE — 99024 POSTOP FOLLOW-UP VISIT: CPT | Performed by: SURGERY

## 2021-02-14 PROCEDURE — 6360000002 HC RX W HCPCS: Performed by: INTERNAL MEDICINE

## 2021-02-14 PROCEDURE — 6360000002 HC RX W HCPCS: Performed by: FAMILY MEDICINE

## 2021-02-14 PROCEDURE — 2580000003 HC RX 258: Performed by: COLON & RECTAL SURGERY

## 2021-02-14 PROCEDURE — 85025 COMPLETE CBC W/AUTO DIFF WBC: CPT

## 2021-02-14 PROCEDURE — 6360000002 HC RX W HCPCS: Performed by: COLON & RECTAL SURGERY

## 2021-02-14 PROCEDURE — 80048 BASIC METABOLIC PNL TOTAL CA: CPT

## 2021-02-14 RX ADMIN — SENNOSIDES 17.2 MG: 8.6 TABLET, FILM COATED ORAL at 20:13

## 2021-02-14 RX ADMIN — HYDROMORPHONE HYDROCHLORIDE 0.5 MG: 1 INJECTION, SOLUTION INTRAMUSCULAR; INTRAVENOUS; SUBCUTANEOUS at 10:35

## 2021-02-14 RX ADMIN — METOCLOPRAMIDE HYDROCHLORIDE 5 MG: 5 INJECTION INTRAMUSCULAR; INTRAVENOUS at 05:13

## 2021-02-14 RX ADMIN — METOCLOPRAMIDE HYDROCHLORIDE 5 MG: 5 INJECTION INTRAMUSCULAR; INTRAVENOUS at 16:14

## 2021-02-14 RX ADMIN — Medication 10 ML: at 08:05

## 2021-02-14 RX ADMIN — HYDROMORPHONE HYDROCHLORIDE 0.5 MG: 1 INJECTION, SOLUTION INTRAMUSCULAR; INTRAVENOUS; SUBCUTANEOUS at 20:13

## 2021-02-14 RX ADMIN — ENOXAPARIN SODIUM 40 MG: 40 INJECTION SUBCUTANEOUS at 08:04

## 2021-02-14 RX ADMIN — LORAZEPAM 0.5 MG: 2 INJECTION INTRAMUSCULAR at 05:14

## 2021-02-14 RX ADMIN — ONDANSETRON 4 MG: 2 INJECTION INTRAMUSCULAR; INTRAVENOUS at 17:30

## 2021-02-14 RX ADMIN — PANTOPRAZOLE SODIUM 40 MG: 40 TABLET, DELAYED RELEASE ORAL at 05:14

## 2021-02-14 RX ADMIN — HYDROMORPHONE HYDROCHLORIDE 0.5 MG: 1 INJECTION, SOLUTION INTRAMUSCULAR; INTRAVENOUS; SUBCUTANEOUS at 15:08

## 2021-02-14 RX ADMIN — LORAZEPAM 0.5 MG: 2 INJECTION INTRAMUSCULAR at 22:53

## 2021-02-14 RX ADMIN — METOCLOPRAMIDE HYDROCHLORIDE 5 MG: 5 INJECTION INTRAMUSCULAR; INTRAVENOUS at 08:04

## 2021-02-14 RX ADMIN — PIPERACILLIN AND TAZOBACTAM 3375 MG: 3; .375 INJECTION, POWDER, LYOPHILIZED, FOR SOLUTION INTRAVENOUS at 06:17

## 2021-02-14 RX ADMIN — PIPERACILLIN AND TAZOBACTAM 3375 MG: 3; .375 INJECTION, POWDER, LYOPHILIZED, FOR SOLUTION INTRAVENOUS at 14:00

## 2021-02-14 RX ADMIN — SODIUM CHLORIDE: 9 INJECTION, SOLUTION INTRAVENOUS at 16:14

## 2021-02-14 RX ADMIN — PIPERACILLIN AND TAZOBACTAM 3375 MG: 3; .375 INJECTION, POWDER, LYOPHILIZED, FOR SOLUTION INTRAVENOUS at 22:53

## 2021-02-14 RX ADMIN — ONDANSETRON 4 MG: 2 INJECTION INTRAMUSCULAR; INTRAVENOUS at 22:52

## 2021-02-14 ASSESSMENT — PAIN SCALES - GENERAL: PAINLEVEL_OUTOF10: 0

## 2021-02-14 NOTE — PROGRESS NOTES
Pt is alert and oriented x 2-3. Ileostomy producing loose brown stool. Pt tolerating tube feeding. C/o generalized pain. Medicated with prn dilaudid. C/o mild nausea. Pt very weak. Being turned every 2 hours in the bed.     Electronically signed by Louise Haro RN on 2/14/2021 at 4:03 PM

## 2021-02-14 NOTE — PROGRESS NOTES
Hospitalist Progress Note      PCP: Liza Galicia MD    Date of Admission: 2/1/2021    Chief Complaint:    Chief Complaint   Patient presents with    Fatigue     sent by dr Rafita Delarosa for dehydration work up , patient has been vomiting and not keeping anything down and is very fatigued     Subjective:  Patient resting in bed, no cp, sob. Fatigued postop. Medications:  Reviewed    Infusion Medications    sodium chloride 50 mL/hr at 02/14/21 1614    dextrose       Scheduled Medications    sodium chloride flush  10 mL Intravenous 2 times per day    piperacillin-tazobactam  3,375 mg Intravenous Q8H    [Held by provider] insulin glargine  10 Units Subcutaneous Nightly    insulin lispro  0-6 Units Subcutaneous Q6H    pantoprazole  40 mg Oral QAM AC    [Held by provider] spironolactone  50 mg Oral Daily    [Held by provider] furosemide  20 mg Oral Daily    sodium chloride flush  10 mL Intravenous 2 times per day    enoxaparin  40 mg Subcutaneous Daily    senna  2 tablet Oral Nightly     PRN Meds: metoclopramide, LORazepam, HYDROmorphone, sodium chloride flush, glucose, dextrose, glucagon (rDNA), dextrose, potassium chloride, magnesium sulfate, sodium phosphate IVPB **OR** sodium phosphate IVPB, benzocaine, sodium chloride flush, promethazine **OR** ondansetron, acetaminophen **OR** acetaminophen      Intake/Output Summary (Last 24 hours) at 2/14/2021 1615  Last data filed at 2/14/2021 1404  Gross per 24 hour   Intake 2341 ml   Output 1440 ml   Net 901 ml     Exam:    /64   Pulse 87   Temp 97.1 °F (36.2 °C) (Oral)   Resp 18   Ht 5' 5\" (1.651 m)   Wt 150 lb (68 kg)   SpO2 100%   BMI 24.96 kg/m²     General appearance: NAD  HEENT: Conjunctivae/corneas clear. Neck:  Trachea midline. Respiratory:  Normal respiratory effort. Clear to auscultation  Cardiovascular: Regular rate and rhythm   Abdomen: Well wrapped, no drainage  Musculoskeletal: No clubbing, cyanosis or edema bilaterally. Neuro: Non Focal  Capillary Refill: Brisk,< 3 seconds   Peripheral Pulses: +2 palpable, equal bilaterally     Labs:   Recent Labs     02/12/21 0619 02/13/21 0628 02/14/21 0633   WBC 16.3* 14.1* 14.2*   HGB 9.7* 9.7* 10.0*   HCT 29.0* 29.9* 30.4*    303 342     Recent Labs     02/12/21 0619 02/13/21 0628 02/14/21 0633    140 141   K 3.4 3.9 3.9   * 111* 110*   CO2 21 21 23   BUN 23 16 16   CREATININE 0.79 0.64 0.75   CALCIUM 7.5* 7.3* 7.6*     No results for input(s): AST, ALT, BILIDIR, BILITOT, ALKPHOS in the last 72 hours. No results for input(s): INR in the last 72 hours. No results for input(s): Nelson Fling in the last 72 hours. Urinalysis:      Lab Results   Component Value Date    NITRU Negative 02/01/2021    BLOODU Negative 02/01/2021    SPECGRAV 1.065 02/01/2021    GLUCOSEU Negative 02/01/2021     Radiology:  FL BARIUM ENEMA   Final Result   Incomplete barium enema. Patient unable to tolerate procedure. There is diffuse dilation of the bowel. FL SMALL BOWEL FOLLOW THROUGH ONLY   Final Result      Dilated small bowel loops, with persistent delay at level of proximal jejunum. Contrast medium visualized ileocecal valve at 4 hours. Following remains air-filled and dilated at that time. Findings suggest stenosis/partial obstruction at level of distal    colon and at level of mid jejunum. XR CHEST ABDOMEN NG PLACEMENT   Final Result   INTERVAL PLACEMENT OF NASOGASTRIC TUBE AS DESCRIBED ABOVE      XR ABDOMEN (KUB) (SINGLE AP VIEW)   Final Result   DISTENDED DILATED LOOPS OF LARGE BOWEL WITH A TRANSITION POINT IN THE REGION OF THE DESCENDING COLON, SIGMOID. FINDINGS COULD SUGGEST THAT OF POSSIBLE HIGH-GRADE OBSTRUCTION. FURTHER EVALUATION IS WARRANTED. US PELVIS COMPLETE   Final Result   FREE FLUID WITHIN THE PELVIS. EXAMINATION:  US DUP ABD PEL RETRO SCROT COMPLETE      CLINICAL HISTORY: Abnormal CT scan.       COMPARISONS:  NONE AVAILABLE TECHNIQUE:  Transabdominal ultrasound of the right upper quadrant with both duplex color ultrasound and spectral Doppler ultrasound with interrogation of the hepatic and portal venous system was performed. FINDINGS:     The visualized portion of the right lobe of the liver shows no focal parenchymal abnormalities. The hypoechoic area seen at the inferior medial aspect of the right lobe of liver on CT is not appreciated. Within the field-of-view there are findings of cholelithiasis. There is ascites present. The hepatic veins and portal veins are patent. Normal direction seen within the portal veins and hepatic vein. IMPRESSION:        1. ASCITES IS PRESENT WITHIN THE FIELD-OF-VIEW. 2. CHOLELITHIASIS. 3. UNREMARKABLE SONOGRAPHIC EXAMINATION OF THE HEPATIC AND PORTAL VEIN. US DUP ABD PEL RETRO SCROT COMPLETE   Final Result   FREE FLUID WITHIN THE PELVIS. EXAMINATION:  US DUP ABD PEL RETRO SCROT COMPLETE      CLINICAL HISTORY: Abnormal CT scan. COMPARISONS:  NONE AVAILABLE      TECHNIQUE:  Transabdominal ultrasound of the right upper quadrant with both duplex color ultrasound and spectral Doppler ultrasound with interrogation of the hepatic and portal venous system was performed. FINDINGS:     The visualized portion of the right lobe of the liver shows no focal parenchymal abnormalities. The hypoechoic area seen at the inferior medial aspect of the right lobe of liver on CT is not appreciated. Within the field-of-view there are findings of cholelithiasis. There is ascites present. The hepatic veins and portal veins are patent. Normal direction seen within the portal veins and hepatic vein. IMPRESSION:        1. ASCITES IS PRESENT WITHIN THE FIELD-OF-VIEW. 2. CHOLELITHIASIS. 3. UNREMARKABLE SONOGRAPHIC EXAMINATION OF THE HEPATIC AND PORTAL VEIN.       US GUIDED PARACENTESIS   Final Result 1.  Status post technically successful ultrasound-guided paracentesis. Romain Celis is a Female of 69 years age, referred for Ultrasound Guided Paracentesis. PROCEDURE: Survey of the abdomen showed large amount of ascites fluid. After obtaining informed consent, the patient was positioned supine on the sonography table. Using ultrasound, the skin over the left hemiabdomen was locally anesthetized with 1% lidocaine. Following that, a Yueh needle was advanced into the fluid    pocket using ultrasound visualization. 4440cc, of clear yellow fluid were aspirated and sent for cytology, and pathology. The needle was removed, and hemostasis was obtained with pressure. A Band-Aid was placed. Post procedure images did not demonstrate hemorrhage at the target site. The patient tolerated the procedure well. The patient left the department in good condition. A radiology nurse was in presence monitoring vital signs, assisting throughout the procedure. US ABDOMEN LIMITED   Final Result   The portal veins, hepatic vein, and common hepatic artery are patent with normal direction of blood flow. No lesions are seen in the right liver lobe, though ultrasound is limited, and if there is clinical suspicion, MRI of the liver can be    obtained. CT ABDOMEN PELVIS W IV CONTRAST Additional Contrast? None   Final Result      Thickening and nodularity of the omentum concerning for peritoneal metastasis or peritonitis. A large amount of ascites is present. Two ill-defined lesions within the right lobe of the liver measuring 1 cm and 2 cm respectively are nonspecific but most concerning for metastatic lesions. Mild perivesicular inflammation. Correlation with urinalysis recommended to exclude cystitis. Large amount stool within the rectosigmoid colon may represent constipation and/or fecal impaction. Subtle nodularity of the liver suggests cirrhosis. Mild right-sided hydronephrosis without radiopaque calculus. Debris within the gallbladder likely represents gallstones and/or gallbladder sludge. Small right pleural effusion. All CT scans at this facility use dose modulation, iterative reconstruction, and/or weight based dosing when appropriate to reduce radiation dose to as low as reasonably achievable. Assessment/Plan:    1. 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 Possibly pancreatic vs cholangiocarcinoma based on CA 19-9; plan for MRCP when more stable; other sources being worked up by oncology  2. High grade obstruction of descending colon: Will need colectomy; patient is deciding; if she is agreeable then plan for OR tomorrow at 3pm  1. 2/8 - s/p colectomy with diverting ostomy, moved to icu for extensive procedure post op care. 2. 2/9 - pod 1, sips, cont post op care per surgery; ICU as required levophed for post op hypotension  3. 2/10 - pod 2, cont routine care, clears, tube feeds, monitor drain output  4. 2/11 - tube feeds per dietary  5. 2/12 - cont tube feeds per surgery  6. 2/13 - slowed tube feeds due to intolerance. 3. Hyponatremia/BALA  1. 2/9 - both improved with iv fluids  4. Right sided pleural effusion:  Likely from the ascites; ascites drained  5. Functional Status: Fall precautions. Up with assistance. PT OT  6. Diet: NPO  7. DVT ppx: Lovenox SCDs  8. Disposition: Dependent on hospital course. Will discharge once medically stable. SW on board for discharge planning. 1. 2/11 - await final nutrition plan post-op, dc planning soon  2. 2/12 - planning for dc home 1-2 days once home care planned  3. 2/13 - patient requiring max assist, will need snf when tube feeds are adequate.     Active Hospital Problems    Diagnosis Date Noted    Severe malnutrition ([address] [E43] 02/10/2021    Fatigue [R53.83]     Malignant ascites [R18.0] 02/01/2021 Additional work up or/and treatment plan may be added today or then after based on clinical progression. I am managing a portion of pt care. Some medical issues are handled by other specialists. Additional work up and treatment should be done in out pt setting by pt PCP and other out pt providers. In addition to examining and evaluating pt, I spent additional time explaining care, normal and abnormal findings, and treatment plan. All of pt questions were answered. Counseling, diet and education were  provided. Case will be discussed with nursing staff when appropriate. Family will be updated if and when appropriate.       Diet: DIET CLEAR LIQUID;  Diet Tube Feed Continuous/Cyclic w/ Diet    Code Status: Full Code    PT/OT Eval     Electronically signed by Dayna Pinzon MD on 2/14/2021 at 4:15 PM

## 2021-02-14 NOTE — PROGRESS NOTES
Pt Name: Vinay Recinos  Medical Record Number: [id number]  Date of Birth 1950   Admit date 2/1/2021  1:30 PM  Today's Date: 2/13/2021     ASSESSMENT  1. Post op day # 5  2. Vinay Recinos had Procedure(s):  EXPLORATORY LAPAROTOMY, SUBTOTAL COLECTOMY,  END ILLEOSTOMY, INSERTION GASTROSTOMY TUBE. 3.  High residual from tube feedings  4. Probable postoperative ileus    PLAN  1. Hold tube feedings  2. Restart tube feedings later today at 10 cc an hour    SUBJECTIVE  Chief complaint: Nauseated  Afebrile, vital signs are stable. She denies any vomiting, ileostomy appears to be working. She is tolerating a DIET CLEAR LIQUID;  Diet Tube Feed Continuous/Cyclic w/ Diet. Her pain is well controlled on current medications. has a past medical history of Anxiety, Fibroids, and Kidney stone.     CURRENT MEDS  Scheduled Meds:   sodium chloride flush  10 mL Intravenous 2 times per day    piperacillin-tazobactam  3,375 mg Intravenous Q8H    [Held by provider] insulin glargine  10 Units Subcutaneous Nightly    insulin lispro  0-6 Units Subcutaneous Q6H    pantoprazole  40 mg Oral QAM AC    [Held by provider] spironolactone  50 mg Oral Daily    [Held by provider] furosemide  20 mg Oral Daily    sodium chloride flush  10 mL Intravenous 2 times per day    enoxaparin  40 mg Subcutaneous Daily    senna  2 tablet Oral Nightly     Continuous Infusions:   sodium chloride 50 mL/hr at 02/13/21 0523    dextrose       PRN Meds:.metoclopramide, LORazepam, HYDROmorphone, sodium chloride flush, glucose, dextrose, glucagon (rDNA), dextrose, potassium chloride, magnesium sulfate, sodium phosphate IVPB **OR** sodium phosphate IVPB, benzocaine, sodium chloride flush, promethazine **OR** ondansetron, acetaminophen **OR** acetaminophen    OBJECTIVE CURRENT VITALS:  height is 5' 5\" (1.651 m) and weight is 150 lb (68 kg). Her oral temperature is 97.7 °F (36.5 °C). Her blood pressure is 117/67 and her pulse is 88. Her respiration is 18 and oxygen saturation is 100%. Temperature Range (24h):Temp: 97.7 °F (36.5 °C) Temp  Av.7 °F (36.5 °C)  Min: 97.7 °F (36.5 °C)  Max: 97.7 °F (36.5 °C)  BP Range (44D): Systolic (99YPL), UYQ:932 , Min:117 , JXE:473     Diastolic (45RBM), OEM:14, Min:67, Max:67    Pulse Range (24h): Pulse  Av  Min: 88  Max: 88  Respiration Range (24h): Resp  Av  Min: 18  Max: 18    GENERAL: alert, no distress  LUNGS: clear to ausculation, without wheezes, rales or rhonci  HEART: normal rate and regular rhythm  ABDOMEN: soft, incisional tenderness, non-distended, ileostomy working, YULISA drain with serous fluid, bowel sounds present in all 4 quadrants and no guarding or peritoneal signs, PEG tube in epigastric region  EXTERMITY: no cyanosis, clubbing or edema    In: 2467 [P.O.:125; I.V.:1320; NG/GT:1022]  Out: 1590 [Urine:750; Drains:180]  Date 21 - 21 2359   Shift 5440-8232 4716-4792 0779-0416 24 Hour Total   INTAKE   P.O.(mL/kg/hr) 125(0.2) 0(0)  125   I. V.(mL/kg) 20(0.3)  1300(19.1) 1320(19.4)   Other(mL/kg) 0(0)   0(0)   NG/GT(mL/kg) 1345(96)   7234(54)   Shift Total(mL/kg) 6205(15.3) 0(0) 1300(19.1) 5741(57.4)   OUTPUT   Urine(mL/kg/hr) 350(0.6)  400 750   Emesis/NG output(mL/kg) 0(0)   0(0)   Drains(mL/kg) 80(1.2)  100(1.5) 180(2.6)   Stool(mL/kg) 240(3.5) 220(3.2) 200(2.9) 660(9.7)   Shift Total(mL/kg) 670(9.8) 220(3.2) 700(10.3) 5026(46.9)   Weight (kg) 68 68 68 68       LABS  Recent Labs     21  0643 21  0619 21  0628   WBC 19.3* 16.3* 14.1*   HGB 9.1* 9.7* 9.7*   HCT 28.2* 29.0* 29.9*    277 303    140 140   K 3.1* 3.4 3.9   * 109* 111*   CO2 18* 21 21   BUN 29* 23 16   CREATININE 0.94* 0.79 0.64   MG 2.0 1.9  --    CALCIUM 7.2* 7.5* 7.3* No results for input(s): PTT, INR in the last 72 hours. Invalid input(s): PT  No results for input(s): AST, ALT, BILITOT, BILIDIR, AMYLASE, LIPASE, LDH, LACTA in the last 72 hours. RADIOLOGY  Echocardiogram Complete 2d With Doppler With Color    Result Date: 2/3/2021  Transthoracic Echocardiography Report (TTE)  Demographics  Patient Name    Guadalupe Brown Gender               Female  Patient Number  [id number]       Race                                                  Ethnicity  Visit Number    [id number]      Room Number          P752  Corporate ID                   Date of Study        02/03/2021  Accession       [id number]     Referring Physician  Number  Date of Birth   1950     Sonographer          [address]  Age             69 year(s)     Interpreting         [facility])                                 Physician            Cardiology                                                      Chelsy Ho MD Procedure Type of Study  TTE procedure:ECHO COMPLETE 2D W/DOP W/COLOR. Procedure Date Date: 02/03/2021 Start: 08:33 AM Study Location: Portable Technical Quality: Poor visualization due to poor acoustical window. Indications:LVF. Patient Status: Routine Height: 65 inches Weight: 153 pounds BSA: 1.77 m^2 BMI: 25.46 kg/m^2  Conclusions  Summary  Very limited and technically difficult study. LV Function appears to be preserved. will need repeat at later time.  when more stable  Signature  ----------------------------------------------------------------  Electronically signed by Chelsy Ho MD(Interpreting  physician) on 02/03/2021 12:07 PM  ----------------------------------------------------------------    Xr Abdomen (kub) (single Ap View)    Result Date: 2/4/2021 EXAMINATION: KUB CLINICAL HISTORY: No bowel movement for 2 weeks. COMPARISON :None FINDINGS: 3 view of the abdomen  submitted. There are multiply dilated and distended loops of large bowel. The transition point may be within the region of the distal descending colon and sigmoid. Bowel gas is seen within small bowel. 3 view of the abdomen shows bowel gas in both large and small bowel. DISTENDED DILATED LOOPS OF LARGE BOWEL WITH A TRANSITION POINT IN THE REGION OF THE DESCENDING COLON, SIGMOID. FINDINGS COULD SUGGEST THAT OF POSSIBLE HIGH-GRADE OBSTRUCTION. FURTHER EVALUATION IS WARRANTED. Us Pelvis Complete    Result Date: 2/11/2021  EXAMINATION:  US PELVIS COMPLETE CLINICAL HISTORY: Ascites. COMPARISONS:  NONE AVAILABLE TECHNIQUE:  Transabdominal ultrasound of the pelvis. FINDINGS:  The uterus is surgically absent. Both left and right ovaries are surgically absent. There is free fluid within the pelvis. FREE FLUID WITHIN THE PELVIS. EXAMINATION:  US DUP ABD PEL RETRO SCROT COMPLETE CLINICAL HISTORY: Abnormal CT scan. COMPARISONS:  NONE AVAILABLE TECHNIQUE:  Transabdominal ultrasound of the right upper quadrant with both duplex color ultrasound and spectral Doppler ultrasound with interrogation of the hepatic and portal venous system was performed. FINDINGS:  The visualized portion of the right lobe of the liver shows no focal parenchymal abnormalities. The hypoechoic area seen at the inferior medial aspect of the right lobe of liver on CT is not appreciated. Within the field-of-view there are findings of cholelithiasis. There is ascites present. The hepatic veins and portal veins are patent. Normal direction seen within the portal veins and hepatic vein. IMPRESSION:  1. ASCITES IS PRESENT WITHIN THE FIELD-OF-VIEW. 2. CHOLELITHIASIS. 3. UNREMARKABLE SONOGRAPHIC EXAMINATION OF THE HEPATIC AND PORTAL VEIN.     Ct Abdomen Pelvis W Iv Contrast Additional Contrast? None    Result Date: 2/1/2021 EXAM:  CT ABDOMEN PELVIS W IV CONTRAST History: Abdominal distention. Abdominal pain. Technique: Multiple contiguous axial images were obtained of the abdomen and pelvis from an level of the lung bases through the ischial tuberosities with IV contrast. Multiplanar reformats were obtained. Delayed images were obtained. Comparison: None available Findings: Small right pleural effusion. Bibasilar subsegmental atelectasis. Subtle nodularity of the liver. A 1 cm hypodense lesion within the right lobe of the liver is seen on axial series 2 image 29 and an ill-defined area of hypodensity within the inferior right lobe of the liver measuring approximately 2 cm is seen on axial  series 2 image 38. The gallbladder is physiologically distended and contains multiple small densities within the dependent portion. No gallbladder wall thickening is identified. The stomach, spleen, pancreas, and adrenal glands appear within normal limits. Small hiatal hernia. There is thickening and nodularity of the omentum. There is a large amount of ascites demonstrating simple fluid density. The kidneys enhance uniformly. There is mild right-sided hydronephrosis however no radiopaque or urinary tract calculi identified. No left-sided urinary tract calculi or hydronephrosis. Urinary bladder is suboptimally distended. There is mild perivesicular inflammation. The uterus is surgically absent Abdominal aorta is nonaneurysmal  . No retroperitoneal or abdominal/pelvic lymphadenopathy. No small bowel obstruction. No overt colonic mass or pericolonic inflammation although the large amount of ascites obscures evaluation for inflammation. There is a large amount of stool within the rectosigmoid colon. The appendix is not definitively visualized. No pneumoperitoneum or portal venous gas. No acute or aggressive osseous abnormality. Degenerative changes of the spine. Thickening and nodularity of the omentum concerning for peritoneal metastasis or peritonitis. A large amount of ascites is present. Two ill-defined lesions within the right lobe of the liver measuring 1 cm and 2 cm respectively are nonspecific but most concerning for metastatic lesions. Mild perivesicular inflammation. Correlation with urinalysis recommended to exclude cystitis. Large amount stool within the rectosigmoid colon may represent constipation and/or fecal impaction. Subtle nodularity of the liver suggests cirrhosis. Mild right-sided hydronephrosis without radiopaque calculus. Debris within the gallbladder likely represents gallstones and/or gallbladder sludge. Small right pleural effusion. All CT scans at this facility use dose modulation, iterative reconstruction, and/or weight based dosing when appropriate to reduce radiation dose to as low as reasonably achievable. Us Abdomen Limited    Result Date: 2/3/2021  CLINICAL INDICATION:   Patient with new ascites and abdominal pain, concern for metastatic disease COMPARISON: CT dating February 1, 2021 DISCUSSION:     Transverse and longitudinal images of the right upper quadrant of the abdomen were obtained with color Doppler interrogation of the hepatic vessels. The liver is normal  in echogenicity and no evidence of focal masses seen in the right liver lobe. The hepatic vein, and portal veins are patent. There is normal direction of blood flow in the portal veins towards the liver. The main portal vein, right portal vein, left portal vein, and hepatic vein are patent with normal direction. The common hepatic artery is patent with velocity measuring 72 cm/s. The gallbladder is seen with echogenic gallstones. The gallbladder wall measures 2.4 mm. The portal veins, hepatic vein, and common hepatic artery are patent with normal direction of blood flow. No lesions are seen in the right liver lobe, though ultrasound is limited, and if there is clinical suspicion, MRI of the liver can be obtained. Us Dup Abd Pel Retro Scrot Complete    Result Date: 2/11/2021  EXAMINATION:  US PELVIS COMPLETE CLINICAL HISTORY: Ascites. COMPARISONS:  NONE AVAILABLE TECHNIQUE:  Transabdominal ultrasound of the pelvis. FINDINGS:  The uterus is surgically absent. Both left and right ovaries are surgically absent. There is free fluid within the pelvis. FREE FLUID WITHIN THE PELVIS. EXAMINATION:  US DUP ABD PEL RETRO SCROT COMPLETE CLINICAL HISTORY: Abnormal CT scan. COMPARISONS:  NONE AVAILABLE TECHNIQUE:  Transabdominal ultrasound of the right upper quadrant with both duplex color ultrasound and spectral Doppler ultrasound with interrogation of the hepatic and portal venous system was performed. FINDINGS:  The visualized portion of the right lobe of the liver shows no focal parenchymal abnormalities. The hypoechoic area seen at the inferior medial aspect of the right lobe of liver on CT is not appreciated. Within the field-of-view there are findings of cholelithiasis. There is ascites present. The hepatic veins and portal veins are patent. Normal direction seen within the portal veins and hepatic vein. IMPRESSION:  1. ASCITES IS PRESENT WITHIN THE FIELD-OF-VIEW. 2. CHOLELITHIASIS. 3. UNREMARKABLE SONOGRAPHIC EXAMINATION OF THE HEPATIC AND PORTAL VEIN. Xr Chest Abdomen Ng Placement    Result Date: 2/4/2021  EXAMINATION: LOW CHEST HIGH KUB CLINICAL HISTORY: NG tube placement COMPARISON : The right fourth 2021 FINDINGS: Interval placement of nasogastric tube. The tip is within the stomach. Within the field-of-view there is still prominent dilated loops of large bowel. .     INTERVAL PLACEMENT OF NASOGASTRIC TUBE AS DESCRIBED ABOVE    Us Guided Paracentesis 1.  Status post technically successful ultrasound-guided paracentesis. Stephon Voss is a Female of 69 years age, referred for Ultrasound Guided Paracentesis. PROCEDURE: Survey of the abdomen showed large amount of ascites fluid. After obtaining informed consent, the patient was positioned supine on the sonography table. Using ultrasound, the skin over the left hemiabdomen was locally anesthetized with 1% lidocaine. Following that, a Yueh needle was advanced into the fluid pocket using ultrasound visualization. 4440cc, of clear yellow fluid were aspirated and sent for cytology, and pathology. The needle was removed, and hemostasis was obtained with pressure. A Band-Aid was placed. Post procedure images did not demonstrate hemorrhage at the target site. The patient tolerated the procedure well. The patient left the department in good condition. A radiology nurse was in presence monitoring vital signs, assisting throughout the procedure.      Fl Small Bowel Follow Through Only    Result Date: 2/7/2021 Small bowel follow-through. HISTORY: Postcolonoscopy. COMPARISON: CT abdomen pelvis, February 1, 2021. FINDINGS:  image shows a nasogastric tube in stomach. Marked diffuse dilatation of colon cecum sigmoid colon. 0 minute film shows administration of oral contrast medium via nasogastric tube with contrast medium filling in stomach. 30 minute image shows contrast medium within attenuated distal stomach. Contrast medium visualized filling the duodenum with loop of small bowel coursing into right lower quadrant. Contrast medium also fills normal caliber jejunal loops and epigastric area residual contrast medium from attempted barium enema identified sigmoid colon. 60 minute image shows contrast medium pooling within jejunal loops in the right lower quadrant, and contrast medium to a lesser degree filling jejunal loops found in the left perigastric region. 90 minute image shows mild progression of contrast medium into proximal and mid jejunal loops. Several loops shows little progression of contrast medium passed level of mid jejunum. 4 hour image shows contrast medium with bowel loops in left lower quadrant. However, contrast media now visualized overlying region of the ileocecal valve and portion of ascending colon. 6 hour image shows additional contrast medium entering ascending colon and now entering proximal transverse colon, splenic flexure, and proximal descending colon. There is marked evaluate attenuation of contrast medium within the small bowel. At 8.5 hours, there is contrast medium continues to empty from the small bowel, and now is layering, in the dilated ascending colon. Dilatation of the cecum, measuring approximately 13.7 cm is identified. Residual contrast medium still fills the rectum. Final imaging of 21.5 hours shows near complete evacuation of contrast medium from the small bowel, January dilatation seen to the transverse diameter 17.2 cm, and contrast medium filling the ascending colon. There is still markedly pronounced more distal colon. Dilated small bowel loops, with persistent delay at level of proximal jejunum. Contrast medium visualized ileocecal valve at 4 hours. Following remains air-filled and dilated at that time. Findings suggest stenosis/partial obstruction at level of distal colon and at level of mid jejunum. Fl Barium Enema    Result Date: 2/8/2021  COMPARISON: February 4, 2021 Fluoroscopic time. 1.6 minutes 17 images were obtained. HISTORY:    Abdominal pain, possible obstruction PATIENT NAME: JOEY SALDANA: TECHNIQUE: FL BARIUM ENEMA FINDINGS: Air is seen within bowel. An NG tube is seen in place with tip in the area of the stomach. Dilated loops of bowel are again seen. The most dilated loop measures up to 11.7 cm. Barium was introduced into the rectum. When the barium went into the distal sigmoid colon, patient was  unable to tolerate the barium and asked for the procedure to be stop. The barium was drained from the colon. Incomplete barium enema. Patient unable to tolerate procedure. There is diffuse dilation of the bowel.     Electronically signed by Gardenia Sánchez MD on 2/13/2021 at 8:26 PM

## 2021-02-14 NOTE — PROGRESS NOTES
CURRENT VITALS:  height is 5' 5\" (1.651 m) and weight is 150 lb (68 kg). Her oral temperature is 97.1 °F (36.2 °C). Her blood pressure is 127/64 and her pulse is 87. Her respiration is 18 and oxygen saturation is 100%. Temperature Range (24h):Temp: 97.1 °F (36.2 °C) Temp  Av.1 °F (36.7 °C)  Min: 97.1 °F (36.2 °C)  Max: 99.1 °F (37.3 °C)  BP Range (05F): Systolic (52DBN), SKU:062 , Min:120 , BZC:639     Diastolic (88MUK), TIS:82, Min:63, Max:64    Pulse Range (24h): Pulse  Av.5  Min: 87  Max: 92  Respiration Range (24h): Resp  Av  Min: 16  Max: 18    GENERAL: alert, no distress  LUNGS: clear to ausculation, without wheezes, rales or rhonci  HEART: normal rate and regular rhythm  ABDOMEN: soft, incisional tenderness, non-distended, ileostomy working, YULISA drain with serous fluid, bowel sounds present in all 4 quadrants and no guarding or peritoneal signs, PEG tube in epigastric region  EXTERMITY: no cyanosis, clubbing or edema    In: 2341 [P.O.:120; I.V.:; NG/GT:224]  Out: 1160 [Urine:750; Drains:160]  Date 21 - 21 2354   Shift 0378-4542 6718-3405 8547-0888 24 Hour Total   INTAKE   P.O.(mL/kg/hr) 120(0.2)   120   I. V.(mL/kg) 687(10.1) 10(0.1)  697(10.2)   NG/GT(mL/kg) 224(3.3)   224(3.3)   Shift Total(mL/kg) 0343(01.3) 10(0.1)  1041(15.3)   OUTPUT   Urine(mL/kg/hr) 350(0.6)   350   Drains(mL/kg) 60(0.9)   60(0.9)   Stool(mL/kg) 50(0.7)   50(0.7)   Shift Total(mL/kg) 460(6.8)   460(6.8)   Weight (kg) 68 68 68 68       LABS  Recent Labs     21  0619 21  0628 21  0633   WBC 16.3* 14.1* 14.2*   HGB 9.7* 9.7* 10.0*   HCT 29.0* 29.9* 30.4*    303 342    140 141   K 3.4 3.9 3.9   * 111* 110*   CO2 21 21 23   BUN 23 16 16   CREATININE 0.79 0.64 0.75   MG 1.9  --   --    CALCIUM 7.5* 7.3* 7.6*      No results for input(s): PTT, INR in the last 72 hours.     Invalid input(s): PT No results for input(s): AST, ALT, BILITOT, BILIDIR, AMYLASE, LIPASE, LDH, LACTA in the last 72 hours. RADIOLOGY  Echocardiogram Complete 2d With Doppler With Color    Result Date: 2/3/2021  Transthoracic Echocardiography Report (TTE)  Demographics  Patient Name    Santana Gutierrez Gender               Female  Patient Number  [id number]       Race                                                  Ethnicity  Visit Number    [id number]      Room Number          F156  Corporate ID                   Date of Study        02/03/2021  Accession       [id number]     Referring Physician  Number  Date of Birth   1950     Sonographer          [address]  Age             69 year(s)     Interpreting         [address] [place] [address]                                 Physician            Cardiology                                                      Elias Petty MD Procedure Type of Study  TTE procedure:ECHO COMPLETE 2D W/DOP W/COLOR. Procedure Date Date: 02/03/2021 Start: 08:33 AM Study Location: Portable Technical Quality: Poor visualization due to poor acoustical window. Indications:LVF. Patient Status: Routine Height: 65 inches Weight: 153 pounds BSA: 1.77 m^2 BMI: 25.46 kg/m^2  Conclusions  Summary  Very limited and technically difficult study. LV Function appears to be preserved. will need repeat at later time.  when more stable  Signature  ----------------------------------------------------------------  Electronically signed by Elias Petty MD(Interpreting  physician) on 02/03/2021 12:07 PM  ----------------------------------------------------------------    Xr Abdomen (kub) (single Ap View)    Result Date: 2/4/2021 EXAMINATION: KUB CLINICAL HISTORY: No bowel movement for 2 weeks. COMPARISON :None FINDINGS: 3 view of the abdomen  submitted. There are multiply dilated and distended loops of large bowel. The transition point may be within the region of the distal descending colon and sigmoid. Bowel gas is seen within small bowel. 3 view of the abdomen shows bowel gas in both large and small bowel. DISTENDED DILATED LOOPS OF LARGE BOWEL WITH A TRANSITION POINT IN THE REGION OF THE DESCENDING COLON, SIGMOID. FINDINGS COULD SUGGEST THAT OF POSSIBLE HIGH-GRADE OBSTRUCTION. FURTHER EVALUATION IS WARRANTED. Us Pelvis Complete    Result Date: 2/11/2021  EXAMINATION:  US PELVIS COMPLETE CLINICAL HISTORY: Ascites. COMPARISONS:  NONE AVAILABLE TECHNIQUE:  Transabdominal ultrasound of the pelvis. FINDINGS:  The uterus is surgically absent. Both left and right ovaries are surgically absent. There is free fluid within the pelvis. FREE FLUID WITHIN THE PELVIS. EXAMINATION:  US DUP ABD PEL RETRO SCROT COMPLETE CLINICAL HISTORY: Abnormal CT scan. COMPARISONS:  NONE AVAILABLE TECHNIQUE:  Transabdominal ultrasound of the right upper quadrant with both duplex color ultrasound and spectral Doppler ultrasound with interrogation of the hepatic and portal venous system was performed. FINDINGS:  The visualized portion of the right lobe of the liver shows no focal parenchymal abnormalities. The hypoechoic area seen at the inferior medial aspect of the right lobe of liver on CT is not appreciated. Within the field-of-view there are findings of cholelithiasis. There is ascites present. The hepatic veins and portal veins are patent. Normal direction seen within the portal veins and hepatic vein. IMPRESSION:  1. ASCITES IS PRESENT WITHIN THE FIELD-OF-VIEW. 2. CHOLELITHIASIS. 3. UNREMARKABLE SONOGRAPHIC EXAMINATION OF THE HEPATIC AND PORTAL VEIN.     Ct Abdomen Pelvis W Iv Contrast Additional Contrast? None    Result Date: 2/1/2021 EXAM:  CT ABDOMEN PELVIS W IV CONTRAST History: Abdominal distention. Abdominal pain. Technique: Multiple contiguous axial images were obtained of the abdomen and pelvis from an level of the lung bases through the ischial tuberosities with IV contrast. Multiplanar reformats were obtained. Delayed images were obtained. Comparison: None available Findings: Small right pleural effusion. Bibasilar subsegmental atelectasis. Subtle nodularity of the liver. A 1 cm hypodense lesion within the right lobe of the liver is seen on axial series 2 image 29 and an ill-defined area of hypodensity within the inferior right lobe of the liver measuring approximately 2 cm is seen on axial  series 2 image 38. The gallbladder is physiologically distended and contains multiple small densities within the dependent portion. No gallbladder wall thickening is identified. The stomach, spleen, pancreas, and adrenal glands appear within normal limits. Small hiatal hernia. There is thickening and nodularity of the omentum. There is a large amount of ascites demonstrating simple fluid density. The kidneys enhance uniformly. There is mild right-sided hydronephrosis however no radiopaque or urinary tract calculi identified. No left-sided urinary tract calculi or hydronephrosis. Urinary bladder is suboptimally distended. There is mild perivesicular inflammation. The uterus is surgically absent Abdominal aorta is nonaneurysmal  . No retroperitoneal or abdominal/pelvic lymphadenopathy. No small bowel obstruction. No overt colonic mass or pericolonic inflammation although the large amount of ascites obscures evaluation for inflammation. There is a large amount of stool within the rectosigmoid colon. The appendix is not definitively visualized. No pneumoperitoneum or portal venous gas. No acute or aggressive osseous abnormality. Degenerative changes of the spine. Thickening and nodularity of the omentum concerning for peritoneal metastasis or peritonitis. A large amount of ascites is present. Two ill-defined lesions within the right lobe of the liver measuring 1 cm and 2 cm respectively are nonspecific but most concerning for metastatic lesions. Mild perivesicular inflammation. Correlation with urinalysis recommended to exclude cystitis. Large amount stool within the rectosigmoid colon may represent constipation and/or fecal impaction. Subtle nodularity of the liver suggests cirrhosis. Mild right-sided hydronephrosis without radiopaque calculus. Debris within the gallbladder likely represents gallstones and/or gallbladder sludge. Small right pleural effusion. All CT scans at this facility use dose modulation, iterative reconstruction, and/or weight based dosing when appropriate to reduce radiation dose to as low as reasonably achievable. Us Abdomen Limited    Result Date: 2/3/2021  CLINICAL INDICATION:   Patient with new ascites and abdominal pain, concern for metastatic disease COMPARISON: CT dating February 1, 2021 DISCUSSION:     Transverse and longitudinal images of the right upper quadrant of the abdomen were obtained with color Doppler interrogation of the hepatic vessels. The liver is normal  in echogenicity and no evidence of focal masses seen in the right liver lobe. The hepatic vein, and portal veins are patent. There is normal direction of blood flow in the portal veins towards the liver. The main portal vein, right portal vein, left portal vein, and hepatic vein are patent with normal direction. The common hepatic artery is patent with velocity measuring 72 cm/s. The gallbladder is seen with echogenic gallstones. The gallbladder wall measures 2.4 mm. The portal veins, hepatic vein, and common hepatic artery are patent with normal direction of blood flow. No lesions are seen in the right liver lobe, though ultrasound is limited, and if there is clinical suspicion, MRI of the liver can be obtained. Us Dup Abd Pel Retro Scrot Complete    Result Date: 2/11/2021  EXAMINATION:  US PELVIS COMPLETE CLINICAL HISTORY: Ascites. COMPARISONS:  NONE AVAILABLE TECHNIQUE:  Transabdominal ultrasound of the pelvis. FINDINGS:  The uterus is surgically absent. Both left and right ovaries are surgically absent. There is free fluid within the pelvis. FREE FLUID WITHIN THE PELVIS. EXAMINATION:  US DUP ABD PEL RETRO SCROT COMPLETE CLINICAL HISTORY: Abnormal CT scan. COMPARISONS:  NONE AVAILABLE TECHNIQUE:  Transabdominal ultrasound of the right upper quadrant with both duplex color ultrasound and spectral Doppler ultrasound with interrogation of the hepatic and portal venous system was performed. FINDINGS:  The visualized portion of the right lobe of the liver shows no focal parenchymal abnormalities. The hypoechoic area seen at the inferior medial aspect of the right lobe of liver on CT is not appreciated. Within the field-of-view there are findings of cholelithiasis. There is ascites present. The hepatic veins and portal veins are patent. Normal direction seen within the portal veins and hepatic vein. IMPRESSION:  1. ASCITES IS PRESENT WITHIN THE FIELD-OF-VIEW. 2. CHOLELITHIASIS. 3. UNREMARKABLE SONOGRAPHIC EXAMINATION OF THE HEPATIC AND PORTAL VEIN. Xr Chest Abdomen Ng Placement    Result Date: 2/4/2021  EXAMINATION: LOW CHEST HIGH KUB CLINICAL HISTORY: NG tube placement COMPARISON : The right fourth 2021 FINDINGS: Interval placement of nasogastric tube. The tip is within the stomach. Within the field-of-view there is still prominent dilated loops of large bowel. .     INTERVAL PLACEMENT OF NASOGASTRIC TUBE AS DESCRIBED ABOVE    Us Guided Paracentesis 1.  Status post technically successful ultrasound-guided paracentesis. Manoj Marie is a Female of 69 years age, referred for Ultrasound Guided Paracentesis. PROCEDURE: Survey of the abdomen showed large amount of ascites fluid. After obtaining informed consent, the patient was positioned supine on the sonography table. Using ultrasound, the skin over the left hemiabdomen was locally anesthetized with 1% lidocaine. Following that, a Yueh needle was advanced into the fluid pocket using ultrasound visualization. 4440cc, of clear yellow fluid were aspirated and sent for cytology, and pathology. The needle was removed, and hemostasis was obtained with pressure. A Band-Aid was placed. Post procedure images did not demonstrate hemorrhage at the target site. The patient tolerated the procedure well. The patient left the department in good condition. A radiology nurse was in presence monitoring vital signs, assisting throughout the procedure.      Fl Small Bowel Follow Through Only    Result Date: 2/7/2021 Small bowel follow-through. HISTORY: Postcolonoscopy. COMPARISON: CT abdomen pelvis, February 1, 2021. FINDINGS:  image shows a nasogastric tube in stomach. Marked diffuse dilatation of colon cecum sigmoid colon. 0 minute film shows administration of oral contrast medium via nasogastric tube with contrast medium filling in stomach. 30 minute image shows contrast medium within attenuated distal stomach. Contrast medium visualized filling the duodenum with loop of small bowel coursing into right lower quadrant. Contrast medium also fills normal caliber jejunal loops and epigastric area residual contrast medium from attempted barium enema identified sigmoid colon. 60 minute image shows contrast medium pooling within jejunal loops in the right lower quadrant, and contrast medium to a lesser degree filling jejunal loops found in the left perigastric region. 90 minute image shows mild progression of contrast medium into proximal and mid jejunal loops. Several loops shows little progression of contrast medium passed level of mid jejunum. 4 hour image shows contrast medium with bowel loops in left lower quadrant. However, contrast media now visualized overlying region of the ileocecal valve and portion of ascending colon. 6 hour image shows additional contrast medium entering ascending colon and now entering proximal transverse colon, splenic flexure, and proximal descending colon. There is marked evaluate attenuation of contrast medium within the small bowel. At 8.5 hours, there is contrast medium continues to empty from the small bowel, and now is layering, in the dilated ascending colon. Dilatation of the cecum, measuring approximately 13.7 cm is identified. Residual contrast medium still fills the rectum. Final imaging of 21.5 hours shows near complete evacuation of contrast medium from the small bowel, January dilatation seen to the transverse diameter 17.2 cm, and contrast medium filling the ascending colon. There is still markedly pronounced more distal colon. Dilated small bowel loops, with persistent delay at level of proximal jejunum. Contrast medium visualized ileocecal valve at 4 hours. Following remains air-filled and dilated at that time. Findings suggest stenosis/partial obstruction at level of distal colon and at level of mid jejunum. Fl Barium Enema    Result Date: 2/8/2021  COMPARISON: February 4, 2021 Fluoroscopic time. 1.6 minutes 17 images were obtained. HISTORY:    Abdominal pain, possible obstruction PATIENT NAME: JOEY SALDANA: TECHNIQUE: FL BARIUM ENEMA FINDINGS: Air is seen within bowel. An NG tube is seen in place with tip in the area of the stomach. Dilated loops of bowel are again seen. The most dilated loop measures up to 11.7 cm. Barium was introduced into the rectum. When the barium went into the distal sigmoid colon, patient was  unable to tolerate the barium and asked for the procedure to be stop. The barium was drained from the colon. Incomplete barium enema. Patient unable to tolerate procedure. There is diffuse dilation of the bowel.     Electronically signed by Rebeca Lux MD on 2/14/2021 at 1:22 PM

## 2021-02-15 LAB
ANION GAP SERPL CALCULATED.3IONS-SCNC: 10 MEQ/L (ref 9–15)
BASOPHILS ABSOLUTE: 0 K/UL (ref 0–0.2)
BASOPHILS RELATIVE PERCENT: 0.3 %
BUN BLDV-MCNC: 16 MG/DL (ref 8–23)
CALCIUM SERPL-MCNC: 7.5 MG/DL (ref 8.5–9.9)
CHLORIDE BLD-SCNC: 110 MEQ/L (ref 95–107)
CO2: 21 MEQ/L (ref 20–31)
CREAT SERPL-MCNC: 0.64 MG/DL (ref 0.5–0.9)
EOSINOPHILS ABSOLUTE: 0.2 K/UL (ref 0–0.7)
EOSINOPHILS RELATIVE PERCENT: 1 %
GFR AFRICAN AMERICAN: >60
GFR NON-AFRICAN AMERICAN: >60
GLUCOSE BLD-MCNC: 114 MG/DL (ref 60–115)
GLUCOSE BLD-MCNC: 120 MG/DL (ref 60–115)
GLUCOSE BLD-MCNC: 123 MG/DL (ref 60–115)
GLUCOSE BLD-MCNC: 126 MG/DL (ref 60–115)
GLUCOSE BLD-MCNC: 126 MG/DL (ref 70–99)
GLUCOSE BLD-MCNC: 131 MG/DL (ref 60–115)
GLUCOSE BLD-MCNC: 132 MG/DL (ref 60–115)
HCT VFR BLD CALC: 30.1 % (ref 37–47)
HEMOGLOBIN: 9.8 G/DL (ref 12–16)
LYMPHOCYTES ABSOLUTE: 1.4 K/UL (ref 1–4.8)
LYMPHOCYTES RELATIVE PERCENT: 8.3 %
MCH RBC QN AUTO: 28.8 PG (ref 27–31.3)
MCHC RBC AUTO-ENTMCNC: 32.5 % (ref 33–37)
MCV RBC AUTO: 88.6 FL (ref 82–100)
MONOCYTES ABSOLUTE: 0.6 K/UL (ref 0.2–0.8)
MONOCYTES RELATIVE PERCENT: 3.6 %
NEUTROPHILS ABSOLUTE: 14.7 K/UL (ref 1.4–6.5)
NEUTROPHILS RELATIVE PERCENT: 86.8 %
PDW BLD-RTO: 14.2 % (ref 11.5–14.5)
PERFORMED ON: ABNORMAL
PERFORMED ON: NORMAL
PLATELET # BLD: 340 K/UL (ref 130–400)
POTASSIUM REFLEX MAGNESIUM: 4 MEQ/L (ref 3.4–4.9)
RBC # BLD: 3.39 M/UL (ref 4.2–5.4)
SODIUM BLD-SCNC: 141 MEQ/L (ref 135–144)
WBC # BLD: 16.9 K/UL (ref 4.8–10.8)

## 2021-02-15 PROCEDURE — 6370000000 HC RX 637 (ALT 250 FOR IP): Performed by: COLON & RECTAL SURGERY

## 2021-02-15 PROCEDURE — 6360000002 HC RX W HCPCS: Performed by: FAMILY MEDICINE

## 2021-02-15 PROCEDURE — 85025 COMPLETE CBC W/AUTO DIFF WBC: CPT

## 2021-02-15 PROCEDURE — 2580000003 HC RX 258: Performed by: INTERNAL MEDICINE

## 2021-02-15 PROCEDURE — 36415 COLL VENOUS BLD VENIPUNCTURE: CPT

## 2021-02-15 PROCEDURE — 99213 OFFICE O/P EST LOW 20 MIN: CPT

## 2021-02-15 PROCEDURE — 97535 SELF CARE MNGMENT TRAINING: CPT

## 2021-02-15 PROCEDURE — 99232 SBSQ HOSP IP/OBS MODERATE 35: CPT | Performed by: RADIOLOGY

## 2021-02-15 PROCEDURE — 6360000002 HC RX W HCPCS: Performed by: COLON & RECTAL SURGERY

## 2021-02-15 PROCEDURE — 6360000002 HC RX W HCPCS: Performed by: INTERNAL MEDICINE

## 2021-02-15 PROCEDURE — 1210000000 HC MED SURG R&B

## 2021-02-15 PROCEDURE — 80048 BASIC METABOLIC PNL TOTAL CA: CPT

## 2021-02-15 RX ADMIN — ENOXAPARIN SODIUM 40 MG: 40 INJECTION SUBCUTANEOUS at 06:41

## 2021-02-15 RX ADMIN — HYDROMORPHONE HYDROCHLORIDE 0.5 MG: 1 INJECTION, SOLUTION INTRAMUSCULAR; INTRAVENOUS; SUBCUTANEOUS at 03:05

## 2021-02-15 RX ADMIN — LORAZEPAM 0.5 MG: 2 INJECTION INTRAMUSCULAR at 18:43

## 2021-02-15 RX ADMIN — HYDROMORPHONE HYDROCHLORIDE 0.5 MG: 1 INJECTION, SOLUTION INTRAMUSCULAR; INTRAVENOUS; SUBCUTANEOUS at 20:58

## 2021-02-15 RX ADMIN — PANTOPRAZOLE SODIUM 40 MG: 40 TABLET, DELAYED RELEASE ORAL at 06:42

## 2021-02-15 RX ADMIN — HYDROMORPHONE HYDROCHLORIDE 0.5 MG: 1 INJECTION, SOLUTION INTRAMUSCULAR; INTRAVENOUS; SUBCUTANEOUS at 15:13

## 2021-02-15 RX ADMIN — PIPERACILLIN AND TAZOBACTAM 3375 MG: 3; .375 INJECTION, POWDER, LYOPHILIZED, FOR SOLUTION INTRAVENOUS at 06:40

## 2021-02-15 RX ADMIN — PIPERACILLIN AND TAZOBACTAM 3375 MG: 3; .375 INJECTION, POWDER, LYOPHILIZED, FOR SOLUTION INTRAVENOUS at 14:38

## 2021-02-15 RX ADMIN — ONDANSETRON 4 MG: 2 INJECTION INTRAMUSCULAR; INTRAVENOUS at 15:13

## 2021-02-15 RX ADMIN — HYDROMORPHONE HYDROCHLORIDE 0.5 MG: 1 INJECTION, SOLUTION INTRAMUSCULAR; INTRAVENOUS; SUBCUTANEOUS at 10:43

## 2021-02-15 RX ADMIN — LORAZEPAM 0.5 MG: 2 INJECTION INTRAMUSCULAR at 10:43

## 2021-02-15 RX ADMIN — ONDANSETRON 4 MG: 2 INJECTION INTRAMUSCULAR; INTRAVENOUS at 09:51

## 2021-02-15 ASSESSMENT — PAIN SCALES - GENERAL
PAINLEVEL_OUTOF10: 3
PAINLEVEL_OUTOF10: 7
PAINLEVEL_OUTOF10: 7
PAINLEVEL_OUTOF10: 4
PAINLEVEL_OUTOF10: 10
PAINLEVEL_OUTOF10: 4

## 2021-02-15 NOTE — PROGRESS NOTES
Hospitalist Progress Note      PCP: Lukas Crow MD    Date of Admission: 2/1/2021    Chief Complaint:    Chief Complaint   Patient presents with    Fatigue     sent by dr Michelle Barone for dehydration work up , patient has been vomiting and not keeping anything down and is very fatigued     Subjective:  Patient resting in bed, no cp, sob. Fatigued postop. Medications:  Reviewed    Infusion Medications    sodium chloride 50 mL/hr at 02/14/21 1614    dextrose       Scheduled Medications    sodium chloride flush  10 mL Intravenous 2 times per day    piperacillin-tazobactam  3,375 mg Intravenous Q8H    [Held by provider] insulin glargine  10 Units Subcutaneous Nightly    insulin lispro  0-6 Units Subcutaneous Q6H    pantoprazole  40 mg Oral QAM AC    [Held by provider] spironolactone  50 mg Oral Daily    [Held by provider] furosemide  20 mg Oral Daily    sodium chloride flush  10 mL Intravenous 2 times per day    enoxaparin  40 mg Subcutaneous Daily    senna  2 tablet Oral Nightly     PRN Meds: metoclopramide, LORazepam, HYDROmorphone, sodium chloride flush, glucose, dextrose, glucagon (rDNA), dextrose, potassium chloride, magnesium sulfate, sodium phosphate IVPB **OR** sodium phosphate IVPB, benzocaine, sodium chloride flush, promethazine **OR** ondansetron, acetaminophen **OR** acetaminophen      Intake/Output Summary (Last 24 hours) at 2/15/2021 0118  Last data filed at 2/14/2021 1804  Gross per 24 hour   Intake 2356 ml   Output 1150 ml   Net 1206 ml     Exam:    /64   Pulse 87   Temp 97.1 °F (36.2 °C) (Oral)   Resp 18   Ht 5' 5\" (1.651 m)   Wt 150 lb (68 kg)   SpO2 100%   BMI 24.96 kg/m²     General appearance: NAD  HEENT: Conjunctivae/corneas clear. Neck:  Trachea midline. Respiratory:  Normal respiratory effort. Clear to auscultation  Cardiovascular: Regular rate and rhythm   Abdomen: Well wrapped, no drainage  Musculoskeletal: No clubbing, cyanosis or edema bilaterally. Neuro: Non Focal  Capillary Refill: Brisk,< 3 seconds   Peripheral Pulses: +2 palpable, equal bilaterally     Labs:   Recent Labs     02/12/21 0619 02/13/21 0628 02/14/21 0633   WBC 16.3* 14.1* 14.2*   HGB 9.7* 9.7* 10.0*   HCT 29.0* 29.9* 30.4*    303 342     Recent Labs     02/12/21 0619 02/13/21 0628 02/14/21 0633    140 141   K 3.4 3.9 3.9   * 111* 110*   CO2 21 21 23   BUN 23 16 16   CREATININE 0.79 0.64 0.75   CALCIUM 7.5* 7.3* 7.6*     No results for input(s): AST, ALT, BILIDIR, BILITOT, ALKPHOS in the last 72 hours. No results for input(s): INR in the last 72 hours. No results for input(s): Ibeth Journey in the last 72 hours. Urinalysis:      Lab Results   Component Value Date    NITRU Negative 02/01/2021    BLOODU Negative 02/01/2021    SPECGRAV 1.065 02/01/2021    GLUCOSEU Negative 02/01/2021     Radiology:  FL BARIUM ENEMA   Final Result   Incomplete barium enema. Patient unable to tolerate procedure. There is diffuse dilation of the bowel. FL SMALL BOWEL FOLLOW THROUGH ONLY   Final Result      Dilated small bowel loops, with persistent delay at level of proximal jejunum. Contrast medium visualized ileocecal valve at 4 hours. Following remains air-filled and dilated at that time. Findings suggest stenosis/partial obstruction at level of distal    colon and at level of mid jejunum. XR CHEST ABDOMEN NG PLACEMENT   Final Result   INTERVAL PLACEMENT OF NASOGASTRIC TUBE AS DESCRIBED ABOVE      XR ABDOMEN (KUB) (SINGLE AP VIEW)   Final Result   DISTENDED DILATED LOOPS OF LARGE BOWEL WITH A TRANSITION POINT IN THE REGION OF THE DESCENDING COLON, SIGMOID. FINDINGS COULD SUGGEST THAT OF POSSIBLE HIGH-GRADE OBSTRUCTION. FURTHER EVALUATION IS WARRANTED. US PELVIS COMPLETE   Final Result   FREE FLUID WITHIN THE PELVIS. EXAMINATION:  US DUP ABD PEL RETRO SCROT COMPLETE      CLINICAL HISTORY: Abnormal CT scan.       COMPARISONS:  NONE AVAILABLE TECHNIQUE:  Transabdominal ultrasound of the right upper quadrant with both duplex color ultrasound and spectral Doppler ultrasound with interrogation of the hepatic and portal venous system was performed. FINDINGS:     The visualized portion of the right lobe of the liver shows no focal parenchymal abnormalities. The hypoechoic area seen at the inferior medial aspect of the right lobe of liver on CT is not appreciated. Within the field-of-view there are findings of cholelithiasis. There is ascites present. The hepatic veins and portal veins are patent. Normal direction seen within the portal veins and hepatic vein. IMPRESSION:        1. ASCITES IS PRESENT WITHIN THE FIELD-OF-VIEW. 2. CHOLELITHIASIS. 3. UNREMARKABLE SONOGRAPHIC EXAMINATION OF THE HEPATIC AND PORTAL VEIN. US DUP ABD PEL RETRO SCROT COMPLETE   Final Result   FREE FLUID WITHIN THE PELVIS. EXAMINATION:  US DUP ABD PEL RETRO SCROT COMPLETE      CLINICAL HISTORY: Abnormal CT scan. COMPARISONS:  NONE AVAILABLE      TECHNIQUE:  Transabdominal ultrasound of the right upper quadrant with both duplex color ultrasound and spectral Doppler ultrasound with interrogation of the hepatic and portal venous system was performed. FINDINGS:     The visualized portion of the right lobe of the liver shows no focal parenchymal abnormalities. The hypoechoic area seen at the inferior medial aspect of the right lobe of liver on CT is not appreciated. Within the field-of-view there are findings of cholelithiasis. There is ascites present. The hepatic veins and portal veins are patent. Normal direction seen within the portal veins and hepatic vein. IMPRESSION:        1. ASCITES IS PRESENT WITHIN THE FIELD-OF-VIEW. 2. CHOLELITHIASIS. 3. UNREMARKABLE SONOGRAPHIC EXAMINATION OF THE HEPATIC AND PORTAL VEIN.       US GUIDED PARACENTESIS   Final Result 1.  Status post technically successful ultrasound-guided paracentesis. Jordy Ferguson is a Female of 69 years age, referred for Ultrasound Guided Paracentesis. PROCEDURE: Survey of the abdomen showed large amount of ascites fluid. After obtaining informed consent, the patient was positioned supine on the sonography table. Using ultrasound, the skin over the left hemiabdomen was locally anesthetized with 1% lidocaine. Following that, a Yueh needle was advanced into the fluid    pocket using ultrasound visualization. 4440cc, of clear yellow fluid were aspirated and sent for cytology, and pathology. The needle was removed, and hemostasis was obtained with pressure. A Band-Aid was placed. Post procedure images did not demonstrate hemorrhage at the target site. The patient tolerated the procedure well. The patient left the department in good condition. A radiology nurse was in presence monitoring vital signs, assisting throughout the procedure. US ABDOMEN LIMITED   Final Result   The portal veins, hepatic vein, and common hepatic artery are patent with normal direction of blood flow. No lesions are seen in the right liver lobe, though ultrasound is limited, and if there is clinical suspicion, MRI of the liver can be    obtained. CT ABDOMEN PELVIS W IV CONTRAST Additional Contrast? None   Final Result      Thickening and nodularity of the omentum concerning for peritoneal metastasis or peritonitis. A large amount of ascites is present. Two ill-defined lesions within the right lobe of the liver measuring 1 cm and 2 cm respectively are nonspecific but most concerning for metastatic lesions. Mild perivesicular inflammation. Correlation with urinalysis recommended to exclude cystitis. Large amount stool within the rectosigmoid colon may represent constipation and/or fecal impaction. Subtle nodularity of the liver suggests cirrhosis. Mild right-sided hydronephrosis without radiopaque calculus. Debris within the gallbladder likely represents gallstones and/or gallbladder sludge. Small right pleural effusion. All CT scans at this facility use dose modulation, iterative reconstruction, and/or weight based dosing when appropriate to reduce radiation dose to as low as reasonably achievable. Assessment/Plan:    1. 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 Possibly pancreatic vs cholangiocarcinoma based on CA 19-9; plan for MRCP when more stable; other sources being worked up by oncology  2. High grade obstruction of descending colon: Will need colectomy; patient is deciding; if she is agreeable then plan for OR tomorrow at 3pm  1. 2/8 - s/p colectomy with diverting ostomy, moved to icu for extensive procedure post op care. 2. 2/9 - pod 1, sips, cont post op care per surgery; ICU as required levophed for post op hypotension  3. 2/10 - pod 2, cont routine care, clears, tube feeds, monitor drain output  4. 2/11 - tube feeds per dietary  5. 2/12 - cont tube feeds per surgery  6. 2/13 - slowed tube feeds due to intolerance. 7. 2/14 - remains on 20cc/hr, surgery managing  3. Hyponatremia/BALA  1. 2/9 - both improved with iv fluids  4. Right sided pleural effusion:  Likely from the ascites; ascites drained  5. Functional Status: Fall precautions. Up with assistance. PT OT  6. Diet: NPO  7. DVT ppx: Lovenox SCDs  8. Disposition: Dependent on hospital course. Will discharge once medically stable. ÁNGELA on board for discharge planning. 1. 2/11 - await final nutrition plan post-op, dc planning soon  2. 2/12 - planning for dc home 1-2 days once home care planned  3. 2/13 - patient requiring max assist, will need snf when tube feeds are adequate.   4. 2/14 - dc planning to snf 1-2 days    Active Hospital Problems    Diagnosis Date Noted  Severe malnutrition ([facility] [address] [E43] 02/10/2021    Fatigue [R53.83]     Malignant ascites [R18.0] 02/01/2021     Additional work up or/and treatment plan may be added today or then after based on clinical progression. I am managing a portion of pt care. Some medical issues are handled by other specialists. Additional work up and treatment should be done in out pt setting by pt PCP and other out pt providers. In addition to examining and evaluating pt, I spent additional time explaining care, normal and abnormal findings, and treatment plan. All of pt questions were answered. Counseling, diet and education were  provided. Case will be discussed with nursing staff when appropriate. Family will be updated if and when appropriate.       Diet: DIET CLEAR LIQUID;  Diet Tube Feed Continuous/Cyclic w/ Diet    Code Status: Full Code    PT/OT Eval     Electronically signed by Dejon Rivera MD on 2/15/2021 at 1:18 AM

## 2021-02-15 NOTE — PROGRESS NOTES
[address] [address] is scheduled to meet with patients spouse Kelsi Marlene today at 1600 at [id number] [facility] to provide informational referral.

## 2021-02-15 NOTE — PROGRESS NOTES
Shift assessment completed, patient alert and oriented x2. Patient tolerating tube feeding, medicated prn with dilaudid per orders. Ileostomy producing loose brown stool, call light within reach. Will continue to monitor.

## 2021-02-15 NOTE — PROGRESS NOTES
Vascular and Interventional Radiology   Hugo Alvarez M.D. [facility]      Chief Complaint:   Chief Complaint   Patient presents with    Fatigue     sent by dr Rosalina Hayden for dehydration work up , patient has been vomiting and not keeping anything down and is very fatigued        Subjective: Alli Boyle is a 69 y.o. female status post paracentesis for malignant ascites and abdominal carcinomatosis. Patient is not very responsive, and sleeping in bed. There is no further ascites fluid in the abdomen. Objective:   /63   Pulse 91   Temp 97 °F (36.1 °C) (Oral)   Resp 18   Ht 5' 5\" (1.651 m)   Wt 150 lb (68 kg)   SpO2 100%   BMI 24.96 kg/m²     Physical:   not in acute distress    Normocephalic, without obvious abnormality, atraumatic    Neck supple. No adenopathy. Thyroid symmetric, normal size, and without nodularity    normal rate, regular rhythm, no murmurs and no gallops    chest clear, no wheezing, rales, normal symmetric air entry    Abdomen: No evidence of distention, tenderness, soft, normal bowel sounds. Lab:  Recent Labs     02/15/21  0624   WBC 16.9*   RBC 3.39*   HGB 9.8*   HCT 30.1*   MCV 88.6   MCH 28.8   MCHC 32.5*   RDW 14.2          No results for input(s): INR, PROTIME in the last 72 hours. Recent Labs     02/13/21  0628 02/14/21  0633 02/15/21  0624   CREATININE 0.64 0.75 0.64       Assessment: Alli Boyle is a 69 y.o. female status post ascites paracentesis previously for malignant ascites. No further accumulation of fluid. Plan: No further treatment is needed from interventional radiology. Patient will continue to be followed by gastroenterology and medical care team.    Thank you for the opportunity to care for Mrs. Yolande Rosas, if we can be of any further service, please let us know. Signing off. Hugo Alvarez M.D. [facility]  2/15/2021,  12:51 PM

## 2021-02-15 NOTE — PROGRESS NOTES
Assessment completed this shift. Alert and oriented x4. Ativan and Dilaudid given as ordered for c/o anxiety and pain. Loose, watery brown stool noted in ileostomy bag. Jevity 1.2 infusing through PEG Turned and repositioned for comfort.   Electronically signed by Rosemary Chan RN on 2/15/2021 at 11:46 AM

## 2021-02-15 NOTE — PROGRESS NOTES
Physical Therapy Med Surg Daily Treatment Note  Facility/Department:  [facility] UNIT  [facility]       NAME: Aydin Hernandez  : 1950 (69 y.o.)  MRN: [id number]  CODE STATUS: Full Code    Date of Service: 2/15/2021    Patient Diagnosis(es): Malignant ascites [R18.0]   Chief Complaint   Patient presents with    Fatigue     sent by dr Imtiaz Larson for dehydration work up , patient has been vomiting and not keeping anything down and is very fatigued     Patient Active Problem List    Diagnosis Date Noted    Severe malnutrition ([address] 02/10/2021    Fatigue     Malignant ascites 2021        Past Medical History:   Diagnosis Date    Anxiety     panic attacks    Fibroids     has complete hysterctomy    Kidney stone     14 years ago     Past Surgical History:   Procedure Laterality Date    HYSTERECTOMY, TOTAL ABDOMINAL      LAPAROTOMY N/A 2021    EXPLORATORY LAPAROTOMY, SUBTOTAL COLECTOMY,  END ILLEOSTOMY, INSERTION GASTROSTOMY TUBE. performed by Julia Crowe MD at [address] [place] [address] 2021    4,440 ml removed by Dr. Lucy Reynolds 2021    SIGMOIDOSCOPY BIOPSY FLEXIBLE performed by Sal Julian MD at [facility]       Restrictions  Restrictions/Precautions: Fall Risk  Position Activity Restriction  Other position/activity restrictions: NPO; abdominal precautions s/p lap    SUBJECTIVE   Subjective  Subjective: Pt's dtr present during tx. Pt agreeable to therapy with encouragement.     Pre-Session Pain Report  Pre Treatment Pain Screening  Pain at present: 0  Scale Used: Numeric Score  Intervention List: Patient able to continue with treatment  Pain Screening  Patient Currently in Pain: Denies       Post-Session Pain Report  Pain Assessment  Pain Assessment: 0-10  Pain Level: 0         OBJECTIVE   Orientation  Overall Orientation Status: Within Functional Limits    Bed mobility  Rolling to Left: Moderate assistance Rolling to Right: Moderate assistance  Supine to Sit: Moderate assistance;2 Person assistance  Sit to Supine: Moderate assistance;2 Person assistance  Scooting: Maximal assistance;2 Person assistance  Comment: Bed elevated with use of rail    Neuromuscular Education  NDT Treatment: Sitting  Neuromuscular Comments: Static sitting EOB approx 2 min with 0-2 UE support, sitting reaching OBOS       ASSESSMENT   Assessment: Pt able to tolerate sitting EOB this date, improvement from last visit in which pt became too nauseous to continue midway through supine to sit transfer. Continues to require VC/TC for sequencing with bed mobility. Discharge Recommendations:  Continue to assess pending progress, Patient would benefit from continued therapy after discharge    Goals  Long term goals  Long term goal 1: Bed mobiity with supervision, demonstrating log roll  Long term goal 2: Functional transfers with supervision  Long term goal 3: Amb >/= 50ft with LRAD and supervision to navigate home home environment  Patient Goals   Patient goals : \"get better\"    PLAN    Times per week: 3-6        AMPAC (6 CLICK) BASIC MOBILITY  AM-PAC Inpatient Mobility Raw Score : 9     Therapy Time   Individual   Time In 1302   Time Out 1318   Minutes 16         Activity Minutes Units   Neuro Ed 6 0   Transfers/BM 10 1            Kenyon Cason PTA, 02/15/21 at 1:22 PM         Definitions for assistance levels  Independent = pt does not require any physical supervision or assistance from another person for activity completion. Device may be needed.   Stand by assistance = pt requires verbal cues or instructions from another person, close to but not touching, to perform the activity  Minimal assistance= pt performs 75% or more of the activity; assistance is required to complete the activity  Moderate assistance= pt performs 50% of the activity; assistance is required to complete the activity Maximal assistance = pt performs 25% of the activity; assistance is required to complete the activity  Dependent = pt requires total physical assistance to accomplish the task

## 2021-02-15 NOTE — PROGRESS NOTES
Wound Ostomy Continence Nurse  Ostomy Referral Follow-up Progress Note      NAME:  Wing Mcclain  MEDICAL RECORD NUMBER:  [id number]  AGE: 69 y.o. GENDER:  female  :  1950  TODAY'S DATE:  2/15/2021    Subjective:    Wing Mcclain is a 69 y.o. female referred by:   [x] Physician  [] Nursing  [] Other:     ileostomy and Established    Objective    /63   Pulse 91   Temp 97 °F (36.1 °C) (Oral)   Resp 18   Ht 5' 5\" (1.651 m)   Wt 150 lb (68 kg)   SpO2 100%   BMI 24.96 kg/m²     Emmanuel Risk Score Emmanuel Scale Score: 18    Assessment   Surgeon - Dr. Kenneth Saha       Ileostomy Ileostomy RLQ (Active)   Stomal Appliance 1 piece;Clean;Dry; Intact 02/15/21 1339   Flange Size (inches) 1.25 Inches 02/15/21 1339   Stoma  Assessment Red;Moist;Protrudes 02/15/21 1339   Mucocutaneous Junction Intact 02/15/21 1339   Peristomal Assessment Clean; Intact 21 1055   Treatment Bag change;Site care 21 0910   Stool Color Brown 02/15/21 1339   Stool Appearance Loose 02/15/21 1339   Stool Amount Medium 02/15/21 1339   Output (mL) 300 ml 02/15/21 0650   Number of days: 6     Patient laying in bed at this time, drowsy, slow to respond. No change in stoma appearance and Ileostomy is functioning well, loose brown stool in the bag. Patient's daughter, Dick Chowdary, is at the bedside and wanted to learn how to empty the bed - education and demonstration provided at this time. Patient's daughter provided return demonstration and did well. Supplies at the bedside and current appliance is clean and intact. Will continue to follow.  Discharge plan pending, patient and family have meeting with Hospice this afternoon    Intake/Output Summary (Last 24 hours) at 2/15/2021 1340  Last data filed at 2/15/2021 0840  Gross per 24 hour   Intake 2550 ml   Output 1350 ml   Net 1200 ml       Plan:   Plan for Ostomy Care: See above      Ostomy Plan of Care [x] Supplies/Instructions left in room  [] Patient using home supplies  [x] Brand/supplies at bedside - [employer] Xpro    Current Diet: DIET CLEAR LIQUID;  Diet Tube Feed Continuous/Cyclic w/ Diet  Dietician consult:  Yes    Discharge Plan:  Placement for patient upon discharge: Undetermined     Outpatient visit plan No  Supplies given Yes   Samples requested Yes    Referrals:  []   [] [facility]  [] Supplies  [] Other      Patient/Caregiver Teaching:  Written Instructions given to patient/family: Patient's daughter, Matilde Graham provided:  [] Reviewed GI and A&P        [] Supplies  [x] Pouch emptying      [] Manipulate closure  [x] Routine Care         [] Comment  [x] Pouch maintenance           Level of patient/caregiver understanding able to:  [] Indicates understanding       [] Needs reinforcement  [] Unsuccessful      [] Verbal Understanding  [x] Demonstrated understanding       [] No evidence of learning  [] Refused teaching         [] N/A    Electronically signed by YURIY BoyerN, RN, CWOCN on 2/15/2021 at 1:44 PM

## 2021-02-16 LAB
ANION GAP SERPL CALCULATED.3IONS-SCNC: 10 MEQ/L (ref 9–15)
BASOPHILS ABSOLUTE: 0 K/UL (ref 0–0.2)
BASOPHILS RELATIVE PERCENT: 0.5 %
BUN BLDV-MCNC: 17 MG/DL (ref 8–23)
CALCIUM SERPL-MCNC: 7.8 MG/DL (ref 8.5–9.9)
CHLORIDE BLD-SCNC: 110 MEQ/L (ref 95–107)
CO2: 19 MEQ/L (ref 20–31)
CREAT SERPL-MCNC: 0.62 MG/DL (ref 0.5–0.9)
EOSINOPHILS ABSOLUTE: 0.2 K/UL (ref 0–0.7)
EOSINOPHILS RELATIVE PERCENT: 1 %
GFR AFRICAN AMERICAN: >60
GFR NON-AFRICAN AMERICAN: >60
GLUCOSE BLD-MCNC: 155 MG/DL (ref 60–115)
GLUCOSE BLD-MCNC: 159 MG/DL (ref 60–115)
GLUCOSE BLD-MCNC: 178 MG/DL (ref 70–99)
GLUCOSE BLD-MCNC: 179 MG/DL (ref 60–115)
GLUCOSE BLD-MCNC: 194 MG/DL (ref 60–115)
HCT VFR BLD CALC: 32.4 % (ref 37–47)
HEMOGLOBIN: 10.2 G/DL (ref 12–16)
LYMPHOCYTES ABSOLUTE: 1.3 K/UL (ref 1–4.8)
LYMPHOCYTES RELATIVE PERCENT: 6 %
MCH RBC QN AUTO: 28.4 PG (ref 27–31.3)
MCHC RBC AUTO-ENTMCNC: 31.5 % (ref 33–37)
MCV RBC AUTO: 90.3 FL (ref 82–100)
METAMYELOCYTES RELATIVE PERCENT: 1 %
MONOCYTES ABSOLUTE: 0.2 K/UL (ref 0.2–0.8)
MONOCYTES RELATIVE PERCENT: 4.2 %
MYELOCYTE PERCENT: 1 %
NEUTROPHILS ABSOLUTE: 20 K/UL (ref 1.4–6.5)
NEUTROPHILS RELATIVE PERCENT: 91 %
PDW BLD-RTO: 14.9 % (ref 11.5–14.5)
PERFORMED ON: ABNORMAL
PLATELET # BLD: 351 K/UL (ref 130–400)
PLATELET SLIDE REVIEW: NORMAL
POTASSIUM REFLEX MAGNESIUM: 4.3 MEQ/L (ref 3.4–4.9)
PROMONOCYTES: 1 %
RBC # BLD: 3.59 M/UL (ref 4.2–5.4)
RBC # BLD: NORMAL 10*6/UL
SODIUM BLD-SCNC: 139 MEQ/L (ref 135–144)
WBC # BLD: 21.5 K/UL (ref 4.8–10.8)

## 2021-02-16 PROCEDURE — 2580000003 HC RX 258: Performed by: FAMILY MEDICINE

## 2021-02-16 PROCEDURE — 6360000002 HC RX W HCPCS: Performed by: FAMILY MEDICINE

## 2021-02-16 PROCEDURE — 6360000002 HC RX W HCPCS: Performed by: COLON & RECTAL SURGERY

## 2021-02-16 PROCEDURE — 2580000003 HC RX 258: Performed by: INTERNAL MEDICINE

## 2021-02-16 PROCEDURE — 85025 COMPLETE CBC W/AUTO DIFF WBC: CPT

## 2021-02-16 PROCEDURE — 99213 OFFICE O/P EST LOW 20 MIN: CPT

## 2021-02-16 PROCEDURE — 1210000000 HC MED SURG R&B

## 2021-02-16 PROCEDURE — 36415 COLL VENOUS BLD VENIPUNCTURE: CPT

## 2021-02-16 PROCEDURE — 6370000000 HC RX 637 (ALT 250 FOR IP): Performed by: COLON & RECTAL SURGERY

## 2021-02-16 PROCEDURE — 80048 BASIC METABOLIC PNL TOTAL CA: CPT

## 2021-02-16 RX ADMIN — LORAZEPAM 0.5 MG: 2 INJECTION INTRAMUSCULAR at 16:15

## 2021-02-16 RX ADMIN — METOCLOPRAMIDE HYDROCHLORIDE 5 MG: 5 INJECTION INTRAMUSCULAR; INTRAVENOUS at 21:42

## 2021-02-16 RX ADMIN — METOCLOPRAMIDE HYDROCHLORIDE 5 MG: 5 INJECTION INTRAMUSCULAR; INTRAVENOUS at 04:41

## 2021-02-16 RX ADMIN — HYDROMORPHONE HYDROCHLORIDE 0.5 MG: 1 INJECTION, SOLUTION INTRAMUSCULAR; INTRAVENOUS; SUBCUTANEOUS at 01:43

## 2021-02-16 RX ADMIN — INSULIN LISPRO 1 UNITS: 100 INJECTION, SOLUTION INTRAVENOUS; SUBCUTANEOUS at 12:38

## 2021-02-16 RX ADMIN — HYDROMORPHONE HYDROCHLORIDE 0.5 MG: 1 INJECTION, SOLUTION INTRAMUSCULAR; INTRAVENOUS; SUBCUTANEOUS at 12:37

## 2021-02-16 RX ADMIN — ONDANSETRON 4 MG: 2 INJECTION INTRAMUSCULAR; INTRAVENOUS at 01:51

## 2021-02-16 RX ADMIN — HYDROMORPHONE HYDROCHLORIDE 0.5 MG: 1 INJECTION, SOLUTION INTRAMUSCULAR; INTRAVENOUS; SUBCUTANEOUS at 17:31

## 2021-02-16 RX ADMIN — ONDANSETRON 4 MG: 2 INJECTION INTRAMUSCULAR; INTRAVENOUS at 20:00

## 2021-02-16 RX ADMIN — ONDANSETRON 4 MG: 2 INJECTION INTRAMUSCULAR; INTRAVENOUS at 08:08

## 2021-02-16 RX ADMIN — SODIUM CHLORIDE: 9 INJECTION, SOLUTION INTRAVENOUS at 04:57

## 2021-02-16 RX ADMIN — Medication 10 ML: at 08:08

## 2021-02-16 RX ADMIN — LORAZEPAM 0.5 MG: 2 INJECTION INTRAMUSCULAR at 08:08

## 2021-02-16 RX ADMIN — INSULIN LISPRO 1 UNITS: 100 INJECTION, SOLUTION INTRAVENOUS; SUBCUTANEOUS at 06:20

## 2021-02-16 RX ADMIN — PANTOPRAZOLE SODIUM 40 MG: 40 TABLET, DELAYED RELEASE ORAL at 06:22

## 2021-02-16 RX ADMIN — HYDROMORPHONE HYDROCHLORIDE 0.5 MG: 1 INJECTION, SOLUTION INTRAMUSCULAR; INTRAVENOUS; SUBCUTANEOUS at 08:08

## 2021-02-16 RX ADMIN — LORAZEPAM 0.5 MG: 2 INJECTION INTRAMUSCULAR at 22:51

## 2021-02-16 RX ADMIN — ENOXAPARIN SODIUM 40 MG: 40 INJECTION SUBCUTANEOUS at 08:08

## 2021-02-16 ASSESSMENT — PAIN SCALES - GENERAL
PAINLEVEL_OUTOF10: 5
PAINLEVEL_OUTOF10: 4

## 2021-02-16 NOTE — PROGRESS NOTES
BP (!) 132/59   Pulse 89   Temp 97.8 °F (36.6 °C) (Oral)   Resp 18   Ht 5' 5\" (1.651 m)   Wt 189 lb 4.8 oz (85.9 kg)   SpO2 98%   BMI 31.50 kg/m²   Temp (24hrs), Av °F (36.7 °C), Min:97.8 °F (36.6 °C), Max:98.2 °F (36.8 °C)      General appearance: Very weak and sleepy. Answer some question. Falling asleep easily. Mental Status: oriented to person, place and time  Lungs: clear to auscultation bilaterally, normal effort  Heart: regular rate and rhythm, no murmur  Abdomen: soft, nondistended, bowel sounds present, no masses. ileostomy   Extremities: 3+ pitting bilateral lower extremity edema   skin: no gross lesions, rashes    Data:     Labs:  Recent Labs     02/15/21  0624 21  0618   WBC 16.9* 21.5*   HGB 9.8* 10.2*    351     Recent Labs     02/15/21  0624 21  0618    139   K 4.0 4.3   * 110*   CO2 21 19*   BUN 16 17   CREATININE 0.64 0.62   GLUCOSE 126* 178*     No results for input(s): AST, ALT, ALB, BILITOT, ALKPHOS in the last 72 hours.     Current Facility-Administered Medications   Medication Dose Route Frequency Provider Last Rate Last Admin    metoclopramide (REGLAN) injection 5 mg  5 mg Intravenous Q6H PRN Joey Archer MD   5 mg at 21 0441    LORazepam (ATIVAN) injection 0.5 mg  0.5 mg Intravenous Q6H PRN Steve Lundborg, MD   0.5 mg at 21 1341    0.9 % sodium chloride infusion   Intravenous Continuous Steve Lundborg, MD 50 mL/hr at 21 0457 New Bag at 21 0457    HYDROmorphone (DILAUDID) injection 0.5 mg  0.5 mg Intravenous Q4H PRN Steve Lundborg, MD   0.5 mg at 21 1237    sodium chloride flush 0.9 % injection 10 mL  10 mL Intravenous 2 times per day Sangeetha Kitten, MD   10 mL at 21 0808    sodium chloride flush 0.9 % injection 10 mL  10 mL Intravenous PRN Sangeetha Deleon MD   10 mL at 21 192    glucose (GLUTOSE) 40 % oral gel 15 g  15 g Oral PRN Joey Archer MD  dextrose 50 % IV solution  12.5 g Intravenous PRN Augustina Lesch, MD   12.5 g at 02/10/21 1834    glucagon (rDNA) injection 1 mg  1 mg Intramuscular PRN Augustina Lesch, MD        dextrose 5 % solution  100 mL/hr Intravenous PRN Augustina Lesch, MD       [facility] [Held by provider] insulin glargine (LANTUS) injection vial 10 Units  10 Units Subcutaneous Nightly Augustina Lesch, MD   Stopped at 02/09/21 2132    insulin lispro (HUMALOG) injection vial 0-6 Units  0-6 Units Subcutaneous Q6H Augustina Lesch, MD   1 Units at 02/16/21 1238    potassium chloride 10 mEq/100 mL IVPB (Peripheral Line)  10 mEq Intravenous PRN Augustina Lesch,  mL/hr at 02/08/21 1208 10 mEq at 02/08/21 1418    magnesium sulfate 1000 mg in dextrose 5% 100 mL IVPB  1,000 mg Intravenous PRN Augustina Lesch, MD        sodium phosphate 10.89 mmol in dextrose 5 % 250 mL IVPB  0.16 mmol/kg Intravenous PRN Augustina Lesch, MD        Or   [facility] sodium phosphate 21.75 mmol in dextrose 5 % 250 mL IVPB  0.32 mmol/kg Intravenous PRN Augustina Lesch, MD        benzocaine (HURRICAINE) 20 % oral spray   Mouth/Throat 4x Daily PRN Augustina Lesch, MD   Given at 02/07/21 1205    pantoprazole (PROTONIX) tablet 40 mg  40 mg Oral QAM AC Augustina Lesch, MD   40 mg at 02/16/21 0622    [Held by provider] spironolactone (ALDACTONE) tablet 50 mg  50 mg Oral Daily Augustina Lesch, MD        [Held by provider] furosemide (LASIX) tablet 20 mg  20 mg Oral Daily Augustina Lesch, MD        sodium chloride flush 0.9 % injection 10 mL  10 mL Intravenous 2 times per day Augustina Lesch, MD   10 mL at 02/14/21 0805    sodium chloride flush 0.9 % injection 10 mL  10 mL Intravenous PRN Augustina Lesch, MD        enoxaparin (LOVENOX) injection 40 mg  40 mg Subcutaneous Daily Augustina Lesch, MD   40 mg at 02/16/21 6231  promethazine (PHENERGAN) tablet 12.5 mg  12.5 mg Oral Q6H PRN Rose Marie Serrano MD        Or    ondansetron [facility]) injection 4 mg  4 mg Intravenous Q6H PRN Rose Marie Serrano MD   4 mg at 02/16/21 0980    acetaminophen (TYLENOL) tablet 650 mg  650 mg Oral Q6H PRN Rose Marie Serrano MD   650 mg at 02/06/21 0940    Or    acetaminophen (TYLENOL) suppository 650 mg  650 mg Rectal Q6H PRN Rose Marie Serrano MD        senna (SENOKOT) tablet 17.2 mg  2 tablet Oral Nightly Rose Marie Serraon MD   17.2 mg at 02/14/21 2013       Additional work up or/and treatment plan may be added today or then after based on clinical progression. I am managing a portion of pt care. Some medical issues are handled by other specialists. Additional work up and treatment should be done in out pt setting by pt PCP and other out pt providers. In addition to examining and evaluating pt, I spent additional time explaining care, normaland abnormal findings, and treatment plan. All of pt questions were answered. Counseling, diet and education were provided. Case will be discussed with nursing staff when appropriate. Family will be updated if and when appropriate.        Electronically signed by Jennie Gomez DO on 2/16/2021 at 3:33 PM

## 2021-02-16 NOTE — PROGRESS NOTES
Comprehensive Nutrition Assessment    Type and Reason for Visit:  Reassess    Nutrition Recommendations/Plan:   Continue TF. Standard with Fiber ( jevity 1.2) . Goal rate 60 ml/hr x 24 hrs  as tolerated via PEG. Water flush 100 ml, 4 x daily while on IVF  If able tolerate goal rate of 60, can trial bolus schedule 360 ml, 4 x daily  Or change to nocturnal infusion  Continue to follow for decisions regarding goals of care    Nutrition Assessment:  Severe malnutrition contiues, TF not yet at goal, tolerating sips of clearm had Hospice meeting yesterday, will monitor for goals for care    Malnutrition Assessment:  Malnutrition Status:  Severe malnutrition    Context:  Acute Illness     Findings of the 6 clinical characteristics of malnutrition:  Energy Intake:  7 - 50% or less of estimated energy requirements for 5 or more days  Weight Loss:  7 - Greater than 5% over 1 month     Body Fat Loss:  1 - Mild body fat loss Buccal region, Orbital   Muscle Mass Loss:  1 - Mild muscle mass loss Temples (temporalis)  Fluid Accumulation:  Unable to assess     Strength:  Not Performed    Estimated Daily Nutrient Needs:  Energy (kcal):  8997-0769 (kg x 22-25); Weight Used for Energy Requirements:  Current(69.8 kg)     Protein (g):  73-79 gm (kg IBW x 1.3-1.4);  Weight Used for Protein Requirements:  Ideal(56.8 kg)        Fluid (ml/day):  ~1700 ml; Method Used for Fluid Requirements:  1 ml/kcal Nutrition Related Findings: \"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 She continues to decline despte G-tube and diverting ostomy. I think her prognosis is very poor and not likely to respond to chemotherapy. Life expectancy a couple weeks with hospice and weeks to months with rehab and chemotherapy. \" PEG place 2/8, slow progression of TF to goal, has had c/o nausea, having liquid stools via ileostomy, I/O = 3184 ( 500 po)/500, Labs noted, meds reviewed, 1-2+ generalized edema present, masking weight loss,      Wounds:  Surgical Incision       Current Nutrition Therapies:    Current Tube Feeding (TF) Orders:  · Feeding Route: PEG  · Formula: Standard w/Fiber  · Schedule: Continuous @ 50 ml/hr ( goal rate 60 )  · Water Flushes: 140 ml every 4 hrs  · Current TF & Flush Orders Provides: @ 50 ml/hr - 1440 kcals, 67 g protein, ~1370 ml free water  · Goal TF & Flush Orders Provides: 1728 kcal, 80 gm protein, ~1720 ml free water      Anthropometric Measures:  · Height: 5' 5\" (165.1 cm)  · Current Body Weight: 189 lb (85.7 kg)((2/16) ? accuracy, 1-2+ generalized edema noted)   · Admission Body Weight: 160 lb (72.6 kg)(stated)    · Usual Body Weight: 165 lb (74.8 kg)(1/7/21)     · Ideal Body Weight: 125 lbs;     · BMI: 31.5  · BMI Categories: Normal Weight (BMI 18.5-24. 9)       Nutrition Diagnosis:   · In context of acute illness or injury, Severe malnutrition related to inadequate protein-energy intake as evidenced by poor intake prior to admission, mild muscle loss, mild loss of subcutaneous fat, weight loss      Nutrition Interventions:   Food and/or Nutrient Delivery:  Continue Current Tube Feeding(Continue TF. Standard with Fiber ( jevity 1.2) . Goal rate 60 ml/hr as tolerated via PEG. Water flush 100 ml, 4 x daily while on IVF. If able tolerate goal rate of 60, can trial bolus schedule 360 ml, 4 x daily.  Continue to follow for decisions rega) Nutrition Education/Counseling:  Education not indicated   Coordination of Nutrition Care:  No recommendation at this time    Goals:  New goal with initiation and tolerance to TF: EN to meet range of estimated energy/protein needs       Nutrition Monitoring and Evaluation:     Food/Nutrient Intake Outcomes:  Enteral Nutrition Intake/Tolerance  Physical Signs/Symptoms Outcomes:  Biochemical Data, GI Status, Weight, Nutrition Focused Physical Findings     Discharge Planning:    Enteral Nutrition     Electronically signed by Li Cole RD, DOYLE on 2/16/21 at 11:04 AM EST

## 2021-02-16 NOTE — ONCOLOGY
Hematology/Oncology  Attending Progress Note        CHIEF COMPLAINT/HPI:  Patient remains very weak. Tolerates some tube feeds. Some confusion. REVIEW OF SYSTEMS:    Unremarkable except for symptoms mentioned in HPI.     Current Inpatient Medications:    Current Facility-Administered Medications: metoclopramide (REGLAN) injection 5 mg, 5 mg, Intravenous, Q6H PRN  LORazepam (ATIVAN) injection 0.5 mg, 0.5 mg, Intravenous, Q6H PRN  0.9 % sodium chloride infusion, , Intravenous, Continuous  HYDROmorphone (DILAUDID) injection 0.5 mg, 0.5 mg, Intravenous, Q4H PRN  sodium chloride flush 0.9 % injection 10 mL, 10 mL, Intravenous, 2 times per day  sodium chloride flush 0.9 % injection 10 mL, 10 mL, Intravenous, PRN  glucose (GLUTOSE) 40 % oral gel 15 g, 15 g, Oral, PRN  dextrose 50 % IV solution, 12.5 g, Intravenous, PRN  glucagon (rDNA) injection 1 mg, 1 mg, Intramuscular, PRN  dextrose 5 % solution, 100 mL/hr, Intravenous, PRN  [Held by provider] insulin glargine (LANTUS) injection vial 10 Units, 10 Units, Subcutaneous, Nightly  insulin lispro (HUMALOG) injection vial 0-6 Units, 0-6 Units, Subcutaneous, Q6H  potassium chloride 10 mEq/100 mL IVPB (Peripheral Line), 10 mEq, Intravenous, PRN  magnesium sulfate 1000 mg in dextrose 5% 100 mL IVPB, 1,000 mg, Intravenous, PRN  sodium phosphate 10.89 mmol in dextrose 5 % 250 mL IVPB, 0.16 mmol/kg, Intravenous, PRN **OR** sodium phosphate 21.75 mmol in dextrose 5 % 250 mL IVPB, 0.32 mmol/kg, Intravenous, PRN  benzocaine (HURRICAINE) 20 % oral spray, , Mouth/Throat, 4x Daily PRN  pantoprazole (PROTONIX) tablet 40 mg, 40 mg, Oral, QAM AC  [Held by provider] spironolactone (ALDACTONE) tablet 50 mg, 50 mg, Oral, Daily  [Held by provider] furosemide (LASIX) tablet 20 mg, 20 mg, Oral, Daily  sodium chloride flush 0.9 % injection 10 mL, 10 mL, Intravenous, 2 times per day  sodium chloride flush 0.9 % injection 10 mL, 10 mL, Intravenous, PRN enoxaparin (LOVENOX) injection 40 mg, 40 mg, Subcutaneous, Daily  promethazine (PHENERGAN) tablet 12.5 mg, 12.5 mg, Oral, Q6H PRN **OR** ondansetron (ZOFRAN) injection 4 mg, 4 mg, Intravenous, Q6H PRN  acetaminophen (TYLENOL) tablet 650 mg, 650 mg, Oral, Q6H PRN **OR** acetaminophen (TYLENOL) suppository 650 mg, 650 mg, Rectal, Q6H PRN  senna (SENOKOT) tablet 17.2 mg, 2 tablet, Oral, Nightly    PHYSICAL EXAM:    VITALS:  BP (!) 132/59   Pulse 89   Temp 97.8 °F (36.6 °C) (Oral)   Resp 18   Ht 5' 5\" (1.651 m)   Wt 189 lb 4.8 oz (85.9 kg)   SpO2 98%   BMI 31.50 kg/m²   INTAKE/OUTPUT:      Intake/Output Summary (Last 24 hours) at 2/16/2021 1020  Last data filed at 2/16/2021 0500  Gross per 24 hour   Intake 2944 ml   Output 610 ml   Net 2334 ml     CONSTITUTIONAL:  awake, no apparent distress, and appears stated age  ENT:  Normocephalic. NECK:  Supple, symmetrical, trachea midline, no adenopathy, thyroid symmetric, not enlarged and no tenderness, skin normal  LUNGS:  No increased work of breathing, good air exchange, clear to auscultation bilaterally, no crackles or wheezing  CARDIOVASCULAR:  Normal apical impulse, regular rate and rhythm, normal S1 and S2, no S3 or S4, and no murmur noted  ABDOMEN:  No scars, decreased bowel sounds. MUSCULOSKELETAL:  There is no redness, warmth, or swelling of the joints. Full range of motion noted. Motor strength is 5 out of 5 all extremities bilaterally. Tone is normal.  NEUROLOGIC:  Awake, lethargic, mild confusion. Sensory is intact.     SKIN:  no bruising or bleeding and no rashes    DATA:      PT/INR:  No results found for: PTINR  PTT:  No results found for: APTT  CMP:    Lab Results   Component Value Date     02/16/2021    K 4.3 02/16/2021     02/16/2021    CO2 19 02/16/2021    BUN 17 02/16/2021    PROT 5.9 02/02/2021     Magnesium:    Lab Results   Component Value Date    MG 1.9 02/12/2021     Phosphorus:  No components found for: PO4 Calcium:  No components found for: CA  CBC:    Lab Results   Component Value Date    WBC 21.5 02/16/2021    RBC 3.59 02/16/2021    HGB 10.2 02/16/2021    HCT 32.4 02/16/2021    MCV 90.3 02/16/2021    RDW 14.9 02/16/2021     02/16/2021     DIFF:    Lab Results   Component Value Date    MCV 90.3 02/16/2021    RDW 14.9 02/16/2021      LDH:  No results found for: LDH  Uric Acid:  No components found for: URIC    CBC with Differential:    Lab Results   Component Value Date    WBC 21.5 02/16/2021    RBC 3.59 02/16/2021    HGB 10.2 02/16/2021    HCT 32.4 02/16/2021     02/16/2021    MCV 90.3 02/16/2021    MCH 28.4 02/16/2021    MCHC 31.5 02/16/2021    RDW 14.9 02/16/2021    BANDSPCT 2 02/13/2021    METASPCT 3 02/13/2021    LYMPHOPCT 8.3 02/15/2021    MONOPCT 3.6 02/15/2021    BASOPCT 0.3 02/15/2021    MONOSABS 0.6 02/15/2021    LYMPHSABS 1.4 02/15/2021    EOSABS 0.2 02/15/2021    BASOSABS 0.0 02/15/2021     CMP:    Lab Results   Component Value Date     02/16/2021    K 4.3 02/16/2021     02/16/2021    CO2 19 02/16/2021    BUN 17 02/16/2021    CREATININE 0.62 02/16/2021    GFRAA >60.0 02/16/2021    LABGLOM >60.0 02/16/2021    GLUCOSE 178 02/16/2021    PROT 5.9 02/02/2021    LABALBU 3.1 02/02/2021    CALCIUM 7.8 02/16/2021    BILITOT 0.6 02/02/2021    ALKPHOS 139 02/02/2021    AST 32 02/02/2021    ALT 38 02/02/2021       RADIOLOGY RESULTS:  Echocardiogram Complete 2d With Doppler With Color    Result Date: 2/3/2021 Transthoracic Echocardiography Report (TTE)  Demographics  Patient Name    Guadalupe Brown Gender               Female  Patient Number  [id number]       Race                                                  Ethnicity  Visit Number    [id number]      Room Number          D748  Corporate ID                   Date of Study        02/03/2021  Accession       [id number]     Referring Physician  Number  Date of Birth   1950     Sonographer          [address]  Age             69 year(s)     Interpreting         [facility])                                 Physician            Cardiology                                                      Chelsy Ho MD Procedure Type of Study  TTE procedure:ECHO COMPLETE 2D W/DOP W/COLOR. Procedure Date Date: 02/03/2021 Start: 08:33 AM Study Location: Portable Technical Quality: Poor visualization due to poor acoustical window. Indications:LVF. Patient Status: Routine Height: 65 inches Weight: 153 pounds BSA: 1.77 m^2 BMI: 25.46 kg/m^2  Conclusions  Summary  Very limited and technically difficult study. LV Function appears to be preserved. will need repeat at later time. when more stable  Signature  ----------------------------------------------------------------  Electronically signed by Chelsy Ho MD(Interpreting  physician) on 02/03/2021 12:07 PM  ----------------------------------------------------------------    Xr Abdomen (kub) (single Ap View)    Result Date: 2/4/2021  EXAMINATION: KUB CLINICAL HISTORY: No bowel movement for 2 weeks. COMPARISON :None FINDINGS: 3 view of the abdomen  submitted. There are multiply dilated and distended loops of large bowel. The transition point may be within the region of the distal descending colon and sigmoid. Bowel gas is seen within small bowel. 3 view of the abdomen shows bowel gas in both large and small bowel. DISTENDED DILATED LOOPS OF LARGE BOWEL WITH A TRANSITION POINT IN THE REGION OF THE DESCENDING COLON, SIGMOID. FINDINGS COULD SUGGEST THAT OF POSSIBLE HIGH-GRADE OBSTRUCTION. FURTHER EVALUATION IS WARRANTED. Us Pelvis Complete    Result Date: 2/11/2021  EXAMINATION:  US PELVIS COMPLETE CLINICAL HISTORY: Ascites. COMPARISONS:  NONE AVAILABLE TECHNIQUE:  Transabdominal ultrasound of the pelvis. FINDINGS:  The uterus is surgically absent. Both left and right ovaries are surgically absent. There is free fluid within the pelvis. FREE FLUID WITHIN THE PELVIS. EXAMINATION:  US DUP ABD PEL RETRO SCROT COMPLETE CLINICAL HISTORY: Abnormal CT scan. COMPARISONS:  NONE AVAILABLE TECHNIQUE:  Transabdominal ultrasound of the right upper quadrant with both duplex color ultrasound and spectral Doppler ultrasound with interrogation of the hepatic and portal venous system was performed. FINDINGS:  The visualized portion of the right lobe of the liver shows no focal parenchymal abnormalities. The hypoechoic area seen at the inferior medial aspect of the right lobe of liver on CT is not appreciated. Within the field-of-view there are findings of cholelithiasis. There is ascites present. The hepatic veins and portal veins are patent. Normal direction seen within the portal veins and hepatic vein. IMPRESSION:  1. ASCITES IS PRESENT WITHIN THE FIELD-OF-VIEW. 2. CHOLELITHIASIS. 3. UNREMARKABLE SONOGRAPHIC EXAMINATION OF THE HEPATIC AND PORTAL VEIN.     Ct Abdomen Pelvis W Iv Contrast Additional Contrast? None    Result Date: 2/1/2021 EXAM:  CT ABDOMEN PELVIS W IV CONTRAST History: Abdominal distention. Abdominal pain. Technique: Multiple contiguous axial images were obtained of the abdomen and pelvis from an level of the lung bases through the ischial tuberosities with IV contrast. Multiplanar reformats were obtained. Delayed images were obtained. Comparison: None available Findings: Small right pleural effusion. Bibasilar subsegmental atelectasis. Subtle nodularity of the liver. A 1 cm hypodense lesion within the right lobe of the liver is seen on axial series 2 image 29 and an ill-defined area of hypodensity within the inferior right lobe of the liver measuring approximately 2 cm is seen on axial  series 2 image 38. The gallbladder is physiologically distended and contains multiple small densities within the dependent portion. No gallbladder wall thickening is identified. The stomach, spleen, pancreas, and adrenal glands appear within normal limits. Small hiatal hernia. There is thickening and nodularity of the omentum. There is a large amount of ascites demonstrating simple fluid density. The kidneys enhance uniformly. There is mild right-sided hydronephrosis however no radiopaque or urinary tract calculi identified. No left-sided urinary tract calculi or hydronephrosis. Urinary bladder is suboptimally distended. There is mild perivesicular inflammation. The uterus is surgically absent Abdominal aorta is nonaneurysmal  . No retroperitoneal or abdominal/pelvic lymphadenopathy. No small bowel obstruction. No overt colonic mass or pericolonic inflammation although the large amount of ascites obscures evaluation for inflammation. There is a large amount of stool within the rectosigmoid colon. The appendix is not definitively visualized. No pneumoperitoneum or portal venous gas. No acute or aggressive osseous abnormality. Degenerative changes of the spine. Thickening and nodularity of the omentum concerning for peritoneal metastasis or peritonitis. A large amount of ascites is present. Two ill-defined lesions within the right lobe of the liver measuring 1 cm and 2 cm respectively are nonspecific but most concerning for metastatic lesions. Mild perivesicular inflammation. Correlation with urinalysis recommended to exclude cystitis. Large amount stool within the rectosigmoid colon may represent constipation and/or fecal impaction. Subtle nodularity of the liver suggests cirrhosis. Mild right-sided hydronephrosis without radiopaque calculus. Debris within the gallbladder likely represents gallstones and/or gallbladder sludge. Small right pleural effusion. All CT scans at this facility use dose modulation, iterative reconstruction, and/or weight based dosing when appropriate to reduce radiation dose to as low as reasonably achievable. Us Abdomen Limited    Result Date: 2/3/2021  CLINICAL INDICATION:   Patient with new ascites and abdominal pain, concern for metastatic disease COMPARISON: CT dating February 1, 2021 DISCUSSION:     Transverse and longitudinal images of the right upper quadrant of the abdomen were obtained with color Doppler interrogation of the hepatic vessels. The liver is normal  in echogenicity and no evidence of focal masses seen in the right liver lobe. The hepatic vein, and portal veins are patent. There is normal direction of blood flow in the portal veins towards the liver. The main portal vein, right portal vein, left portal vein, and hepatic vein are patent with normal direction. The common hepatic artery is patent with velocity measuring 72 cm/s. The gallbladder is seen with echogenic gallstones. The gallbladder wall measures 2.4 mm. 1.  Status post technically successful ultrasound-guided paracentesis. George Gibson is a Female of 69 years age, referred for Ultrasound Guided Paracentesis. PROCEDURE: Survey of the abdomen showed large amount of ascites fluid. After obtaining informed consent, the patient was positioned supine on the sonography table. Using ultrasound, the skin over the left hemiabdomen was locally anesthetized with 1% lidocaine. Following that, a Yueh needle was advanced into the fluid pocket using ultrasound visualization. 4440cc, of clear yellow fluid were aspirated and sent for cytology, and pathology. The needle was removed, and hemostasis was obtained with pressure. A Band-Aid was placed. Post procedure images did not demonstrate hemorrhage at the target site. The patient tolerated the procedure well. The patient left the department in good condition. A radiology nurse was in presence monitoring vital signs, assisting throughout the procedure.      Fl Small Bowel Follow Through Only    Result Date: 2/7/2021 Small bowel follow-through. HISTORY: Postcolonoscopy. COMPARISON: CT abdomen pelvis, February 1, 2021. FINDINGS:  image shows a nasogastric tube in stomach. Marked diffuse dilatation of colon cecum sigmoid colon. 0 minute film shows administration of oral contrast medium via nasogastric tube with contrast medium filling in stomach. 30 minute image shows contrast medium within attenuated distal stomach. Contrast medium visualized filling the duodenum with loop of small bowel coursing into right lower quadrant. Contrast medium also fills normal caliber jejunal loops and epigastric area residual contrast medium from attempted barium enema identified sigmoid colon. 60 minute image shows contrast medium pooling within jejunal loops in the right lower quadrant, and contrast medium to a lesser degree filling jejunal loops found in the left perigastric region. 90 minute image shows mild progression of contrast medium into proximal and mid jejunal loops. Several loops shows little progression of contrast medium passed level of mid jejunum. 4 hour image shows contrast medium with bowel loops in left lower quadrant. However, contrast media now visualized overlying region of the ileocecal valve and portion of ascending colon. 6 hour image shows additional contrast medium entering ascending colon and now entering proximal transverse colon, splenic flexure, and proximal descending colon. There is marked evaluate attenuation of contrast medium within the small bowel. At 8.5 hours, there is contrast medium continues to empty from the small bowel, and now is layering, in the dilated ascending colon. Dilatation of the cecum, measuring approximately 13.7 cm is identified. Residual contrast medium still fills the rectum. Final imaging of 21.5 hours shows near complete evacuation of contrast medium from the small bowel, January dilatation seen to the transverse diameter 17.2 cm, and contrast medium filling the ascending colon. There is still markedly pronounced more distal colon. Dilated small bowel loops, with persistent delay at level of proximal jejunum. Contrast medium visualized ileocecal valve at 4 hours. Following remains air-filled and dilated at that time. Findings suggest stenosis/partial obstruction at level of distal colon and at level of mid jejunum. Fl Barium Enema    Result Date: 2/8/2021  COMPARISON: February 4, 2021 Fluoroscopic time. 1.6 minutes 17 images were obtained. HISTORY:    Abdominal pain, possible obstruction PATIENT NAME: JOEY SALDANA: TECHNIQUE: FL BARIUM ENEMA FINDINGS: Air is seen within bowel. An NG tube is seen in place with tip in the area of the stomach. Dilated loops of bowel are again seen. The most dilated loop measures up to 11.7 cm. Barium was introduced into the rectum. When the barium went into the distal sigmoid colon, patient was  unable to tolerate the barium and asked for the procedure to be stop. The barium was drained from the colon. Incomplete barium enema. Patient unable to tolerate procedure. There is diffuse dilation of the bowel. 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 She continues to decline despte G-tube and diverting ostomy. I think her prognosis is very poor and not likely to respond to chemotherapy. Life expectancy a couple weeks with hospice and weeks to months with rehab and chemotherapy. I discussed my concerns with patient, , and daughter today.       Electronically signed by Leona Araiza DO on 2/16/21 at 10:20 AM EST

## 2021-02-16 NOTE — PROGRESS NOTES
Physical Therapy Missed Treatment   Facility/Department: [facility] MED SURG [id number]    NAME: Ayaan Ocampo    : 1950 (69 y.o.)  MRN: [id number]    Account: [de-identified]  Gender: female        [x] Patient Declines PT Treatment:Pt declined stating \" Im too wiped out\"            [] Patient Unavailable: Will attempt PT treatment again at earliest convenience.         Electronically signed by Zoey Stokes PTA on 21 at 1:34 PM EST

## 2021-02-16 NOTE — CARE COORDINATION
Spoke with Dr. Tomer Christie and reviewed Dr. Danielle Grand note which prognosis is poor. Hospice meeting was held,  did not sign on. Hopefully tomorrow will give answer on which way he wants to pursue.     . Electronically signed by Dylon Olmstead RN on 2/16/2021 at 12:48 PM

## 2021-02-16 NOTE — FLOWSHEET NOTE
Assessment completed this shift. Pt is alert and oriented x4. VSS. C/o severe pain and medicated with PRN dilaudid. Pt denies nausea. Tube feed increased to 40 and patient tolerates well. Continues to deny nausea. Aguilar intact draining clear yellow urine and ostomy draining watery brown stool. YULISA compressed with little drainage at this time. Pt turned and repositioned very frequently, as she is in a lot of pain when she remains in the same position. Specialty bed request submitted. Call light in reach, will continue to monitor. 2014 - Patient complaining of nausea. Given PRN reglan with relief. Tube feed remains at 50 mls/hr, residual 50 mls.

## 2021-02-16 NOTE — PLAN OF CARE
Nutrition Problem #1: In context of acute illness or injury, Severe malnutrition  Intervention: Food and/or Nutrient Delivery: Continue Current Tube Feeding(Continue TF. Standard with Fiber ( jevity 1.2) . Goal rate 60 ml/hr as tolerated via PEG. Water flush 100 ml, 4 x daily while on IVF. If able tolerate goal rate of 60, can trial bolus schedule 360 ml, 4 x daily.  Continue to follow for decisions rega)  Nutritional Goals: New goal with initiation and tolerance to TF: EN to meet range of estimated energy/protein needs

## 2021-02-16 NOTE — PROGRESS NOTES
Wound Ostomy Continence Nurse  Ostomy Referral Follow-up Progress Note      NAME:  Kita Robertson  MEDICAL RECORD NUMBER:  [id number]  AGE: 69 y.o. GENDER:  female  :  1950  TODAY'S DATE:  2021    Subjective:    Kita Robertson is a 69 y.o. female referred by:   [x] Physician  [] Nursing  [] Other:     End ileostomy and New    Objective    BP (!) 132/59   Pulse 89   Temp 97.8 °F (36.6 °C) (Oral)   Resp 18   Ht 5' 5\" (1.651 m)   Wt 189 lb 4.8 oz (85.9 kg)   SpO2 98%   BMI 31.50 kg/m²     Emmanuel Risk Score Emmanuel Scale Score: 18    Assessment   Surgeon - Dr. Cherylene Joe       Ileostomy Ileostomy RLQ (Active)   Stomal Appliance 1 piece; Changed 21 1140   Flange Size (inches) 1.25 Inches 21 1140   Stoma  Assessment Moist;Red;Protrudes 21 1140   Mucocutaneous Junction Intact 21 1140   Peristomal Assessment Clean; Intact 21 1140   Treatment Bag change;Site care 21 1140   Stool Color Brown 21 1140   Stool Appearance Loose 21 1140   Stool Amount Medium 21 1140   Output (mL) 150 ml 21 1140   Number of days: 7     No change in ileostomy appearance or assessment. Patient drowsy, slept through ileostomy appliance change. Patient's  in the room, does not have any questions at this time. More supplies will be left at the bedside this afternoon.        Intake/Output Summary (Last 24 hours) at 2021 1143  Last data filed at 2021 1140  Gross per 24 hour   Intake 2944 ml   Output 760 ml   Net 2184 ml       Plan:   Plan for Ostomy Care: See above    Ostomy Plan of Care  [] Supplies/Instructions left in room  [] Patient using home supplies  [x] Brand/supplies at bedside - [employer]    Current Diet: DIET CLEAR LIQUID;  Diet Tube Feed Continuous/Cyclic w/ Diet  Dietician consult:  Yes    Discharge Plan:  Placement for patient upon discharge: Undetermined     Outpatient visit plan No Supplies given Yes   Samples requested Yes    Referrals:  []   [] [facility]  [] Supplies  [] Other      Patient/Caregiver Teaching:  Written Instructions given to patient/family: N/A  Teaching provided:  [] Reviewed GI and A&P        [] Supplies  [] Pouch emptying      [] Manipulate closure  [] Routine Care         [] Comment  [] Pouch maintenance           Level of patient/caregiver understanding able to:  [] Indicates understanding       [] Needs reinforcement  [] Unsuccessful      [] Verbal Understanding  [] Demonstrated understanding       [] No evidence of learning  [] Refused teaching         [x] N/A    Electronically signed by Santino Macias, YURIYN, RN, CWOCN on 2/16/2021 at 12:05 PM

## 2021-02-16 NOTE — PROGRESS NOTES
Assessment completed this shift. Alert and oriented x4. Patient anxious stating \"I'm scared \". Repositioned for comfort. Medicated with Ativan, Zofran and Dilaudid as ordered.  at bedside.     Electronically signed by Mariam Raymond RN on 2/16/2021 at 8:20 AM

## 2021-02-17 VITALS
TEMPERATURE: 98.4 F | BODY MASS INDEX: 31.54 KG/M2 | OXYGEN SATURATION: 97 % | SYSTOLIC BLOOD PRESSURE: 126 MMHG | WEIGHT: 189.3 LBS | HEART RATE: 95 BPM | DIASTOLIC BLOOD PRESSURE: 64 MMHG | RESPIRATION RATE: 16 BRPM | HEIGHT: 65 IN

## 2021-02-17 LAB
GLUCOSE BLD-MCNC: 103 MG/DL (ref 60–115)
GLUCOSE BLD-MCNC: 116 MG/DL (ref 60–115)
GLUCOSE BLD-MCNC: 128 MG/DL (ref 60–115)
GLUCOSE BLD-MCNC: 164 MG/DL (ref 60–115)
PERFORMED ON: ABNORMAL
PERFORMED ON: NORMAL

## 2021-02-17 PROCEDURE — 6360000002 HC RX W HCPCS: Performed by: COLON & RECTAL SURGERY

## 2021-02-17 PROCEDURE — 99212 OFFICE O/P EST SF 10 MIN: CPT

## 2021-02-17 PROCEDURE — 6360000002 HC RX W HCPCS: Performed by: FAMILY MEDICINE

## 2021-02-17 PROCEDURE — 6360000002 HC RX W HCPCS: Performed by: INTERNAL MEDICINE

## 2021-02-17 PROCEDURE — 2580000003 HC RX 258: Performed by: INTERNAL MEDICINE

## 2021-02-17 PROCEDURE — 1210000000 HC MED SURG R&B

## 2021-02-17 PROCEDURE — 2580000003 HC RX 258: Performed by: COLON & RECTAL SURGERY

## 2021-02-17 RX ADMIN — LORAZEPAM 0.5 MG: 2 INJECTION INTRAMUSCULAR at 04:47

## 2021-02-17 RX ADMIN — ONDANSETRON 4 MG: 2 INJECTION INTRAMUSCULAR; INTRAVENOUS at 17:42

## 2021-02-17 RX ADMIN — LORAZEPAM 0.5 MG: 2 INJECTION INTRAMUSCULAR at 11:43

## 2021-02-17 RX ADMIN — Medication 10 ML: at 10:04

## 2021-02-17 RX ADMIN — TRIMETHOBENZAMIDE HYDROCHLORIDE 200 MG: 100 INJECTION INTRAMUSCULAR at 00:57

## 2021-02-17 RX ADMIN — ONDANSETRON 4 MG: 2 INJECTION INTRAMUSCULAR; INTRAVENOUS at 02:35

## 2021-02-17 RX ADMIN — METOCLOPRAMIDE HYDROCHLORIDE 5 MG: 5 INJECTION INTRAMUSCULAR; INTRAVENOUS at 04:47

## 2021-02-17 RX ADMIN — HYDROMORPHONE HYDROCHLORIDE 0.5 MG: 1 INJECTION, SOLUTION INTRAMUSCULAR; INTRAVENOUS; SUBCUTANEOUS at 07:51

## 2021-02-17 RX ADMIN — METOCLOPRAMIDE HYDROCHLORIDE 5 MG: 5 INJECTION INTRAMUSCULAR; INTRAVENOUS at 21:28

## 2021-02-17 RX ADMIN — HYDROMORPHONE HYDROCHLORIDE 0.5 MG: 1 INJECTION, SOLUTION INTRAMUSCULAR; INTRAVENOUS; SUBCUTANEOUS at 17:42

## 2021-02-17 RX ADMIN — LORAZEPAM 0.5 MG: 2 INJECTION INTRAMUSCULAR at 22:38

## 2021-02-17 RX ADMIN — ONDANSETRON 4 MG: 2 INJECTION INTRAMUSCULAR; INTRAVENOUS at 10:04

## 2021-02-17 RX ADMIN — TRIMETHOBENZAMIDE HYDROCHLORIDE 200 MG: 100 INJECTION INTRAMUSCULAR at 16:37

## 2021-02-17 RX ADMIN — METOCLOPRAMIDE HYDROCHLORIDE 5 MG: 5 INJECTION INTRAMUSCULAR; INTRAVENOUS at 15:29

## 2021-02-17 RX ADMIN — ENOXAPARIN SODIUM 40 MG: 40 INJECTION SUBCUTANEOUS at 10:04

## 2021-02-17 NOTE — PROGRESS NOTES
Pt continues to dry heave despite medication and lower tube feed rate  Dr Barbara Hi notified  Turning off tube feed

## 2021-02-17 NOTE — PROGRESS NOTES
Spoke with Joaquim Last and choice of referral times offered. Joaquim Last chose [phone] tomorrow at 1814 Thi Perales is home with hospice per Joaquim Last and he would like to prepare home for pt to return home. Jayashree Wang, RN updated.

## 2021-02-17 NOTE — PROGRESS NOTES
Physician Progress Note    2/17/2021   3:08 PM    Name:  Roel Quezada  MRN:    [id number]     IP Day: 12     Admit Date: 2/1/2021  1:30 PM  PCP: Jael Tavares MD    Code Status:  Full Code      Assessment and Plan: Active Problems/ diagnosis:     Malignant ascites with adenocarcinoma-sources cholangiocarcinoma versus pancreatic cancer with liver mets and omental mets  Debility  High-grade colon obstruction status post ex lap with subtotal colectomy and ileostomy by Dr. Zenaida Hannon  Dysphagia on tube feeding  BALA  Hyponatremia  Advance care planning    Plan  2/15/2021  -I discussed goals of care with patient's daughter today. There is a hospice meeting with patient's  at 4 PM today.  -I discussed patient with Dr. Zenaida Hannon, believe the patient's prognosis is poor based on her overall clinical status, malnutrition and deconditioning and this is metastatic disease.  -I believe patient is hospice appropriate  -Functional status is very poor unfortunately  -Discontinue antibiotics as no evidence of infection  -Resume insulin  -Resume PPIs  -Resume IV hydration until tube feeds are at goal  -Increase tube feeds 10cc/hr in 2-hour increments as tolerated to goal of 60 cc/hour    2/16/2021  -I discussed goals of care with patient's daughter, son and  at bedside this morning. They understand that the prognosis is very poor. I also discussed the case with oncologist on-call Dr. Lalitha Clayton who agrees that the prognosis is poor.  - would like to discuss further with patient and family members prior to making decision regarding hospice.  -Tube feed is at 50/hour  -Discontinue IV hydration  -We will readdress goals of care discussion with family tomorrow.     2/16/2021  -Patient tube feed was stopped last night due to nausea, will start at 20/hour -I called patient  Mr. Whiteside Overall over the phone today. He will be here at 4 PM.  Will discuss goals of care. We will need to determine the next level of care whether the decision is hospice or skilled facility.  -I discussed the patient with oncologist again today. Prognosis is very poor.  -Resume current medications    DVT PPx     Subjective:     Patient is comfortable today. She is very weak but more alert. Physical Examination:      Vitals:  /68   Pulse 88   Temp 97.9 °F (36.6 °C)   Resp 20   Ht 5' 5\" (1.651 m)   Wt 189 lb 4.8 oz (85.9 kg)   SpO2 99%   BMI 31.50 kg/m²   Temp (24hrs), Av.6 °F (36.4 °C), Min:97.2 °F (36.2 °C), Max:97.9 °F (36.6 °C)      General appearance: Very weak and sleepy. Answer questions. Mental Status: oriented to person, place and time  Lungs: clear to auscultation bilaterally, normal effort  Heart: regular rate and rhythm, no murmur  Abdomen: soft, nondistended, bowel sounds present, no masses. ileostomy   Extremities: 3+ pitting bilateral lower extremity edema   skin: no gross lesions, rashes    Data:     Labs:  Recent Labs     02/15/21  0624 21  0618   WBC 16.9* 21.5*   HGB 9.8* 10.2*    351     Recent Labs     02/15/21  0624 21  0618    139   K 4.0 4.3   * 110*   CO2 21 19*   BUN 16 17   CREATININE 0.64 0.62   GLUCOSE 126* 178*     No results for input(s): AST, ALT, ALB, BILITOT, ALKPHOS in the last 72 hours.     Current Facility-Administered Medications   Medication Dose Route Frequency Provider Last Rate Last Admin    trimethobenzamide (TIGAN) injection 200 mg  200 mg Intramuscular Q6H PRN Chris Damianden Sedar, DO   200 mg at 21 0057    metoclopramide (REGLAN) injection 5 mg  5 mg Intravenous Q6H PRN Ashlyn Allen MD   5 mg at 21 0447    LORazepam (ATIVAN) injection 0.5 mg  0.5 mg Intravenous Q6H PRN Celia Hassan MD   0.5 mg at 21 1140  HYDROmorphone (DILAUDID) injection 0.5 mg  0.5 mg Intravenous Q4H PRN Mindi Pena MD   0.5 mg at 02/17/21 0751    sodium chloride flush 0.9 % injection 10 mL  10 mL Intravenous 2 times per day Kenroy Gaona MD   10 mL at 02/17/21 1004    sodium chloride flush 0.9 % injection 10 mL  10 mL Intravenous PRN Kenroy Gaona MD   10 mL at 02/09/21 1929    glucose (GLUTOSE) 40 % oral gel 15 g  15 g Oral PRN Jeff Rosario MD        dextrose 50 % IV solution  12.5 g Intravenous PRN Jeff Rosario MD   12.5 g at 02/10/21 1834    glucagon (rDNA) injection 1 mg  1 mg Intramuscular PRN Jeff Rosario MD        dextrose 5 % solution  100 mL/hr Intravenous PRN Jeff Rosario MD        [Held by provider] insulin glargine (LANTUS) injection vial 10 Units  10 Units Subcutaneous Nightly Jeff Rosario MD   Stopped at 02/09/21 2132    insulin lispro (HUMALOG) injection vial 0-6 Units  0-6 Units Subcutaneous Q6H Jeff Rosario MD   1 Units at 02/16/21 1238    potassium chloride 10 mEq/100 mL IVPB (Peripheral Line)  10 mEq Intravenous PRN Jeff Rosario  mL/hr at 02/08/21 1208 10 mEq at 02/08/21 1418    magnesium sulfate 1000 mg in dextrose 5% 100 mL IVPB  1,000 mg Intravenous PRN Jeff Rosario MD        sodium phosphate 10.89 mmol in dextrose 5 % 250 mL IVPB  0.16 mmol/kg Intravenous PRN Jeff Rosario MD        Or   [facility] sodium phosphate 21.75 mmol in dextrose 5 % 250 mL IVPB  0.32 mmol/kg Intravenous PRN Jeff Rosario MD        benzocaine (HURRICAINE) 20 % oral spray   Mouth/Throat 4x Daily PRN Jeff Rosario MD   Given at 02/07/21 1205    pantoprazole (PROTONIX) tablet 40 mg  40 mg Oral QAM AC Jeff Rosario MD   Stopped at 02/17/21 0700    [Held by provider] spironolactone (ALDACTONE) tablet 50 mg  50 mg Oral Daily Jeff Rosario MD  [Held by provider] furosemide (LASIX) tablet 20 mg  20 mg Oral Daily Osbaldo Nicholas MD        sodium chloride flush 0.9 % injection 10 mL  10 mL Intravenous 2 times per day Osbaldo Nicholas MD   10 mL at 02/17/21 1004    sodium chloride flush 0.9 % injection 10 mL  10 mL Intravenous PRN Osbaldo Nicholas MD        enoxaparin (LOVENOX) injection 40 mg  40 mg Subcutaneous Daily Osbaldo Nicholas MD   40 mg at 02/17/21 1004    promethazine (PHENERGAN) tablet 12.5 mg  12.5 mg Oral Q6H PRN Osbaldo Nicholas MD        Or    ondansetron [facility]) injection 4 mg  4 mg Intravenous Q6H PRN Osbaldo Nicholas MD   4 mg at 02/17/21 1004    acetaminophen (TYLENOL) tablet 650 mg  650 mg Oral Q6H PRN Osbaldo Nicholas MD   650 mg at 02/06/21 0940    Or    acetaminophen (TYLENOL) suppository 650 mg  650 mg Rectal Q6H PRN Osbaldo Nicholas MD        senna (SENOKOT) tablet 17.2 mg  2 tablet Oral Nightly Osbaldo Nicholas MD   17.2 mg at 02/14/21 2013       Additional work up or/and treatment plan may be added today or then after based on clinical progression. I am managing a portion of pt care. Some medical issues are handled by other specialists. Additional work up and treatment should be done in out pt setting by pt PCP and other out pt providers. In addition to examining and evaluating pt, I spent additional time explaining care, normaland abnormal findings, and treatment plan. All of pt questions were answered. Counseling, diet and education were provided. Case will be discussed with nursing staff when appropriate. Family will be updated if and when appropriate.        Electronically signed by Ira Arroyo DO on 2/17/2021 at 3:08 PM

## 2021-02-17 NOTE — PROGRESS NOTES
Pt's vitals are stable. She has complained of 4/10 pain throughout the day- dilaudid given once. RN on nights said she became very nauseous and wasn't tolerating the tube feed. I gave her Zofran and restarted her tube feeding at 10 ml/hr- so far she is tolerating this well without nausea/vomiting. Will increase every few hours till goal rate. Dr. Fide Polanco notified & aware. Pt's son was at bedside this morning, her spouse will be here at 1600 to decide on hospice plans. Ostomy is working well, loose stool about 100 ml so far today. Continuing to do q4 hour flushes with peg. Continuing to turn her every two hours- she is also on a dolphin mattress. She is tolerating sips of water. Call light in reach, bed alarm on.     1627- Pt has now been complaining of nausea for the past hour, dry heaving. I stopped the tube feed again, Reglan did not help, will try Tigan next. Dressing change down, wound francis changed. Spouse is at bedside, I let Dr. Fide Polanco know.

## 2021-02-17 NOTE — PROGRESS NOTES
Wound Ostomy Continence Nurse  Ostomy Referral Follow-up Progress Note      NAME:  Chase Toth  MEDICAL RECORD NUMBER:  [id number]  AGE: 69 y.o. GENDER:  female  :  1950  TODAY'S DATE:  2021    Subjective:    Chase Toth is a 69 y.o. female referred by:   [x] Physician  [] Nursing  [] Other:     ileostomy and New    Objective    /68   Pulse 88   Temp 97.9 °F (36.6 °C)   Resp 20   Ht 5' 5\" (1.651 m)   Wt 189 lb 4.8 oz (85.9 kg)   SpO2 99%   BMI 31.50 kg/m²     Emmanuel Risk Score Emmanuel Scale Score: 18    Assessment   Surgeon - Dr. Sarah De La Rosa       Ileostomy Ileostomy RLQ (Active)   Stomal Appliance 1 piece;Clean;Dry; Intact 21 1215   Flange Size (inches) 1.25 Inches 21 1215   Stoma  Assessment Moist;Red;Protrudes 21 1215   Mucocutaneous Junction Intact 21 1215   Peristomal Assessment Clean; Intact 21 1215   Treatment Bag change;Site care 21 1140   Stool Color Brown 21 1215   Stool Appearance Soft 21 1215   Stool Amount Medium 21 1215   Output (mL) 100 ml 21 2347   Number of days: 8     No change in stoma appearance and ileostomy continues to function well. Appliance is clean, dry and intact.  Discharge plan is pending    Intake/Output Summary (Last 24 hours) at 2021 1255  Last data filed at 2021 0951  Gross per 24 hour   Intake 2408 ml   Output 750 ml   Net 1658 ml       Plan:   Plan for Ostomy Care: Discharge plan pending      Ostomy Plan of Care  [x] Supplies/Instructions left in room  [] Patient using home supplies  [x] Brand/supplies at bedside - [employer]    Current Diet: DIET CLEAR LIQUID;  Diet Tube Feed Continuous/Cyclic w/ Diet  Dietician consult:  Yes    Discharge Plan:  Placement for patient upon discharge: undetermined    Outpatient visit plan No  Supplies given Yes   Samples requested Yes    Referrals:  []   []  [facility] [] Supplies  [] Other      Patient/Caregiver Teaching:  Written Instructions given to patient/family: N/A  Teaching provided:  [] Reviewed GI and A&P        [] Supplies  [] Pouch emptying      [] Manipulate closure  [] Routine Care         [] Comment  [] Pouch maintenance           Level of patient/caregiver understanding able to:  [] Indicates understanding       [] Needs reinforcement  [] Unsuccessful      [] Verbal Understanding  [] Demonstrated understanding       [] No evidence of learning  [] Refused teaching         [x] N/A    Electronically signed by ANDREW Peralta, RN, Elizabeth Ballesteros on 2/17/2021 at 12:57 PM

## 2021-02-17 NOTE — PROGRESS NOTES
Physical Therapy Missed Treatment   Facility/Department: [facility] MED SURG B638/D951-34    NAME: Davis Mclean    : 1950 (69 y.o.)  MRN: [id number]    Account: [de-identified]  Gender: female    Chart reviewed, attempted PT at 1311. Patient unavailable 2° to:    [] Hold per ns request    [x] Pt declined pt reports she had a bad night and is not doing well today, pt declines therapy at this time. [] Pt. . off floor for test/procedure. [] Pt. Unavailable       Will attempt PT treatment again at earliest convenience.       Electronically signed by Tushar Rapp, PTA on 21 at 1:21 PM EST

## 2021-02-17 NOTE — CARE COORDINATION
Spoke with Dr. Deborah Nelson who spoke with son who is visiting  Pt's  is home sleeping and will return 0. Per Dr. Deborah Nelson he wants to speak with  and call him and he will come to unit, if for some reason spouse comes later still message Dr. Deborah Nelson and he will call spouse. Erin DYER made aware.   Electronically signed by Chio Lora RN on 2/17/2021 at 12:33 PM

## 2021-02-17 NOTE — CARE COORDINATION
HOSPICE ORDER RCVD, [facility] NURSE ON CALL FOR FAMILY MTG & SET UP FOR HOME WITH HOSPICE. ANSWERING SERVICE TO PAGE AND WILL CALL NURSE CARING FOR PT, SAUNDRA RN, SHE WILL UPDATE.  Electronically signed by Liliya Vernon RN on 2/17/2021 at 4:50 PM

## 2021-02-17 NOTE — PROGRESS NOTES
[facility] Occupational Therapy      Date: 2021  Patient Name: Lluvia Wisdom        MRN: [id number]  Account: [de-identified]   : 1950  (69 y.o.)  [facility]    Chart reviewed, attempted OT at 11:00 for OT tx. Patient not seen 2° to: Other: Pt. unavailable due to a physician in the room talking with patient and family. Will attempt again when able.     Electronically signed by KALEE Walden on 2021 at 11:00 AM

## 2021-02-17 NOTE — ONCOLOGY
Hematology/Oncology  Attending Progress Note        CHIEF COMPLAINT/HPI:  Tolerates sips of water. REVIEW OF SYSTEMS:    Unremarkable except for symptoms mentioned in HPI.     Current Inpatient Medications:    Current Facility-Administered Medications: trimethobenzamide (TIGAN) injection 200 mg, 200 mg, Intramuscular, Q6H PRN  metoclopramide (REGLAN) injection 5 mg, 5 mg, Intravenous, Q6H PRN  LORazepam (ATIVAN) injection 0.5 mg, 0.5 mg, Intravenous, Q6H PRN  HYDROmorphone (DILAUDID) injection 0.5 mg, 0.5 mg, Intravenous, Q4H PRN  sodium chloride flush 0.9 % injection 10 mL, 10 mL, Intravenous, 2 times per day  sodium chloride flush 0.9 % injection 10 mL, 10 mL, Intravenous, PRN  glucose (GLUTOSE) 40 % oral gel 15 g, 15 g, Oral, PRN  dextrose 50 % IV solution, 12.5 g, Intravenous, PRN  glucagon (rDNA) injection 1 mg, 1 mg, Intramuscular, PRN  dextrose 5 % solution, 100 mL/hr, Intravenous, PRN  [Held by provider] insulin glargine (LANTUS) injection vial 10 Units, 10 Units, Subcutaneous, Nightly  insulin lispro (HUMALOG) injection vial 0-6 Units, 0-6 Units, Subcutaneous, Q6H  potassium chloride 10 mEq/100 mL IVPB (Peripheral Line), 10 mEq, Intravenous, PRN  magnesium sulfate 1000 mg in dextrose 5% 100 mL IVPB, 1,000 mg, Intravenous, PRN  sodium phosphate 10.89 mmol in dextrose 5 % 250 mL IVPB, 0.16 mmol/kg, Intravenous, PRN **OR** sodium phosphate 21.75 mmol in dextrose 5 % 250 mL IVPB, 0.32 mmol/kg, Intravenous, PRN  benzocaine (HURRICAINE) 20 % oral spray, , Mouth/Throat, 4x Daily PRN  pantoprazole (PROTONIX) tablet 40 mg, 40 mg, Oral, QAM AC  [Held by provider] spironolactone (ALDACTONE) tablet 50 mg, 50 mg, Oral, Daily  [Held by provider] furosemide (LASIX) tablet 20 mg, 20 mg, Oral, Daily  sodium chloride flush 0.9 % injection 10 mL, 10 mL, Intravenous, 2 times per day  sodium chloride flush 0.9 % injection 10 mL, 10 mL, Intravenous, PRN  enoxaparin (LOVENOX) injection 40 mg, 40 mg, Subcutaneous, Daily promethazine (PHENERGAN) tablet 12.5 mg, 12.5 mg, Oral, Q6H PRN **OR** ondansetron (ZOFRAN) injection 4 mg, 4 mg, Intravenous, Q6H PRN  acetaminophen (TYLENOL) tablet 650 mg, 650 mg, Oral, Q6H PRN **OR** acetaminophen (TYLENOL) suppository 650 mg, 650 mg, Rectal, Q6H PRN  senna (SENOKOT) tablet 17.2 mg, 2 tablet, Oral, Nightly    PHYSICAL EXAM:    VITALS:  /68   Pulse 88   Temp 97.9 °F (36.6 °C)   Resp 20   Ht 5' 5\" (1.651 m)   Wt 189 lb 4.8 oz (85.9 kg)   SpO2 99%   BMI 31.50 kg/m²   INTAKE/OUTPUT:      Intake/Output Summary (Last 24 hours) at 2/17/2021 1138  Last data filed at 2/17/2021 9617  Gross per 24 hour   Intake 2408 ml   Output 900 ml   Net 1508 ml     CONSTITUTIONAL:  awake, alert, cooperative, no apparent distress, and appears stated age  ENT:  Normocephalic. NECK:  Supple, symmetrical, trachea midline, no adenopathy, thyroid symmetric, not enlarged and no tenderness, skin normal  LUNGS:  No increased work of breathing, good air exchange, clear to auscultation bilaterally, no crackles or wheezing  CARDIOVASCULAR:  Normal apical impulse, regular rate and rhythm, normal S1 and S2, no S3 or S4, and no murmur noted  ABDOMEN:  No scars, normal bowel sounds, soft, ostomy functional.  MUSCULOSKELETAL:  There is no redness, warmth, or swelling of the joints. Full range of motion noted. Motor strength is 5 out of 5 all extremities bilaterally. Tone is normal.  NEUROLOGIC:  Awake, alert, oriented to name, place and time. Cranial nerves II-XII are grossly intact. Motor is 5 out of 5 bilaterally. Cerebellar finger to nose, heel to shin intact. Sensory is intact.   Babinski down going, Romberg negative, and gait is normal.  SKIN:  no bruising or bleeding and no rashes    DATA:      PT/INR:  No results found for: PTINR  PTT:  No results found for: APTT  CMP:    Lab Results   Component Value Date     02/16/2021    K 4.3 02/16/2021     02/16/2021    CO2 19 02/16/2021 BUN 17 02/16/2021    PROT 5.9 02/02/2021     Magnesium:    Lab Results   Component Value Date    MG 1.9 02/12/2021     Phosphorus:  No components found for: PO4  Calcium:  No components found for: CA  CBC:    Lab Results   Component Value Date    WBC 21.5 02/16/2021    RBC 3.59 02/16/2021    HGB 10.2 02/16/2021    HCT 32.4 02/16/2021    MCV 90.3 02/16/2021    RDW 14.9 02/16/2021     02/16/2021     DIFF:    Lab Results   Component Value Date    MCV 90.3 02/16/2021    RDW 14.9 02/16/2021      LDH:  No results found for: LDH  Uric Acid:  No components found for: URIC    CBC with Differential:    Lab Results   Component Value Date    WBC 21.5 02/16/2021    RBC 3.59 02/16/2021    HGB 10.2 02/16/2021    HCT 32.4 02/16/2021     02/16/2021    MCV 90.3 02/16/2021    MCH 28.4 02/16/2021    MCHC 31.5 02/16/2021    RDW 14.9 02/16/2021    BANDSPCT 2 02/13/2021    METASPCT 1 02/16/2021    LYMPHOPCT 6.0 02/16/2021    MONOPCT 4.2 02/16/2021    MYELOPCT 1 02/16/2021    BASOPCT 0.5 02/16/2021    MONOSABS 0.2 02/16/2021    LYMPHSABS 1.3 02/16/2021    EOSABS 0.2 02/16/2021    BASOSABS 0.0 02/16/2021     CMP:    Lab Results   Component Value Date     02/16/2021    K 4.3 02/16/2021     02/16/2021    CO2 19 02/16/2021    BUN 17 02/16/2021    CREATININE 0.62 02/16/2021    GFRAA >60.0 02/16/2021    LABGLOM >60.0 02/16/2021    GLUCOSE 178 02/16/2021    PROT 5.9 02/02/2021    LABALBU 3.1 02/02/2021    CALCIUM 7.8 02/16/2021    BILITOT 0.6 02/02/2021    ALKPHOS 139 02/02/2021    AST 32 02/02/2021    ALT 38 02/02/2021       RADIOLOGY RESULTS:  Echocardiogram Complete 2d With Doppler With Color    Result Date: 2/3/2021 Transthoracic Echocardiography Report (TTE)  Demographics  Patient Name    Blake Lima Gender               Female  Patient Number  [id number]       Race                                                  Ethnicity  Visit Number    [id number]      Room Number          E701  Corporate ID                   Date of Study        02/03/2021  Accession       [id number]     Referring Physician  Number  Date of Birth   1950     Sonographer          [address]                                                      [address]y [address]  Age             69 year(s)     Interpreting         [facility])                                 Physician            Cardiology                                                      Mere Barnett MD Procedure Type of Study  TTE procedure:ECHO COMPLETE 2D W/DOP W/COLOR. Procedure Date Date: 02/03/2021 Start: 08:33 AM Study Location: Portable Technical Quality: Poor visualization due to poor acoustical window. Indications:LVF. Patient Status: Routine Height: 65 inches Weight: 153 pounds BSA: 1.77 m^2 BMI: 25.46 kg/m^2  Conclusions  Summary  Very limited and technically difficult study. LV Function appears to be preserved. will need repeat at later time. when more stable  Signature  ----------------------------------------------------------------  Electronically signed by Mere Barnett MD(Interpreting  physician) on 02/03/2021 12:07 PM  ----------------------------------------------------------------    Xr Abdomen (kub) (single Ap View)    Result Date: 2/4/2021  EXAMINATION: KUB CLINICAL HISTORY: No bowel movement for 2 weeks. COMPARISON :None FINDINGS: 3 view of the abdomen  submitted. There are multiply dilated and distended loops of large bowel. The transition point may be within the region of the distal descending colon and sigmoid. Bowel gas is seen within small bowel. 3 view of the abdomen shows bowel gas in both large and small bowel. DISTENDED DILATED LOOPS OF LARGE BOWEL WITH A TRANSITION POINT IN THE REGION OF THE DESCENDING COLON, SIGMOID. FINDINGS COULD SUGGEST THAT OF POSSIBLE HIGH-GRADE OBSTRUCTION. FURTHER EVALUATION IS WARRANTED. Us Pelvis Complete    Result Date: 2/11/2021  EXAMINATION:  US PELVIS COMPLETE CLINICAL HISTORY: Ascites. COMPARISONS:  NONE AVAILABLE TECHNIQUE:  Transabdominal ultrasound of the pelvis. FINDINGS:  The uterus is surgically absent. Both left and right ovaries are surgically absent. There is free fluid within the pelvis. FREE FLUID WITHIN THE PELVIS. EXAMINATION:  US DUP ABD PEL RETRO SCROT COMPLETE CLINICAL HISTORY: Abnormal CT scan. COMPARISONS:  NONE AVAILABLE TECHNIQUE:  Transabdominal ultrasound of the right upper quadrant with both duplex color ultrasound and spectral Doppler ultrasound with interrogation of the hepatic and portal venous system was performed. FINDINGS:  The visualized portion of the right lobe of the liver shows no focal parenchymal abnormalities. The hypoechoic area seen at the inferior medial aspect of the right lobe of liver on CT is not appreciated. Within the field-of-view there are findings of cholelithiasis. There is ascites present. The hepatic veins and portal veins are patent. Normal direction seen within the portal veins and hepatic vein. IMPRESSION:  1. ASCITES IS PRESENT WITHIN THE FIELD-OF-VIEW. 2. CHOLELITHIASIS. 3. UNREMARKABLE SONOGRAPHIC EXAMINATION OF THE HEPATIC AND PORTAL VEIN.     Ct Abdomen Pelvis W Iv Contrast Additional Contrast? None    Result Date: 2/1/2021 EXAM:  CT ABDOMEN PELVIS W IV CONTRAST History: Abdominal distention. Abdominal pain. Technique: Multiple contiguous axial images were obtained of the abdomen and pelvis from an level of the lung bases through the ischial tuberosities with IV contrast. Multiplanar reformats were obtained. Delayed images were obtained. Comparison: None available Findings: Small right pleural effusion. Bibasilar subsegmental atelectasis. Subtle nodularity of the liver. A 1 cm hypodense lesion within the right lobe of the liver is seen on axial series 2 image 29 and an ill-defined area of hypodensity within the inferior right lobe of the liver measuring approximately 2 cm is seen on axial  series 2 image 38. The gallbladder is physiologically distended and contains multiple small densities within the dependent portion. No gallbladder wall thickening is identified. The stomach, spleen, pancreas, and adrenal glands appear within normal limits. Small hiatal hernia. There is thickening and nodularity of the omentum. There is a large amount of ascites demonstrating simple fluid density. The kidneys enhance uniformly. There is mild right-sided hydronephrosis however no radiopaque or urinary tract calculi identified. No left-sided urinary tract calculi or hydronephrosis. Urinary bladder is suboptimally distended. There is mild perivesicular inflammation. The uterus is surgically absent Abdominal aorta is nonaneurysmal  . No retroperitoneal or abdominal/pelvic lymphadenopathy. No small bowel obstruction. No overt colonic mass or pericolonic inflammation although the large amount of ascites obscures evaluation for inflammation. There is a large amount of stool within the rectosigmoid colon. The appendix is not definitively visualized. No pneumoperitoneum or portal venous gas. No acute or aggressive osseous abnormality. Degenerative changes of the spine. Thickening and nodularity of the omentum concerning for peritoneal metastasis or peritonitis. A large amount of ascites is present. Two ill-defined lesions within the right lobe of the liver measuring 1 cm and 2 cm respectively are nonspecific but most concerning for metastatic lesions. Mild perivesicular inflammation. Correlation with urinalysis recommended to exclude cystitis. Large amount stool within the rectosigmoid colon may represent constipation and/or fecal impaction. Subtle nodularity of the liver suggests cirrhosis. Mild right-sided hydronephrosis without radiopaque calculus. Debris within the gallbladder likely represents gallstones and/or gallbladder sludge. Small right pleural effusion. All CT scans at this facility use dose modulation, iterative reconstruction, and/or weight based dosing when appropriate to reduce radiation dose to as low as reasonably achievable. Us Abdomen Limited    Result Date: 2/3/2021  CLINICAL INDICATION:   Patient with new ascites and abdominal pain, concern for metastatic disease COMPARISON: CT dating February 1, 2021 DISCUSSION:     Transverse and longitudinal images of the right upper quadrant of the abdomen were obtained with color Doppler interrogation of the hepatic vessels. The liver is normal  in echogenicity and no evidence of focal masses seen in the right liver lobe. The hepatic vein, and portal veins are patent. There is normal direction of blood flow in the portal veins towards the liver. The main portal vein, right portal vein, left portal vein, and hepatic vein are patent with normal direction. The common hepatic artery is patent with velocity measuring 72 cm/s. The gallbladder is seen with echogenic gallstones. The gallbladder wall measures 2.4 mm. Small bowel follow-through. HISTORY: Postcolonoscopy. COMPARISON: CT abdomen pelvis, February 1, 2021. FINDINGS:  image shows a nasogastric tube in stomach. Marked diffuse dilatation of colon cecum sigmoid colon. 0 minute film shows administration of oral contrast medium via nasogastric tube with contrast medium filling in stomach. 30 minute image shows contrast medium within attenuated distal stomach. Contrast medium visualized filling the duodenum with loop of small bowel coursing into right lower quadrant. Contrast medium also fills normal caliber jejunal loops and epigastric area residual contrast medium from attempted barium enema identified sigmoid colon. 60 minute image shows contrast medium pooling within jejunal loops in the right lower quadrant, and contrast medium to a lesser degree filling jejunal loops found in the left perigastric region. 90 minute image shows mild progression of contrast medium into proximal and mid jejunal loops. Several loops shows little progression of contrast medium passed level of mid jejunum. 4 hour image shows contrast medium with bowel loops in left lower quadrant. However, contrast media now visualized overlying region of the ileocecal valve and portion of ascending colon. 6 hour image shows additional contrast medium entering ascending colon and now entering proximal transverse colon, splenic flexure, and proximal descending colon. There is marked evaluate attenuation of contrast medium within the small bowel. At 8.5 hours, there is contrast medium continues to empty from the small bowel, and now is layering, in the dilated ascending colon. Dilatation of the cecum, measuring approximately 13.7 cm is identified. Residual contrast medium still fills the rectum. Final imaging of 21.5 hours shows near complete evacuation of contrast medium from the small bowel, January dilatation seen to the transverse diameter 17.2 cm, and contrast medium filling the ascending colon. There is still markedly pronounced more distal colon. Dilated small bowel loops, with persistent delay at level of proximal jejunum. Contrast medium visualized ileocecal valve at 4 hours. Following remains air-filled and dilated at that time. Findings suggest stenosis/partial obstruction at level of distal colon and at level of mid jejunum. Fl Barium Enema    Result Date: 2/8/2021  COMPARISON: February 4, 2021 Fluoroscopic time. 1.6 minutes 17 images were obtained. HISTORY:    Abdominal pain, possible obstruction PATIENT NAME: JOEY SALDANA: TECHNIQUE: FL BARIUM ENEMA FINDINGS: Air is seen within bowel. An NG tube is seen in place with tip in the area of the stomach. Dilated loops of bowel are again seen. The most dilated loop measures up to 11.7 cm. Barium was introduced into the rectum. When the barium went into the distal sigmoid colon, patient was  unable to tolerate the barium and asked for the procedure to be stop. The barium was drained from the colon. Incomplete barium enema. Patient unable to tolerate procedure. There is diffuse dilation of the bowel. ASSESSMENT AND PLAN:  Pathology results are completed. Multiple areas of adenocarcinoma from peritoneal resected specimen. Looks to be GI primary. 10-20% chance of transient response to chemotherapy. Hospice probably appropriate. Otherwise ok to dc to SNF and fu in 1 week.       Electronically signed by Brent Teixeira DO on 2/17/21 at 11:38 AM EST

## 2021-02-17 NOTE — PROGRESS NOTES
Pt nauseated this evening  Medicated per orders  Scant amount of yellow emesis noted   Vital signs WNL   Tube feed paused  Pt states nausea eased up Dr Mindi Ford notified  New rate ordered for now

## 2021-02-18 LAB
GLUCOSE BLD-MCNC: 66 MG/DL (ref 60–115)
GLUCOSE BLD-MCNC: 77 MG/DL (ref 60–115)
GLUCOSE BLD-MCNC: 88 MG/DL (ref 60–115)
GLUCOSE BLD-MCNC: 88 MG/DL (ref 60–115)
GLUCOSE BLD-MCNC: 94 MG/DL (ref 60–115)
PERFORMED ON: NORMAL

## 2021-02-18 PROCEDURE — 6360000002 HC RX W HCPCS: Performed by: FAMILY MEDICINE

## 2021-02-18 PROCEDURE — 99213 OFFICE O/P EST LOW 20 MIN: CPT

## 2021-02-18 PROCEDURE — 6360000002 HC RX W HCPCS: Performed by: COLON & RECTAL SURGERY

## 2021-02-18 RX ADMIN — ONDANSETRON 4 MG: 2 INJECTION INTRAMUSCULAR; INTRAVENOUS at 17:17

## 2021-02-18 RX ADMIN — ONDANSETRON 4 MG: 2 INJECTION INTRAMUSCULAR; INTRAVENOUS at 02:10

## 2021-02-18 RX ADMIN — HYDROMORPHONE HYDROCHLORIDE 0.5 MG: 1 INJECTION, SOLUTION INTRAMUSCULAR; INTRAVENOUS; SUBCUTANEOUS at 02:10

## 2021-02-18 RX ADMIN — LORAZEPAM 0.5 MG: 2 INJECTION INTRAMUSCULAR at 18:16

## 2021-02-18 RX ADMIN — ONDANSETRON 4 MG: 2 INJECTION INTRAMUSCULAR; INTRAVENOUS at 06:20

## 2021-02-18 RX ADMIN — HYDROMORPHONE HYDROCHLORIDE 0.5 MG: 1 INJECTION, SOLUTION INTRAMUSCULAR; INTRAVENOUS; SUBCUTANEOUS at 18:19

## 2021-02-18 RX ADMIN — ENOXAPARIN SODIUM 40 MG: 40 INJECTION SUBCUTANEOUS at 08:39

## 2021-02-18 RX ADMIN — METOCLOPRAMIDE HYDROCHLORIDE 5 MG: 5 INJECTION INTRAMUSCULAR; INTRAVENOUS at 10:08

## 2021-02-18 RX ADMIN — HYDROMORPHONE HYDROCHLORIDE 0.5 MG: 1 INJECTION, SOLUTION INTRAMUSCULAR; INTRAVENOUS; SUBCUTANEOUS at 06:20

## 2021-02-18 ASSESSMENT — PAIN SCALES - GENERAL: PAINLEVEL_OUTOF10: 5

## 2021-02-18 NOTE — PROGRESS NOTES
Pt continues to be medicated for pain, anxiety, and nausea  Repositioned frequently  Tolerating sips of water   Ostomy putting out semi thick brown stool

## 2021-02-18 NOTE — PROGRESS NOTES
[address] [facility] Occupational Therapy Department  Change in Status Communication Form      To the referring physician:    Pt. has requested to be removed from OT program d/t plan to d/c home with hospice. Pt. no longer wishes to continue with therapy at an acute level. D/c from OT POC at this time. We thank you for your referral.     Jeanne Weaverr OT Department.      Electronically signed by DONNY Andrew on 2/18/21 at 8:51 AM EST

## 2021-02-18 NOTE — PROGRESS NOTES
Wound Ostomy Continence Nurse  Ostomy Referral Follow-up Progress Note      NAME:  Lluvia Wisdom  MEDICAL RECORD NUMBER:  [id number]  AGE: 69 y.o. GENDER:  female  :  1950  TODAY'S DATE:  2021    Subjective:    Lluvia Wisdom is a 69 y.o. female referred by:   [x] Physician  [] Nursing  [] Other:     ileostomy and New    Objective    /64   Pulse 95   Temp 98.4 °F (36.9 °C) (Oral)   Resp 16   Ht 5' 5\" (1.651 m)   Wt 189 lb 4.8 oz (85.9 kg)   SpO2 97%   BMI 31.50 kg/m²     Emmanuel Risk Score Emmanuel Scale Score: 18    Assessment   Surgeon - Dr. James Grossman       Ileostomy Ileostomy RLQ (Active)   Stomal Appliance 1 piece;Clean;Dry; Intact 21 1215   Flange Size (inches) 1.25 Inches 21 1215   Stoma  Assessment Moist;Red;Protrudes 21 1012   Mucocutaneous Junction Intact 21 1215   Peristomal Assessment Clean; Intact 21 1215   Treatment Bag change;Site care 21 1140   Stool Color Brown 21 1012   Stool Appearance Soft 21 1012   Stool Amount Small 21 1012   Output (mL) 50 ml 21 0425   Number of days: 9     No change in stoma appearance or ileostomy function. Plan is for patient to discharge home with hospice. Patient has 2 weeks of supplies to take home with her and samples requested to be delivered to patient's home. Patient has supplies list as well. Patient does not have any questions at this time, only states \"I want to get this over with, I want to go home. \" Patient repositioned in bed at this time.       Intake/Output Summary (Last 24 hours) at 2021 1253  Last data filed at 2021 0425  Gross per 24 hour   Intake 100 ml   Output 730 ml   Net -630 ml       Plan:   Plan for Ostomy Care: No other needs      Ostomy Plan of Care  [] Supplies/Instructions left in room  [] Patient using home supplies  [x] Brand/supplies at bedside - [employer] Current Diet: DIET CLEAR LIQUID;  Diet Tube Feed Continuous/Cyclic w/ Diet  Dietician consult:  Yes    Discharge Plan:  Placement for patient upon discharge: home with support    Outpatient visit plan No  Supplies given Yes   Samples requested Yes    Referrals:  []   [] [facility]  [] Supplies  [] Other      Patient/Caregiver Teaching:  Written Instructions given to patient/family: N/a  Teaching provided:  [] Reviewed GI and A&P        [] Supplies  [] Pouch emptying      [] Manipulate closure  [] Routine Care         [] Comment  [] Pouch maintenance           Level of patient/caregiver understanding able to:  [] Indicates understanding       [] Needs reinforcement  [] Unsuccessful      [] Verbal Understanding  [] Demonstrated understanding       [] No evidence of learning  [] Refused teaching         [x] N/A    Electronically signed by YURIY JacobsonN, RN, CWOCN on 2/18/2021 at 1:00 PM

## 2021-02-18 NOTE — DISCHARGE SUMMARY
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 Neli Garcia  Dysphagia on tube feeding  BALA  Hyponatremia  Advance care planning    Chief Complaint   Patient presents with    Fatigue     sent by dr Emerita Thomas for dehydration work up , patient has been vomiting and not keeping anything down and is very fatigued     Hospital Course: This is a 67-year-old female who unfortunately was found to have malignant ascites with adenocarcinoma and the source was found to be related to cholangiocarcinoma versus pancreatic cancer with liver mets and omental mets. She had high-grade colon obstruction and underwent exploratory laparotomy with subtotal colectomy and ileostomy by Dr. Neli Garcia. She required tube feeding due to dysphagia and postoperatively due to her surgery. She also developed BALA and hyponatremia. Patient was followed by general surgery and oncology during this admission addition to palliative care. After multiple discussion with patient , son and daughter and the patient herself, family elected to proceed with comfort measures only on hospice. She was switched to DNR CC and discharged home with hospice. Exam on discharge:   /64   Pulse 95   Temp 98.4 °F (36.9 °C) (Oral)   Resp 16   Ht 5' 5\" (1.651 m)   Wt 189 lb 4.8 oz (85.9 kg)   SpO2 97%   BMI 31.50 kg/m²   General appearance: Very weak and sleepy. Answer questions.    Mental Status: oriented to person, place and time  Lungs: clear to auscultation bilaterally, normal effort Heart: regular rate and rhythm, no murmur  Abdomen: soft, nondistended, bowel sounds present, no masses.  ileostomy   Extremities: 3+ pitting bilateral lower extremity edema   skin: no gross lesions, rashes    Patient was seen by the following consultants while admitted to [facility]:   Consults:  [place] TO ONCOLOGY  IP CONSULT TO GI  IP CONSULT TO GENERAL SURGERY  IP CONSULT TO PALLIATIVE CARE  IP CONSULT TO PULMONOLOGY  IP CONSULT TO DIETITIAN  IP CONSULT TO HOSPICE    Significant Diagnostic Studies:    Refer to chart     Please refer to chart if no studies are shown here    Ct Abdomen Pelvis W Iv Contrast Additional Contrast? None    Result Date: 2/1/2021 Thickening and nodularity of the omentum concerning for peritoneal metastasis or peritonitis. A large amount of ascites is present. Two ill-defined lesions within the right lobe of the liver measuring 1 cm and 2 cm respectively are nonspecific but most concerning for metastatic lesions. Mild perivesicular inflammation. Correlation with urinalysis recommended to exclude cystitis. Large amount stool within the rectosigmoid colon may represent constipation and/or fecal impaction. Subtle nodularity of the liver suggests cirrhosis. Mild right-sided hydronephrosis without radiopaque calculus. Debris within the gallbladder likely represents gallstones and/or gallbladder sludge. Small right pleural effusion. All CT scans at this facility use dose modulation, iterative reconstruction, and/or weight based dosing when appropriate to reduce radiation dose to as low as reasonably achievable. Us Abdomen Limited    Result Date: 2/3/2021  CLINICAL INDICATION:   Patient with new ascites and abdominal pain, concern for metastatic disease COMPARISON: CT dating February 1, 2021 DISCUSSION:     Transverse and longitudinal images of the right upper quadrant of the abdomen were obtained with color Doppler interrogation of the hepatic vessels. The liver is normal  in echogenicity and no evidence of focal masses seen in the right liver lobe. The hepatic vein, and portal veins are patent. There is normal direction of blood flow in the portal veins towards the liver. The main portal vein, right portal vein, left portal vein, and hepatic vein are patent with normal direction. The common hepatic artery is patent with velocity measuring 72 cm/s. The gallbladder is seen with echogenic gallstones. The gallbladder wall measures 2.4 mm. The portal veins, hepatic vein, and common hepatic artery are patent with normal direction of blood flow. No lesions are seen in the right liver lobe, though ultrasound is limited, and if there is clinical suspicion, MRI of the liver can be obtained. Us Guided Paracentesis    1. Status post technically successful ultrasound-guided paracentesis. Patricia Amaral is a Female of 69 years age, referred for Ultrasound Guided Paracentesis. PROCEDURE: Survey of the abdomen showed large amount of ascites fluid. After obtaining informed consent, the patient was positioned supine on the sonography table. Using ultrasound, the skin over the left hemiabdomen was locally anesthetized with 1% lidocaine. Following that, a [employer] needle was advanced into the fluid pocket using ultrasound visualization. 4440cc, of clear yellow fluid were aspirated and sent for cytology, and pathology. The needle was removed, and hemostasis was obtained with pressure. A Band-Aid was placed. Post procedure images did not demonstrate hemorrhage at the target site. The patient tolerated the procedure well. The patient left the department in good condition. A radiology nurse was in presence monitoring vital signs, assisting throughout the procedure. Discharge Medications:        Christiano Tobias   Home Medication Instructions [id number]    Printed on:02/18/21 1120   Medication Information                      Ascorbic Acid (VITAMIN C) 250 MG tablet  Take 500 mg by mouth daily             ondansetron (ZOFRAN) 4 MG tablet  Take 1 tablet by mouth every 12 hours as needed for Nausea or Vomiting                 Disposition:   Home with hospice    Condition at discharge: Fair    Activity: activity as tolerated Total time taken for discharging this patient: 40 minutes. Greater than 70% of time was spent focused exclusively on this patient. Time was taken to review chart, discuss plans with consultants, reconciling medications, discussing plan answering questions with patient.      Yovana Moura  2/18/2021, 11:20 AM  ----------------------------------------------------------------------------------------------------------------------    [place]

## 2021-02-18 NOTE — PROGRESS NOTES
Hospice Referral completed, family requests to go home with Hospice. Consents signed. Betty Rebollar RN and [facility] CC updated. Transport setup for 6:30 PM via [address] [facility]. Encouraged to medicate prior to pickup.

## 2021-02-18 NOTE — PROGRESS NOTES
Physical Therapy Missed Treatment   Facility/Department: [facility] MED SURG E041/H239-89    NAME: Brisa Mojica  Patient Status:   : 1950 (69 y.o.)  MRN: [id number]  Account: [de-identified]  Gender: female        [x] Patient Declines PT Treatment            [] Patient Unavailable:     Pt declined PT at this time. Spouse present and asked if she was sure and she said no. Offered to come back later and pt replied \"no\" and shook head no. Will attempt PT Treatment again at earliest convenience.         Electronically signed by Porter Ortiz PTA on 21 at 11:24 AM EST

## 2022-01-10 ENCOUNTER — TELEPHONE (OUTPATIENT)
Dept: FAMILY MEDICINE CLINIC | Age: 72
End: 2022-01-10

## 2022-01-20 NOTE — PROGRESS NOTES
Have offered antiemetic, analgesic and anti anxiety meds several times so far this shift but pt has declined stating she does not need them. TF showed residual of 120cc. TF continued. Repositioned for comfort. Spoke at length with daughter Zainab Stevenson earlier in the shift via telephone and gave update on patient's condition but informed test results and future plans for treatment need to be discussed with the physician. She verbalized understanding. 4 = No assist / stand by assistance

## (undated) DEVICE — TTL1LYR 16FR10ML 100%SIL TMPST TR: Brand: MEDLINE

## (undated) DEVICE — Device

## (undated) DEVICE — SUTURE PROL SZ 0 L30IN NONABSORBABLE BLU L36MM CT-1 1/2 CIR 8424H

## (undated) DEVICE — YANKAUER,POOLE TIP,STERILE,50/CS: Brand: MEDLINE

## (undated) DEVICE — RESERVOIR,SUCTION,100CC,SILICONE: Brand: MEDLINE

## (undated) DEVICE — ELECTRODE PT RET AD L9FT HI MOIST COND ADH HYDRGEL CORDED

## (undated) DEVICE — MARKER SURG SKIN GENTIAN VLT REG TIP W/ 6IN RUL

## (undated) DEVICE — GOWN,AURORA,NONREINFORCED,LARGE: Brand: MEDLINE

## (undated) DEVICE — PACK,LAPAROTOMY,NO GOWNS: Brand: MEDLINE

## (undated) DEVICE — NEPTUNE E-SEP SMOKE EVACUATION PENCIL, COATED, 70MM BLADE, PUSH BUTTON SWITCH: Brand: NEPTUNE E-SEP

## (undated) DEVICE — ADAPTER FLSH PMP FLD MGMT GI IRRIG OFP 2 DISPOSABLE

## (undated) DEVICE — Z DISCONTINUED USE 2716238 PER MEDLINE STAPLER INT L40MM DIA4.7MM GRN CRV HD B FRM TECHNOLOGY DISP

## (undated) DEVICE — Z DUPLICATE USE 2716240 STAPLER INT CUT LN L40MM STPL L51MM GRN CRV HD B FRM

## (undated) DEVICE — DRAPE THER FLUID WARMING 66X44 IN FLAT SLUSH DBL DISC ORS

## (undated) DEVICE — GLOVE ORANGE PI 7 1/2   MSG9075

## (undated) DEVICE — Device: Brand: ENDO SMARTCAP

## (undated) DEVICE — GOWN,SIRUS,POLYRNF,BRTHSLV,XLN/XL,20/CS: Brand: MEDLINE

## (undated) DEVICE — GLOVE ORTHO 8   MSG9480

## (undated) DEVICE — SUTURE VCRL SZ 3-0 L54IN ABSRB VLT LIGAPAK REEL NDL J205G

## (undated) DEVICE — TUBE SET 96 MM 64 MM H2O PERISTALTIC STD AUX CHANNEL

## (undated) DEVICE — DRAPE EQUIP TRNSPRT CONTAINMENT FOR BK TAB

## (undated) DEVICE — YANKAUER,BULB TIP,W/O VENT,RIGID,STERILE: Brand: MEDLINE

## (undated) DEVICE — TUBING, SUCTION, 1/4" X 10', STRAIGHT: Brand: MEDLINE

## (undated) DEVICE — Z DISCONTINUED USE 2271023 SYRINGE IRRIGATION TOOM 70 CC CATH LUER ADPT TIP PLAS STRL

## (undated) DEVICE — SINGLE PORT MANIFOLD: Brand: NEPTUNE 2

## (undated) DEVICE — SPONGE,LAP,18"X18",DLX,XR,ST,5/PK,40/PK: Brand: MEDLINE

## (undated) DEVICE — SUTURE PERMAHAND SZ 0 L30IN NONABSORBABLE BLK SILK BRAID A306H

## (undated) DEVICE — SUTURE ABSORBABLE BRAIDED 0 CT-1 8X27 IN UD VICRYL JJ41G

## (undated) DEVICE — SUTURE VCRL SZ 0 L36IN ABSRB UD L36MM CT-1 1/2 CIR J946H

## (undated) DEVICE — APPLICATOR MEDICATED 26 CC SOLUTION HI LT ORNG CHLORAPREP

## (undated) DEVICE — COUNTER NDL 40 COUNT HLD 70 FOAM BLK ADH W/ MAG

## (undated) DEVICE — SUTURE PERMA-HAND SZ 3-0 L18IN NONABSORBABLE BLK SH-1 L22MM C003D

## (undated) DEVICE — LABEL MED MINI W/ MARKER

## (undated) DEVICE — DRAIN SURG 19FR 100% SIL RADPQ RND CHN FULL FLUT

## (undated) DEVICE — ENDO CARRY-ON PROCEDURE KIT: Brand: ENDO CARRY-ON PROCEDURE KIT

## (undated) DEVICE — APPLIER LIG CLP M L11IN TI STR RNG HNDL FOR 20 CLP DISP

## (undated) DEVICE — TOWEL,OR,DSP,ST,BLUE,STD,4/PK,20PK/CS: Brand: MEDLINE

## (undated) DEVICE — SYRINGE IRRIG 60ML SFT PLIABLE BLB EZ TO GRP 1 HND USE W/

## (undated) DEVICE — GAUZE,SPONGE,4"X4",16PLY,XRAY,STRL,LF: Brand: MEDLINE

## (undated) DEVICE — SEALER ENDOSCP NANO COAT OPN DIV CRV L JAW LIGASURE IMPACT

## (undated) DEVICE — 450 ML BOTTLE OF 0.05% CHLORHEXIDINE GLUCONATE IN 99.95% STERILE WATER FOR IRRIGATION, USP AND APPLICATOR.: Brand: IRRISEPT ANTIMICROBIAL WOUND LAVAGE

## (undated) DEVICE — GLOVE ORANGE PI 8 1/2   MSG9085

## (undated) DEVICE — BRUSH ENDO CLN L90.5IN SHTH DIA1.7MM BRIST DIA5-7MM 2-6MM